# Patient Record
Sex: MALE | Race: AMERICAN INDIAN OR ALASKA NATIVE | NOT HISPANIC OR LATINO | Employment: UNEMPLOYED | URBAN - NONMETROPOLITAN AREA
[De-identification: names, ages, dates, MRNs, and addresses within clinical notes are randomized per-mention and may not be internally consistent; named-entity substitution may affect disease eponyms.]

---

## 2017-10-23 ENCOUNTER — NON-PROVIDER VISIT (OUTPATIENT)
Dept: URGENT CARE | Facility: PHYSICIAN GROUP | Age: 57
End: 2017-10-23

## 2017-10-23 DIAGNOSIS — Z02.1 PRE-EMPLOYMENT DRUG SCREENING: ICD-10-CM

## 2017-10-23 LAB
AMP AMPHETAMINE: ABNORMAL
COC COCAINE: ABNORMAL
INT CON NEG: NEGATIVE
INT CON POS: POSITIVE
MET METHAMPHETAMINES: ABNORMAL
OPI OPIATES: ABNORMAL
PCP PHENCYCLIDINE: ABNORMAL
POC DRUG COMMENT 753798-OCCUPATIONAL HEALTH: POSITIVE
THC: POSITIVE

## 2017-10-23 PROCEDURE — 80305 DRUG TEST PRSMV DIR OPT OBS: CPT | Performed by: PHYSICIAN ASSISTANT

## 2020-03-27 ENCOUNTER — TELEPHONE (OUTPATIENT)
Dept: CARDIOLOGY | Facility: MEDICAL CENTER | Age: 60
End: 2020-03-27

## 2020-03-27 NOTE — TELEPHONE ENCOUNTER
Called and left message with the [facility] clinic pt has an EF of 25 with possible apical thrombus on echo today in [place]    It is my pleasure to participate in the care of Mr. Horta.  Please do not hesitate to contact me with questions or concerns.    Anmol June MD PhD [facility]  Cardiologist [facility]    Please note that this dictation was created using voice recognition software. There may be errors I did not discover before finalizing the note.

## 2020-03-27 NOTE — TELEPHONE ENCOUNTER
CW    Dr. Anmol Mccray MD called back. Please have CW call Dr Mccray back on their cell phone # [phone].    Thank you,  Audrey LUKE

## 2020-03-27 NOTE — TELEPHONE ENCOUNTER
Called Dr Mccray pt has resistant HTN so will increase his meds add CHF meds and get him in with urgent cardiology eval for ischemic eval    Would be imprtant to rule out substance abuse as well    also empiric anticoagulation until he can get a contrast echo

## 2020-08-25 ENCOUNTER — OFFICE VISIT (OUTPATIENT)
Dept: CARDIOLOGY | Facility: MEDICAL CENTER | Age: 60
End: 2020-08-25
Payer: MEDICAID

## 2020-08-25 VITALS
DIASTOLIC BLOOD PRESSURE: 82 MMHG | SYSTOLIC BLOOD PRESSURE: 148 MMHG | OXYGEN SATURATION: 96 % | HEIGHT: 71 IN | BODY MASS INDEX: 28.14 KG/M2 | HEART RATE: 66 BPM | WEIGHT: 201 LBS

## 2020-08-25 DIAGNOSIS — I10 ESSENTIAL HYPERTENSION: ICD-10-CM

## 2020-08-25 LAB — EKG IMPRESSION: NORMAL

## 2020-08-25 PROCEDURE — 93000 ELECTROCARDIOGRAM COMPLETE: CPT | Performed by: INTERNAL MEDICINE

## 2020-08-25 PROCEDURE — 99204 OFFICE O/P NEW MOD 45 MIN: CPT | Performed by: INTERNAL MEDICINE

## 2020-08-25 NOTE — NON-PROVIDER
Patient unable to verify Medication list. I will attempt to contact [facility] pharmacy for confirmation. Educated patient on the importance of have a list or picture of bottle for his appointments. Patient agrees.    Attempted to get Medication list from [facility]. [facility] states that it is a Hipaa Violation to provide the information on a patient that is currently on our clinic being seen.    Will attempt to contact PCP next.  [facility] office closed open 9-3. Will have patient report back when he gets home with List of medications(Call our office [phone]) to update his chart.

## 2020-08-25 NOTE — PROGRESS NOTES
Arrhythmia/Cardiac Clinic Note (New patient)     DOS: 8/25/2020    Referring physician: Self-referred    Chief complaint/Reason for consult: High BP    HPI: 60-year-old man with history of hypertension, referred by himself for elevation of blood pressure.  He was told he should see a cardiologist when he had an abnormal echocardiogram in the spring.  He has medication noncompliance.  He did not bring his medication list in today.  He says he takes a medication for cholesterol, some medication for blood pressure, he thinks he might be on a blood thinner but does not know the name of it.  He denies shortness of breath on exertion and denies chest pain on exertion.  He does have a little bit of lower extremity edema.  No palpitations, no syncope, no presyncope.  Echocardiogram in March 2020 did show ejection fraction 25%, with possible LV thrombus    ROS (+ highlighted in bold):  Constitutional: Fevers/chills/fatigue/weightloss  HEENT: Blurry vision/eye pain/sore throat/hearing loss  Respiratory: Shortness of breath/cough  Cardiovascular: Chest pain/palpitations/edema/orthopnea/syncope  GI: Nausea/vomitting/diarrhea  MSK: Arthralgias/myagias/muscle weakness  Skin: Rash/sores  Neurological: Numbness/tremors/vertigo  Endocrine: Excessive thirst/polyuria/cold intolerance/heat intolerance  Psych: Depression/anxiety    No past medical history on file.   Hypertension  Hyperlipidemia  Heart failure, systolic, chronic    No past surgical history on file.    Social History     Socioeconomic History   • Marital status: Single     Spouse name: Not on file   • Number of children: Not on file   • Years of education: Not on file   • Highest education level: Not on file   Occupational History   • Not on file   Social Needs   • Financial resource strain: Not on file   • Food insecurity     Worry: Not on file     Inability: Not on file   • Transportation needs     Medical: Not on file     Non-medical: Not on file   Tobacco Use   •  "Smoking status: Not on file   Substance and Sexual Activity   • Alcohol use: Not on file   • Drug use: Not on file   • Sexual activity: Not on file   Lifestyle   • Physical activity     Days per week: Not on file     Minutes per session: Not on file   • Stress: Not on file   Relationships   • Social connections     Talks on phone: Not on file     Gets together: Not on file     Attends Hinduism service: Not on file     Active member of club or organization: Not on file     Attends meetings of clubs or organizations: Not on file     Relationship status: Not on file   • Intimate partner violence     Fear of current or ex partner: Not on file     Emotionally abused: Not on file     Physically abused: Not on file     Forced sexual activity: Not on file   Other Topics Concern   • Not on file   Social History Narrative   • Not on file       No family history on file.   No family history of premature coronary disease    No Known Allergies    No current outpatient medications on file.     No current facility-administered medications for this visit.    Medication list is unknown    Physical Exam:  Vitals:    08/25/20 1547   BP: 148/82   BP Location: Left arm   Patient Position: Sitting   BP Cuff Size: Adult   Pulse: 66   SpO2: 96%   Weight: 91.2 kg (201 lb)   Height: 1.791 m (5' 10.5\")     General appearance: NAD, conversant   Eyes: anicteric sclerae, moist conjunctivae; no lid-lag; PERRLA  HENT: Atraumatic; oropharynx clear with moist mucous membranes and no mucosal ulcerations; normal hard and soft palate  Neck: Trachea midline; FROM, supple, no thyromegaly or lymphadenopathy  Lungs: CTA, with normal respiratory effort and no intercostal retractions  CV: RRR, no MRGs, no JVD   Abdomen: Soft, non-tender; no masses or HSM  Extremities: No peripheral edema or extremity lymphadenopathy  Skin: Normal temperature, turgor and texture; no rash, ulcers or subcutaneous nodules  Psych: Appropriate affect, alert and oriented to " person, place and time    Data:  Prior echo/stress results reviewed: Ejection fraction 25%, possible LV thrombus    EKG interpreted by me: Sinus rhythm, IVCD    Impression/Plan:  1. Essential hypertension  EKG     1.  Hypertension  2.  Hyperlipidemia  3.  Chronic systolic heart failure  4.  Possible LV thrombus    -He does not know his medication so at this time it is difficult to tell what he is on and how to treat further  -His blood pressure is at goal but I do not know what he is taking so I can adjust his medication accordingly  -He said he does take a statin medication  -He denies having any sort of heart cath work-up.  -If he is not on anticoagulation, I would prefer to send him for a coronary angiogram given depressed LV function  -If he is on anticoagulation, coronary CT angiogram may be sufficient  -He should probably get a repeat echocardiogram with contrast as well    Overall, there is a lot we do not know about his care and the medications that he is taking, I will set him up to see one of our incoming general cardiologists to establish care and work out with medications he is on and further work-up of systolic heart failure  .    Jesus Lackey MD  Cardiac Electrophysiology

## 2020-09-17 ENCOUNTER — OFFICE VISIT (OUTPATIENT)
Dept: CARDIOLOGY | Facility: MEDICAL CENTER | Age: 60
End: 2020-09-17
Payer: MEDICAID

## 2020-09-17 VITALS
DIASTOLIC BLOOD PRESSURE: 88 MMHG | SYSTOLIC BLOOD PRESSURE: 152 MMHG | BODY MASS INDEX: 27.09 KG/M2 | RESPIRATION RATE: 12 BRPM | WEIGHT: 193.5 LBS | HEIGHT: 71 IN | OXYGEN SATURATION: 98 % | HEART RATE: 60 BPM

## 2020-09-17 DIAGNOSIS — E11.9 TYPE 2 DIABETES MELLITUS WITHOUT COMPLICATION, WITHOUT LONG-TERM CURRENT USE OF INSULIN (HCC): ICD-10-CM

## 2020-09-17 DIAGNOSIS — I24.0 LV (LEFT VENTRICULAR) MURAL THROMBUS WITHOUT MI (HCC): ICD-10-CM

## 2020-09-17 DIAGNOSIS — I1A.0 RESISTANT HYPERTENSION: ICD-10-CM

## 2020-09-17 DIAGNOSIS — I10 ESSENTIAL HYPERTENSION, BENIGN: ICD-10-CM

## 2020-09-17 DIAGNOSIS — I50.22 CHRONIC SYSTOLIC HEART FAILURE (HCC): ICD-10-CM

## 2020-09-17 DIAGNOSIS — Z79.01 ANTICOAGULANT LONG-TERM USE: ICD-10-CM

## 2020-09-17 PROCEDURE — 99215 OFFICE O/P EST HI 40 MIN: CPT | Performed by: INTERNAL MEDICINE

## 2020-09-17 RX ORDER — SPIRONOLACTONE 25 MG/1
25 TABLET ORAL DAILY
COMMUNITY
End: 2021-04-15

## 2020-09-17 RX ORDER — CARVEDILOL 6.25 MG/1
6.25 TABLET ORAL 2 TIMES DAILY WITH MEALS
COMMUNITY
End: 2021-04-15

## 2020-09-17 RX ORDER — WARFARIN SODIUM 5 MG/1
5 TABLET ORAL DAILY
COMMUNITY
End: 2021-04-15

## 2020-09-17 RX ORDER — LOSARTAN POTASSIUM AND HYDROCHLOROTHIAZIDE 25; 100 MG/1; MG/1
1 TABLET ORAL DAILY
COMMUNITY
End: 2021-04-15

## 2020-09-17 RX ORDER — ATORVASTATIN CALCIUM 20 MG/1
20 TABLET, FILM COATED ORAL NIGHTLY
COMMUNITY
End: 2021-04-15

## 2020-09-17 RX ORDER — AMLODIPINE BESYLATE 10 MG/1
10 TABLET ORAL DAILY
Qty: 90 TAB | Refills: 3 | Status: SHIPPED | OUTPATIENT
Start: 2020-09-17 | End: 2021-04-15

## 2020-09-17 SDOH — HEALTH STABILITY: MENTAL HEALTH: HOW OFTEN DO YOU HAVE 6 OR MORE DRINKS ON ONE OCCASION?: WEEKLY

## 2020-09-17 ASSESSMENT — ENCOUNTER SYMPTOMS
NEAR-SYNCOPE: 0
WEAKNESS: 0
SLEEP DISTURBANCES DUE TO BREATHING: 0
FEVER: 0
DIARRHEA: 0
WHEEZING: 0
VOMITING: 0
SYNCOPE: 0
DIZZINESS: 0
BLURRED VISION: 0
LIGHT-HEADEDNESS: 0
NIGHT SWEATS: 0
COUGH: 0
CHILLS: 0
IRREGULAR HEARTBEAT: 0
CLAUDICATION: 0
DIAPHORESIS: 0
SORE THROAT: 0
DYSPNEA ON EXERTION: 0
DECREASED APPETITE: 0
SHORTNESS OF BREATH: 0
ODYNOPHAGIA: 0
BLOATING: 0
ABDOMINAL PAIN: 0
NAUSEA: 0
HEADACHES: 0

## 2020-09-17 NOTE — PROGRESS NOTES
Cardiology Follow-up Consultation Note    Date of note:    9/17/2020    Primary Care Provider: Dr. Mccray    Name:             Donte Horta     YOB: 1960  MRN:               [id number]    CC: Hypertension    HISTORY OF PRESENT ILLNESS  Mr. Donte Horta is a 60 y.o. male who returns to see us for follow-up of chronic systolic heart failure with estimated EF 25-30%, resistant hypertension, diabetes mellitus and LV thrombus on chronic anticoagulation medication.    Last clinic visit: 8/25/2020 with Jesus Lackey M.D.    Interim History:  Patient states that he is feeling better with more energy than prior.  Reports medication compliance for the most part however thinks that he is being overmedicated.  States that he will sometimes cut his medications in half if he feels good.  Currently he is taking warfarin for LV thrombus but is unsure if he is getting his INR checked.  He has a blood pressure cuff at home and reports average blood pressure 150s/80s.  Denies shortness of breath, orthopnea, PND, dyspnea on exertion, abdominal bloating or lower extremity edema.  Denies previous history or current episodes of chest pain or chest pressure.  Is unaware of suffering a previous heart attack.  Had appetite loss earlier this year while he was sick and lost 30 to 40 pounds but it has resolved since then.    Reports doing occasional marijuana with no history of methamphetamines or cocaine use.    Review of Systems   Constitution: Negative for chills, decreased appetite, diaphoresis, fever and night sweats.   HENT: Negative for congestion, odynophagia and sore throat.    Eyes: Negative for blurred vision.   Cardiovascular: Negative for chest pain, claudication, dyspnea on exertion, irregular heartbeat, near-syncope and syncope.   Respiratory: Negative for cough, shortness of breath, sleep disturbances due to breathing and wheezing.    Gastrointestinal: Negative for bloating, abdominal pain, diarrhea,  nausea and vomiting.   Neurological: Negative for dizziness, headaches, light-headedness and weakness.       Past Medical History:   Diagnosis Date   • Anticoagulation monitoring, special range    • Diabetes (HCC)    • Hypertension          History reviewed. No pertinent surgical history.      Current Outpatient Medications   Medication Sig Dispense Refill   • carvedilol (COREG) 6.25 MG Tab Take 6.25 mg by mouth 2 times a day, with meals.     • atorvastatin (LIPITOR) 20 MG Tab Take 40 mg by mouth every evening.     • spironolactone (ALDACTONE) 25 MG Tab Take 25 mg by mouth every day.     • metFORMIN (GLUCOPHAGE) 500 MG Tab Take 1,000 mg by mouth 2 times a day, with meals.     • losartan-hydrochlorothiazide (HYZAAR) 100-25 MG per tablet Take 1 Tab by mouth every day.     • warfarin (COUMADIN) 5 MG Tab Take 5 mg by mouth every day.     • amLODIPine (NORVASC) 10 MG Tab Take 1 Tab by mouth every day. 90 Tab 3     No current facility-administered medications for this visit.          No Known Allergies      History reviewed. No pertinent family history.      Social History     Socioeconomic History   • Marital status: Single     Spouse name: Not on file   • Number of children: Not on file   • Years of education: Not on file   • Highest education level: Not on file   Occupational History   • Not on file   Social Needs   • Financial resource strain: Not on file   • Food insecurity     Worry: Not on file     Inability: Not on file   • Transportation needs     Medical: Not on file     Non-medical: Not on file   Tobacco Use   • Smoking status: Former Smoker   • Smokeless tobacco: Never Used   Substance and Sexual Activity   • Alcohol use: Yes     Binge frequency: Weekly   • Drug use: Yes     Types: Marijuana   • Sexual activity: Not on file   Lifestyle   • Physical activity     Days per week: Not on file     Minutes per session: Not on file   • Stress: Not on file   Relationships   • Social connections     Talks on phone: Not  "on file     Gets together: Not on file     Attends Yazidism service: Not on file     Active member of club or organization: Not on file     Attends meetings of clubs or organizations: Not on file     Relationship status: Not on file   • Intimate partner violence     Fear of current or ex partner: Not on file     Emotionally abused: Not on file     Physically abused: Not on file     Forced sexual activity: Not on file   Other Topics Concern   • Not on file   Social History Narrative   • Not on file         Physical Exam:  Ambulatory Vitals  /88 (BP Location: Left arm, Patient Position: Sitting, BP Cuff Size: Adult)   Pulse 60   Resp 12   Ht 1.791 m (5' 10.5\")   Wt 87.8 kg (193 lb 8 oz)   SpO2 98%    Oxygen Therapy:  Pulse Oximetry: 98 %  BP Readings from Last 4 Encounters:   09/17/20 152/88   08/25/20 148/82       Weight/BMI: Body mass index is 27.37 kg/m².  Wt Readings from Last 4 Encounters:   09/17/20 87.8 kg (193 lb 8 oz)   08/25/20 91.2 kg (201 lb)       GEN: Well developed, well nourished and in no acute distress.  HEART: no significant JVD, regular rate and rhythm, normal S1 and S2, no murmurs, no third heart sounds, normal cardiac palpation  LUNG: clear to auscultation bilaterally, no wheezing, no crackles, normal respiratory effort on room air  ABDOMEN: soft, non-tender, non-distended, normal bowel sounds throughout  EXTREMITIES: no peripheral edema noted  VASCULAR: no significantly elevated jugular venous pressure, no carotid bruits noted, radial pulses 2+ and equal      Lab Data Review:  No labs available for review    Cardiac Imaging and Procedures Review:    No cardiac imaging available to review      Assessment & Plan     1. Essential hypertension, benign     2. Resistant hypertension     3. Type 2 diabetes mellitus without complication, without long-term current use of insulin (HCC)     4. LV (left ventricular) mural thrombus without MI (HCC)     5. Chronic systolic heart failure (HCC)     "   6. Anticoagulant long-term use         There is still a concern for medication compliance.  Blood pressure is elevated in the clinic today and reports elevated blood pressures at home.  He was previously on amlodipine 10 mg but has not received a refill for few months.  Patient was given a prescription for amlodipine 10 mg daily.  Continue to check blood pressure at home with goal average blood pressure close to 130/80.      PT/INR check with primary care physician recommended since he is on warfarin.    Repeat transthoracic echocardiogram which has been previously ordered to reevaluate LV systolic function and LV thrombus.  If LV function continues to be depressed, we would need to proceed with ischemic evaluation with left heart catheterization.    Importance of medication compliance, dietary changes with low-salt/low-fat diet, cardio-focused exercise routine has been discussed in detail with the patient who initially was a bit hesitant but voices understanding and is in agreement with.  Patient does not seem to have full insight into his current medical condition.  Large amount of today's visit was focused on explaining the pathophysiology, natural history and long term prognosis of the disease process.    It was a pleasure seeing Mr. Donte Horta in my clinic today. Return in about 3 months (around 12/17/2020).. Patient is aware to call the cardiology clinic with any questions or concerns.      Chandu Cruz MD  [facility]  [facility]  [address] [place] [address]  [place] [address]  Phone: [phone]  Fax: [phone]

## 2021-01-07 ENCOUNTER — HOSPITAL ENCOUNTER (OUTPATIENT)
Dept: RADIOLOGY | Facility: MEDICAL CENTER | Age: 61
End: 2021-01-07
Attending: FAMILY MEDICINE
Payer: MEDICAID

## 2021-01-07 ENCOUNTER — HOSPITAL ENCOUNTER (OUTPATIENT)
Dept: CARDIOLOGY | Facility: MEDICAL CENTER | Age: 61
End: 2021-01-07
Attending: FAMILY MEDICINE
Payer: MEDICAID

## 2021-01-07 DIAGNOSIS — I82.90 THROMBOSIS: ICD-10-CM

## 2021-01-07 DIAGNOSIS — R03.0 ELEVATED BLOOD PRESSURE READING WITHOUT DIAGNOSIS OF HYPERTENSION: ICD-10-CM

## 2021-01-07 LAB
LV EJECT FRACT  99904: 40
LV EJECT FRACT MOD 2C 99903: 27.28
LV EJECT FRACT MOD 4C 99902: 49.36
LV EJECT FRACT MOD BP 99901: 40.68

## 2021-01-07 PROCEDURE — 93975 VASCULAR STUDY: CPT

## 2021-01-07 PROCEDURE — 93306 TTE W/DOPPLER COMPLETE: CPT

## 2021-01-07 PROCEDURE — 700117 HCHG RX CONTRAST REV CODE 255: Performed by: FAMILY MEDICINE

## 2021-01-07 PROCEDURE — 93975 VASCULAR STUDY: CPT | Mod: 26 | Performed by: INTERNAL MEDICINE

## 2021-01-07 PROCEDURE — 93306 TTE W/DOPPLER COMPLETE: CPT | Mod: 26 | Performed by: INTERNAL MEDICINE

## 2021-01-07 RX ADMIN — HUMAN ALBUMIN MICROSPHERES AND PERFLUTREN 3 ML: 10; .22 INJECTION, SOLUTION INTRAVENOUS at 10:12

## 2021-04-09 ENCOUNTER — TELEPHONE (OUTPATIENT)
Dept: CARDIOLOGY | Facility: MEDICAL CENTER | Age: 61
End: 2021-04-09

## 2021-04-09 NOTE — TELEPHONE ENCOUNTER
Called pt. He is ok with driving to [employer] twice, so we left the 5/5 DA appt and added an appt with RT on 4/15. Screening questions negative.   Requested lab results from [facility].   Faxed request to [facility] at [phone] for most recent office notes.

## 2021-04-09 NOTE — TELEPHONE ENCOUNTER
Received call from pt's PCP, Dr. Anmol Mccray at the [facility]. He has been trying to manage pt's resistant htn. Pt's recent labs show decline in kidney function, now CKD stage 4. He is making medication adjustments and made a STAT referral to nephrology, but he is wondering if pt can see Dr. Cruz sooner. Explained DA on maternity leave but we can get in with TIFFANIE. He says pt is willing to drive to [place], but may only be willing to make the drive once. Dr. Mccray thinks pt's complicated situation warrants an MD visit in the near future. We decided that in order not to delay care, the soonest TIFFANIE appt should be made in [place], followed by an MD appt in [place] instead of the currently scheduled 5/5 DA appt in [place].    Attempted to call pt at [phone], but VM not set up. Will try again later.

## 2021-04-12 NOTE — PROGRESS NOTES
Chief Complaint   Patient presents with   • HTN (Controlled)     follow up       Subjective:   Donte Horta is a 60 y.o. male who presents today for follow up systolic heart failure LVEF 40%.     He is followed by Dr. Cruz in our clinic, history of chronic systolic heart failure LVEF 25-30%, hypertension, diabetes, and LV thrombus noted march 2020 repeat ECHO in 1/2021 showed no thrombus was seen and taken off warfarin.     Last seen 9/2020 with Dr. Cruz. ECHO showed ef improved from 25% to 40% basal to mid inferior wall hypokinesis with scar. No thrombus observed.     Patient was seen by PCP concern for CKD stage 4 creatinine 4. 33 and BUN 78. K 6.2. patient was continuing to take aldactone.     Patient stated he feels great, denies any chest pain, sob, dizziness or palpitations.     Past Medical History:   Diagnosis Date   • Anticoagulation monitoring, special range    • Cardiomyopathy (HCC)    • Diabetes (HCC)    • Hypertension    • LV (left ventricular) mural thrombus     on warfarin      History reviewed. No pertinent surgical history.  History reviewed. No pertinent family history.  Social History     Socioeconomic History   • Marital status: Single     Spouse name: Not on file   • Number of children: Not on file   • Years of education: Not on file   • Highest education level: Not on file   Occupational History   • Not on file   Tobacco Use   • Smoking status: Former Smoker   • Smokeless tobacco: Never Used   Substance and Sexual Activity   • Alcohol use: Yes   • Drug use: Yes     Types: Marijuana   • Sexual activity: Not on file   Other Topics Concern   • Not on file   Social History Narrative   • Not on file     Social Determinants of Health     Financial Resource Strain:    • Difficulty of Paying Living Expenses:    Food Insecurity:    • Worried About Running Out of Food in the Last Year:    • Ran Out of Food in the Last Year:    Transportation Needs:    • Lack of Transportation (Medical):    • Lack of  Transportation (Non-Medical):    Physical Activity:    • Days of Exercise per Week:    • Minutes of Exercise per Session:    Stress:    • Feeling of Stress :    Social Connections:    • Frequency of Communication with Friends and Family:    • Frequency of Social Gatherings with Friends and Family:    • Attends [employer] Services:    • Active Member of Clubs or Organizations:    • Attends Club or Organization Meetings:    • Marital Status:    Intimate Partner Violence:    • Fear of Current or Ex-Partner:    • Emotionally Abused:    • Physically Abused:    • Sexually Abused:      No Known Allergies  Outpatient Encounter Medications as of 4/15/2021   Medication Sig Dispense Refill   • atorvastatin (LIPITOR) 40 MG Tab Take 40 mg by mouth every evening.     • losartan (COZAAR) 100 MG Tab Take 1 tablet by mouth every day. 90 tablet 3   • carvedilol (COREG) 25 MG Tab Take 1 tablet by mouth 2 times a day with meals. 90 tablet 3   • metFORMIN (GLUCOPHAGE) 500 MG Tab Take 1,000 mg by mouth 2 times a day, with meals.     • [DISCONTINUED] furosemide (LASIX) 40 MG Tab Take 40 mg by mouth every day.     • [DISCONTINUED] DILTIAZem CD (DILTIAZEM CD) 180 MG CAPSULE SR 24 HR Take 180 mg by mouth every day.     • [DISCONTINUED] carvedilol (COREG) 6.25 MG Tab Take 6.25 mg by mouth 2 times a day, with meals.     • [DISCONTINUED] atorvastatin (LIPITOR) 20 MG Tab Take 20 mg by mouth every evening.     • [DISCONTINUED] spironolactone (ALDACTONE) 25 MG Tab Take 25 mg by mouth every day.     • [DISCONTINUED] losartan-hydrochlorothiazide (HYZAAR) 100-25 MG per tablet Take 1 Tab by mouth every day.     • [DISCONTINUED] warfarin (COUMADIN) 5 MG Tab Take 5 mg by mouth every day.     • [DISCONTINUED] amLODIPine (NORVASC) 10 MG Tab Take 1 Tab by mouth every day. (Patient not taking: Reported on 4/15/2021) 90 Tab 3     No facility-administered encounter medications on file as of 4/15/2021.     Review of Systems   Constitutional: Negative for  "malaise/fatigue.   Eyes: Negative for blurred vision and double vision.   Respiratory: Negative for cough, shortness of breath and wheezing.    Cardiovascular: Negative for chest pain, palpitations, orthopnea and leg swelling.   Musculoskeletal: Negative for falls.   Neurological: Negative for dizziness, focal weakness, loss of consciousness, weakness and headaches.   All other systems reviewed and are negative.       Objective:   /76 (BP Location: Left arm, Patient Position: Sitting)   Pulse (!) 52   Ht 1.791 m (5' 10.5\")   Wt 93 kg (205 lb)   SpO2 100%   BMI 29.00 kg/m²     Physical Exam   Constitutional: He is oriented to person, place, and time. He appears well-developed and well-nourished. No distress.   HENT:   Head: Normocephalic and atraumatic.   Eyes: Pupils are equal, round, and reactive to light.   Neck: No JVD present.   Cardiovascular: Normal rate, regular rhythm and normal heart sounds.   Pulmonary/Chest: Effort normal and breath sounds normal.   Abdominal: Soft. Bowel sounds are normal. He exhibits no distension.   Musculoskeletal:         General: No edema.   Neurological: He is alert and oriented to person, place, and time.   Skin: Skin is warm and dry. He is not diaphoretic.   Psychiatric: He has a normal mood and affect. His behavior is normal. Judgment and thought content normal.       Echocardiography Laboratory  1/7/2021  Contrast study.  Left ventricle is mildly dilated, 5.9 cm.  Mild to moderately reduced left ventricular systolic function.  Left ventricular ejection fraction is visually estimated to be 40-45%.  Basal to mid inferior wall hypo to akinesis with scar.  Grade II diastolic dysfunction. Severely dilated left atrium.  No evidence of right to left shunt by agitated saline challenge including Valsalva.  Unable to estimate pulmonary artery pressure, normal estimated right atrial pressure.  Thrombus is not observed in the left ventricular apex.    ECHO   3/2020  Showed LVEF " 25%  Severe hypokinesis with better preservation of the anterior and anterolateral walls.  There is grade 3 diastolic dysfunction is decreased  RVSP is moderately elevated 50mmHg  Moderate MR  Moderate TR  Mild AV regurgitation        Renal Artery Duplex  1/7/2021   Velocities and waveforms are normal through the bilateral renal arteries.   No evidence of abdominal aortic aneurysm.     Assessment:     1. Chronic systolic heart failure (HCC)  Basic Metabolic Panel   2. LV (left ventricular) mural thrombus without MI (HCC)  Basic Metabolic Panel   3. Essential hypertension, benign     4. Stage 4 chronic kidney disease (HCC)     5. Resistant hypertension         Medical Decision Making:  Today's Assessment / Status / Plan:     1. Chronic systolic heart failure ef improved 25% to 40%:  - stage C, class 2  - euvolemic upon examination.Today, based on physical examination findings, patient is euvolemic. No JVD, lungs are clear to auscultation, no pitting edema in bilateral lower extremities, no ascites.  - stop the aldactone, furosemide, hctz due to CKD stage 4   - continue losartan 100mg qd   - ischemic evaluation on hold due to CKD with creatinine 4.33    2. Acute on chronic CKD;  - nephrology consult per PCP  - stop the medications as noted above    3. Hyperkalemia:  - stop aldactone   - repeat BMP in 1 week     4. LV thrombus:  - recent ECHO showed no thrombus and taken off warfarin     5. Hypertension:  - elevated   - increased coreg 25mg BID   - stop the diltiazem  - continue losartan 100mg qd     Follow up in 2 weeks, ER precaution

## 2021-04-15 ENCOUNTER — OFFICE VISIT (OUTPATIENT)
Dept: CARDIOLOGY | Facility: MEDICAL CENTER | Age: 61
End: 2021-04-15
Payer: MEDICAID

## 2021-04-15 VITALS
HEART RATE: 52 BPM | OXYGEN SATURATION: 100 % | HEIGHT: 71 IN | WEIGHT: 205 LBS | DIASTOLIC BLOOD PRESSURE: 76 MMHG | SYSTOLIC BLOOD PRESSURE: 158 MMHG | BODY MASS INDEX: 28.7 KG/M2

## 2021-04-15 DIAGNOSIS — I1A.0 RESISTANT HYPERTENSION: ICD-10-CM

## 2021-04-15 DIAGNOSIS — I24.0 LV (LEFT VENTRICULAR) MURAL THROMBUS WITHOUT MI (HCC): ICD-10-CM

## 2021-04-15 DIAGNOSIS — N18.4 STAGE 4 CHRONIC KIDNEY DISEASE (HCC): ICD-10-CM

## 2021-04-15 DIAGNOSIS — I50.22 CHRONIC SYSTOLIC HEART FAILURE (HCC): ICD-10-CM

## 2021-04-15 DIAGNOSIS — I10 ESSENTIAL HYPERTENSION, BENIGN: ICD-10-CM

## 2021-04-15 PROCEDURE — 99214 OFFICE O/P EST MOD 30 MIN: CPT | Performed by: NURSE PRACTITIONER

## 2021-04-15 RX ORDER — FUROSEMIDE 40 MG/1
40 TABLET ORAL DAILY
COMMUNITY
End: 2021-04-15

## 2021-04-15 RX ORDER — ATORVASTATIN CALCIUM 40 MG/1
40 TABLET, FILM COATED ORAL NIGHTLY
COMMUNITY

## 2021-04-15 RX ORDER — DILTIAZEM HYDROCHLORIDE 180 MG/1
180 CAPSULE, COATED, EXTENDED RELEASE ORAL DAILY
COMMUNITY
End: 2021-04-15

## 2021-04-15 RX ORDER — LOSARTAN POTASSIUM 100 MG/1
100 TABLET ORAL DAILY
Qty: 90 TABLET | Refills: 3 | Status: SHIPPED | OUTPATIENT
Start: 2021-04-15 | End: 2021-06-09

## 2021-04-15 RX ORDER — CARVEDILOL 25 MG/1
25 TABLET ORAL 2 TIMES DAILY WITH MEALS
Qty: 90 TABLET | Refills: 3 | Status: SHIPPED | OUTPATIENT
Start: 2021-04-15

## 2021-04-15 ASSESSMENT — ENCOUNTER SYMPTOMS
FOCAL WEAKNESS: 0
PALPITATIONS: 0
ORTHOPNEA: 0
FALLS: 0
LOSS OF CONSCIOUSNESS: 0
HEADACHES: 0
BLURRED VISION: 0
COUGH: 0
DIZZINESS: 0
WHEEZING: 0
WEAKNESS: 0
DOUBLE VISION: 0
SHORTNESS OF BREATH: 0

## 2021-04-15 NOTE — PATIENT INSTRUCTIONS
- stop sprinolactone 25mg   - stop losartan - hctz 100-12.5mg, start losartan 100mg qd   - stop diltazem 180mg once a day   - increased carvedilol 25mg twice a day   - stop furosemide 40mg once day

## 2021-04-16 ENCOUNTER — TELEPHONE (OUTPATIENT)
Dept: CARDIOLOGY | Facility: MEDICAL CENTER | Age: 61
End: 2021-04-16

## 2021-04-16 NOTE — TELEPHONE ENCOUNTER
RT    Pt called and is requesting Office notes and current medication list after modifications have been made from yesterdays appt 4/15 with RT. Pt would like us to fax over this information to the [facility] in [place] to Dr. Mccray. Please call pt back If you have any further questions at [phone]    [facility] Fax: [phone]     Thank you

## 2021-04-27 ENCOUNTER — TELEPHONE (OUTPATIENT)
Dept: CARDIOLOGY | Facility: MEDICAL CENTER | Age: 61
End: 2021-04-27

## 2021-04-27 NOTE — TELEPHONE ENCOUNTER
Called patient to remind him to complete nonfasting blood work prior to upcoming appointment with DA on 5/5/21.     He states he will be completing blood work on 5/2/21 at the [facility].     Will obtain results once available.     Confirmed appointment date and time.

## 2021-05-06 ENCOUNTER — OFFICE VISIT (OUTPATIENT)
Dept: CARDIOLOGY | Facility: MEDICAL CENTER | Age: 61
End: 2021-05-06
Payer: MEDICAID

## 2021-05-06 VITALS
HEART RATE: 58 BPM | RESPIRATION RATE: 12 BRPM | BODY MASS INDEX: 28.06 KG/M2 | DIASTOLIC BLOOD PRESSURE: 80 MMHG | OXYGEN SATURATION: 98 % | HEIGHT: 71 IN | SYSTOLIC BLOOD PRESSURE: 142 MMHG | WEIGHT: 200.4 LBS

## 2021-05-06 DIAGNOSIS — N18.4 STAGE 4 CHRONIC KIDNEY DISEASE (HCC): ICD-10-CM

## 2021-05-06 DIAGNOSIS — I24.0 LV (LEFT VENTRICULAR) MURAL THROMBUS WITHOUT MI (HCC): ICD-10-CM

## 2021-05-06 DIAGNOSIS — I50.22 CHRONIC SYSTOLIC HEART FAILURE (HCC): ICD-10-CM

## 2021-05-06 DIAGNOSIS — I1A.0 RESISTANT HYPERTENSION: ICD-10-CM

## 2021-05-06 PROCEDURE — 99214 OFFICE O/P EST MOD 30 MIN: CPT | Performed by: NURSE PRACTITIONER

## 2021-05-06 RX ORDER — FUROSEMIDE 20 MG/1
20 TABLET ORAL DAILY
Qty: 90 TABLET | Refills: 3 | Status: SHIPPED | OUTPATIENT
Start: 2021-05-06 | End: 2021-05-06 | Stop reason: SDUPTHER

## 2021-05-06 RX ORDER — FUROSEMIDE 20 MG/1
20 TABLET ORAL DAILY
Qty: 90 TABLET | Refills: 3 | Status: SHIPPED
Start: 2021-05-06 | End: 2023-11-21

## 2021-05-06 RX ORDER — HYDROCHLOROTHIAZIDE 25 MG/1
25 TABLET ORAL DAILY
Qty: 30 TABLET | Refills: 3 | Status: SHIPPED | OUTPATIENT
Start: 2021-05-06 | End: 2021-05-06

## 2021-05-06 ASSESSMENT — ENCOUNTER SYMPTOMS
BLURRED VISION: 0
DIZZINESS: 0
COUGH: 0
WEAKNESS: 0
DOUBLE VISION: 0
FOCAL WEAKNESS: 0
SHORTNESS OF BREATH: 0
WHEEZING: 0
FALLS: 0
LOSS OF CONSCIOUSNESS: 0
HEADACHES: 0
PALPITATIONS: 0
ORTHOPNEA: 0

## 2021-05-06 NOTE — PROGRESS NOTES
Chief Complaint   Patient presents with   • Hypertension       Subjective:   Donte Horta is a 60 y.o. male who presents today for follow up systolic heart failure LVEF 40%.     He is followed by Dr. Cruz in our clinic, history of chronic systolic heart failure LVEF 25-30%, hypertension, diabetes, and LV thrombus noted march 2020 repeat ECHO in 1/2021 showed no thrombus was seen and taken off warfarin. Stage 4 renal disease.     Last seen 4/15/2021. Taken off diltiazem and aldactone due to CKD. Following with nephrologist. Creatinine 4.33.       Patient stated he feels great, denies any chest pain, sob, dizziness or palpitations. Tolerating medications well. Blood pressure at home ranging sbp 140s.     Past Medical History:   Diagnosis Date   • Anticoagulation monitoring, special range    • Cardiomyopathy (HCC)    • Diabetes (HCC)    • Hypertension    • LV (left ventricular) mural thrombus     on warfarin      History reviewed. No pertinent surgical history.  History reviewed. No pertinent family history.  Social History     Socioeconomic History   • Marital status: Single     Spouse name: Not on file   • Number of children: Not on file   • Years of education: Not on file   • Highest education level: Not on file   Occupational History   • Not on file   Tobacco Use   • Smoking status: Former Smoker   • Smokeless tobacco: Never Used   Substance and Sexual Activity   • Alcohol use: Yes   • Drug use: Yes     Types: Marijuana   • Sexual activity: Not on file   Other Topics Concern   • Not on file   Social History Narrative   • Not on file     Social Determinants of Health     Financial Resource Strain:    • Difficulty of Paying Living Expenses:    Food Insecurity:    • Worried About Running Out of Food in the Last Year:    • Ran Out of Food in the Last Year:    Transportation Needs:    • Lack of Transportation (Medical):    • Lack of Transportation (Non-Medical):    Physical Activity:    • Days of Exercise per Week:   "  • Minutes of Exercise per Session:    Stress:    • Feeling of Stress :    Social Connections:    • Frequency of Communication with Friends and Family:    • Frequency of Social Gatherings with Friends and Family:    • Attends Congregation Services:    • Active Member of Clubs or Organizations:    • Attends Club or Organization Meetings:    • Marital Status:    Intimate Partner Violence:    • Fear of Current or Ex-Partner:    • Emotionally Abused:    • Physically Abused:    • Sexually Abused:      No Known Allergies  Outpatient Encounter Medications as of 5/6/2021   Medication Sig Dispense Refill   • furosemide (LASIX) 20 MG Tab Take 1 tablet by mouth every day. 90 tablet 3   • atorvastatin (LIPITOR) 40 MG Tab Take 40 mg by mouth every evening.     • losartan (COZAAR) 100 MG Tab Take 1 tablet by mouth every day. 90 tablet 3   • carvedilol (COREG) 25 MG Tab Take 1 tablet by mouth 2 times a day with meals. 90 tablet 3   • metFORMIN (GLUCOPHAGE) 500 MG Tab Take 1,000 mg by mouth 2 times a day, with meals.     • [DISCONTINUED] hydroCHLOROthiazide (HYDRODIURIL) 25 MG Tab Take 1 tablet by mouth every day. 30 tablet 3   • [DISCONTINUED] furosemide (LASIX) 20 MG Tab Take 1 tablet by mouth every day. 90 tablet 3     No facility-administered encounter medications on file as of 5/6/2021.     Review of Systems   Constitutional: Negative for malaise/fatigue.   Eyes: Negative for blurred vision and double vision.   Respiratory: Negative for cough, shortness of breath and wheezing.    Cardiovascular: Negative for chest pain, palpitations, orthopnea and leg swelling.   Musculoskeletal: Negative for falls.   Neurological: Negative for dizziness, focal weakness, loss of consciousness, weakness and headaches.   All other systems reviewed and are negative.       Objective:   /80 (BP Location: Left arm, Patient Position: Sitting, BP Cuff Size: Adult)   Pulse (!) 58   Resp 12   Ht 1.791 m (5' 10.5\")   Wt 90.9 kg (200 lb 6.4 oz)   " SpO2 98%   BMI 28.35 kg/m²     Physical Exam   Constitutional: He is oriented to person, place, and time. He appears well-developed and well-nourished. No distress.   HENT:   Head: Normocephalic and atraumatic.   Eyes: Pupils are equal, round, and reactive to light.   Neck: No JVD present.   Cardiovascular: Normal rate, regular rhythm and normal heart sounds.   Pulmonary/Chest: Effort normal and breath sounds normal.   Abdominal: Soft. Bowel sounds are normal. He exhibits no distension.   Musculoskeletal:         General: No edema.   Neurological: He is alert and oriented to person, place, and time.   Skin: Skin is warm and dry. He is not diaphoretic.   Psychiatric: He has a normal mood and affect. His behavior is normal. Judgment and thought content normal.       Echocardiography Laboratory  1/7/2021  Contrast study.  Left ventricle is mildly dilated, 5.9 cm.  Mild to moderately reduced left ventricular systolic function.  Left ventricular ejection fraction is visually estimated to be 40-45%.  Basal to mid inferior wall hypo to akinesis with scar.  Grade II diastolic dysfunction. Severely dilated left atrium.  No evidence of right to left shunt by agitated saline challenge including Valsalva.  Unable to estimate pulmonary artery pressure, normal estimated right atrial pressure.  Thrombus is not observed in the left ventricular apex.    ECHO   3/2020  Showed LVEF 25%  Severe hypokinesis with better preservation of the anterior and anterolateral walls.  There is grade 3 diastolic dysfunction is decreased  RVSP is moderately elevated 50mmHg  Moderate MR  Moderate TR  Mild AV regurgitation        Renal Artery Duplex  1/7/2021   Velocities and waveforms are normal through the bilateral renal arteries.   No evidence of abdominal aortic aneurysm.     Labs from [facility]  4/14/2021  Creatinine 4.33  GFR 16  Sodium 131  Potassium 6.2  Cholesterol 117  LDL 47      Assessment:     1. Chronic systolic heart  failure (HCC)     2. Stage 4 chronic kidney disease (HCC)  Basic Metabolic Panel   3. Resistant hypertension     4. LV (left ventricular) mural thrombus without MI (HCC)         Medical Decision Making:  Today's Assessment / Status / Plan:     1. Chronic systolic heart failure ef improved from 25% to 40%:  - stage C, class 2  - euvolemic upon examination.Today, based on physical examination findings, patient is euvolemic. No JVD, lungs are clear to auscultation, no pitting edema in bilateral lower extremities, no ascites.  - due to acute on chronic CKD, no aldactone   - continue losartan 100mg qd   - ischemic evaluation on hold due to CKD with creatinine 4.33    2. Acute on chronic CKD;  - nephrology consult per PCP    3. Hyperkalemia:  - stop aldactone   - added furosemide 20mg qd     4. LV thrombus:  - recent ECHO showed no thrombus and taken off warfarin     5. Hypertension:  - slight elevation   - continue coreg 25mg BID   - added furosemide as noted above   - continue losartan 100mg qd     Follow up in 1 month with Dr. Cruz, sooner as needed.

## 2021-06-03 ENCOUNTER — TELEPHONE (OUTPATIENT)
Dept: CARDIOLOGY | Facility: MEDICAL CENTER | Age: 61
End: 2021-06-03

## 2021-06-03 ASSESSMENT — ENCOUNTER SYMPTOMS
DIZZINESS: 0
LOSS OF CONSCIOUSNESS: 0
FOCAL WEAKNESS: 0
WEAKNESS: 0
SHORTNESS OF BREATH: 0
WHEEZING: 0
COUGH: 0
HEADACHES: 0
FALLS: 0
PALPITATIONS: 0
BLURRED VISION: 0
DOUBLE VISION: 0
ORTHOPNEA: 0

## 2021-06-03 NOTE — PROGRESS NOTES
Chief Complaint   Patient presents with   • Congestive Heart Failure     F/V Dx: Chronic systolic heart failure (HCC)   • Hypertension     F/V Dx: Resistant hypertension       Subjective:   Donte Horta is a 60 y.o. male who presents today for follow up systolic heart failure LVEF 40%.     He is followed by Dr. Cruz in our clinic, history of chronic systolic heart failure LVEF 25-30%, hypertension, diabetes, and LV thrombus noted march 2020 repeat ECHO in 1/2021 showed no thrombus was seen and taken off warfarin. Stage 4 renal disease (sees nephrologist).     Patient is doing well. He is able to climb two flights of stairs without any sob or chest pain. Tolerating medications well.     Unclear on medications list, nephrology note states coreg 6.25mg BID and on our record coreg 25mg BID. Patient is unclear.     Past Medical History:   Diagnosis Date   • Anticoagulation monitoring, special range    • Cardiomyopathy (HCC)    • Diabetes (HCC)    • Hypertension    • LV (left ventricular) mural thrombus     on warfarin      History reviewed. No pertinent surgical history.  History reviewed. No pertinent family history.  Social History     Socioeconomic History   • Marital status: Single     Spouse name: Not on file   • Number of children: Not on file   • Years of education: Not on file   • Highest education level: Not on file   Occupational History   • Not on file   Tobacco Use   • Smoking status: Former Smoker   • Smokeless tobacco: Never Used   Vaping Use   • Vaping Use: Never used   Substance and Sexual Activity   • Alcohol use: Yes   • Drug use: Yes     Types: Marijuana   • Sexual activity: Not on file   Other Topics Concern   • Not on file   Social History Narrative   • Not on file     Social Determinants of Health     Financial Resource Strain:    • Difficulty of Paying Living Expenses:    Food Insecurity:    • Worried About Running Out of Food in the Last Year:    • Ran Out of Food in the Last Year:     Transportation Needs:    • Lack of Transportation (Medical):    • Lack of Transportation (Non-Medical):    Physical Activity:    • Days of Exercise per Week:    • Minutes of Exercise per Session:    Stress:    • Feeling of Stress :    Social Connections:    • Frequency of Communication with Friends and Family:    • Frequency of Social Gatherings with Friends and Family:    • Attends Gnosticist Services:    • Active Member of Clubs or Organizations:    • Attends Club or Organization Meetings:    • Marital Status:    Intimate Partner Violence:    • Fear of Current or Ex-Partner:    • Emotionally Abused:    • Physically Abused:    • Sexually Abused:      No Known Allergies  Outpatient Encounter Medications as of 6/9/2021   Medication Sig Dispense Refill   • losartan-hydrochlorothiazide (HYZAAR) 100-25 MG per tablet Take 1 tablet by mouth every day. 30 tablet    • furosemide (LASIX) 20 MG Tab Take 1 tablet by mouth every day. 90 tablet 3   • atorvastatin (LIPITOR) 40 MG Tab Take 40 mg by mouth every evening.     • carvedilol (COREG) 25 MG Tab Take 1 tablet by mouth 2 times a day with meals. 90 tablet 3   • [DISCONTINUED] losartan (COZAAR) 100 MG Tab Take 1 tablet by mouth every day. 90 tablet 3   • [DISCONTINUED] metFORMIN (GLUCOPHAGE) 500 MG Tab Take 1,000 mg by mouth 2 times a day, with meals. (Patient not taking: Reported on 6/9/2021)       No facility-administered encounter medications on file as of 6/9/2021.     Review of Systems   Constitutional: Negative for malaise/fatigue.   Eyes: Negative for blurred vision and double vision.   Respiratory: Negative for cough, shortness of breath and wheezing.    Cardiovascular: Negative for chest pain, palpitations, orthopnea and leg swelling.   Musculoskeletal: Negative for falls.   Neurological: Negative for dizziness, focal weakness, loss of consciousness, weakness and headaches.   Psychiatric/Behavioral: The patient is not nervous/anxious.    All other systems reviewed  "and are negative.       Objective:   /80 (BP Location: Left arm, Patient Position: Sitting, BP Cuff Size: Adult)   Pulse (!) 52   Resp 12   Ht 1.791 m (5' 10.5\")   Wt 89.8 kg (198 lb)   SpO2 98%   BMI 28.01 kg/m²     Physical Exam   Constitutional: He is oriented to person, place, and time. He appears well-developed. No distress.   HENT:   Head: Normocephalic and atraumatic.   Eyes: Pupils are equal, round, and reactive to light.   Neck: No JVD present.   Cardiovascular: Normal rate, regular rhythm and normal heart sounds.   Pulmonary/Chest: Effort normal and breath sounds normal.   Abdominal: Soft. Bowel sounds are normal. He exhibits no distension.   Neurological: He is alert and oriented to person, place, and time.   Skin: Skin is warm and dry. He is not diaphoretic.   Psychiatric: His behavior is normal. Judgment and thought content normal.       Echocardiography Laboratory  1/7/2021  Contrast study.  Left ventricle is mildly dilated, 5.9 cm.  Mild to moderately reduced left ventricular systolic function.  Left ventricular ejection fraction is visually estimated to be 40-45%.  Basal to mid inferior wall hypo to akinesis with scar.  Grade II diastolic dysfunction. Severely dilated left atrium.  No evidence of right to left shunt by agitated saline challenge including Valsalva.  Unable to estimate pulmonary artery pressure, normal estimated right atrial pressure.  Thrombus is not observed in the left ventricular apex.    ECHO   3/2020  Showed LVEF 25%  Severe hypokinesis with better preservation of the anterior and anterolateral walls.  There is grade 3 diastolic dysfunction is decreased  RVSP is moderately elevated 50mmHg  Moderate MR  Moderate TR  Mild AV regurgitation        Renal Artery Duplex  1/7/2021   Velocities and waveforms are normal through the bilateral renal arteries.   No evidence of abdominal aortic aneurysm.     Labs from [facility]  4/14/2021  Creatinine 4.33  GFR " 16  Sodium 131  Potassium 6.2  Cholesterol 117  LDL 47      Assessment:     1. Chronic systolic heart failure (HCC)     2. Resistant hypertension     3. LV (left ventricular) mural thrombus without MI (HCC)         Medical Decision Making:  Today's Assessment / Status / Plan:     1. Chronic systolic heart failure ef improved from 25% to 40%:  - stage C, class 2  - euvolemic upon examination.Today, based on physical examination findings, patient is euvolemic. No JVD, lungs are clear to auscultation, no pitting edema in bilateral lower extremities, no ascites.  - due to acute on chronic CKD, no aldactone   - continue losartan- hctz 100-25mg qd    - ischemic evaluation on hold due to CKD with creatinine 4.33    2. Acute on chronic CKD;  - nephrology following   - recommended no aldactone     3. LV thrombus:  - recent ECHO showed no thrombus and taken off warfarin     4. Hypertension:  - slight elevated today   - unclear on dosage of coreg, he will let us known   - continue  furosemide 20mg qd and  Losartan-hctz 100-25mg qd     Follow up in 3 month with Dr. Cruz, sooner as needed.

## 2021-06-03 NOTE — TELEPHONE ENCOUNTER
Chart Prep     Spoke to patient regarding BMP lab order he has due. Patient stated he will try to get that done before upcoming appointment with RT on 6/9/2021.

## 2021-06-09 ENCOUNTER — OFFICE VISIT (OUTPATIENT)
Dept: CARDIOLOGY | Facility: MEDICAL CENTER | Age: 61
End: 2021-06-09
Payer: MEDICAID

## 2021-06-09 VITALS
OXYGEN SATURATION: 98 % | DIASTOLIC BLOOD PRESSURE: 80 MMHG | SYSTOLIC BLOOD PRESSURE: 140 MMHG | HEART RATE: 52 BPM | HEIGHT: 71 IN | WEIGHT: 198 LBS | BODY MASS INDEX: 27.72 KG/M2 | RESPIRATION RATE: 12 BRPM

## 2021-06-09 DIAGNOSIS — I24.0 LV (LEFT VENTRICULAR) MURAL THROMBUS WITHOUT MI (HCC): ICD-10-CM

## 2021-06-09 DIAGNOSIS — I1A.0 RESISTANT HYPERTENSION: ICD-10-CM

## 2021-06-09 DIAGNOSIS — N18.4 STAGE 4 CHRONIC KIDNEY DISEASE (HCC): ICD-10-CM

## 2021-06-09 DIAGNOSIS — I50.22 CHRONIC SYSTOLIC HEART FAILURE (HCC): ICD-10-CM

## 2021-06-09 PROCEDURE — 99214 OFFICE O/P EST MOD 30 MIN: CPT | Performed by: NURSE PRACTITIONER

## 2021-06-09 RX ORDER — LOSARTAN POTASSIUM AND HYDROCHLOROTHIAZIDE 25; 100 MG/1; MG/1
1 TABLET ORAL DAILY
Qty: 30 TABLET | COMMUNITY
Start: 2021-06-09 | End: 2021-08-02

## 2021-06-09 ASSESSMENT — ENCOUNTER SYMPTOMS: NERVOUS/ANXIOUS: 0

## 2021-08-02 ENCOUNTER — PRE-ADMISSION TESTING (OUTPATIENT)
Dept: ADMISSIONS | Facility: MEDICAL CENTER | Age: 61
End: 2021-08-02
Attending: INTERNAL MEDICINE
Payer: MEDICAID

## 2021-08-02 RX ORDER — LOSARTAN POTASSIUM 25 MG/1
25 TABLET ORAL EVERY EVENING
COMMUNITY
End: 2023-11-21

## 2021-08-02 RX ORDER — FERROUS SULFATE 325(65) MG
325 TABLET ORAL DAILY
COMMUNITY
End: 2023-11-21

## 2021-08-03 ENCOUNTER — APPOINTMENT (OUTPATIENT)
Dept: ADMISSIONS | Facility: MEDICAL CENTER | Age: 61
End: 2021-08-03
Attending: INTERNAL MEDICINE
Payer: MEDICAID

## 2021-08-03 DIAGNOSIS — Z01.812 PRE-OPERATIVE LABORATORY EXAMINATION: ICD-10-CM

## 2021-08-03 LAB
ERYTHROCYTE [DISTWIDTH] IN BLOOD BY AUTOMATED COUNT: 42.1 FL (ref 35.9–50)
HCT VFR BLD AUTO: 36.7 % (ref 42–52)
HGB BLD-MCNC: 11.9 G/DL (ref 14–18)
INR PPP: 1.03 (ref 0.87–1.13)
MCH RBC QN AUTO: 29.8 PG (ref 27–33)
MCHC RBC AUTO-ENTMCNC: 32.4 G/DL (ref 33.7–35.3)
MCV RBC AUTO: 91.8 FL (ref 81.4–97.8)
PLATELET # BLD AUTO: 279 K/UL (ref 164–446)
PMV BLD AUTO: 9.8 FL (ref 9–12.9)
PROTHROMBIN TIME: 13.2 SEC (ref 12–14.6)
RBC # BLD AUTO: 4 M/UL (ref 4.7–6.1)
WBC # BLD AUTO: 8.6 K/UL (ref 4.8–10.8)

## 2021-08-03 PROCEDURE — 36415 COLL VENOUS BLD VENIPUNCTURE: CPT

## 2021-08-03 PROCEDURE — 85027 COMPLETE CBC AUTOMATED: CPT

## 2021-08-03 PROCEDURE — 85610 PROTHROMBIN TIME: CPT

## 2021-08-11 ENCOUNTER — APPOINTMENT (OUTPATIENT)
Dept: RADIOLOGY | Facility: MEDICAL CENTER | Age: 61
End: 2021-08-11
Attending: INTERNAL MEDICINE
Payer: MEDICAID

## 2021-08-11 ENCOUNTER — HOSPITAL ENCOUNTER (OUTPATIENT)
Facility: MEDICAL CENTER | Age: 61
End: 2021-08-11
Attending: INTERNAL MEDICINE | Admitting: RADIOLOGY
Payer: MEDICAID

## 2021-08-11 VITALS
HEART RATE: 65 BPM | DIASTOLIC BLOOD PRESSURE: 77 MMHG | SYSTOLIC BLOOD PRESSURE: 157 MMHG | BODY MASS INDEX: 30.01 KG/M2 | TEMPERATURE: 98.1 F | OXYGEN SATURATION: 97 % | RESPIRATION RATE: 15 BRPM | WEIGHT: 198 LBS | HEIGHT: 68 IN

## 2021-08-11 DIAGNOSIS — I12.9 HYPERTENSION, RENAL: ICD-10-CM

## 2021-08-11 DIAGNOSIS — R80.9 PROTEINURIA, UNSPECIFIED TYPE: ICD-10-CM

## 2021-08-11 DIAGNOSIS — E11.9 DIABETES MELLITUS WITHOUT COMPLICATION (HCC): ICD-10-CM

## 2021-08-11 DIAGNOSIS — D63.1 ANEMIA OF CHRONIC RENAL FAILURE, STAGE 3 (MODERATE), UNSPECIFIED WHETHER STAGE 3A OR 3B CKD: ICD-10-CM

## 2021-08-11 DIAGNOSIS — N17.9 ACUTE RENAL FAILURE, UNSPECIFIED ACUTE RENAL FAILURE TYPE (HCC): ICD-10-CM

## 2021-08-11 DIAGNOSIS — R94.4 ABNORMAL RESULTS OF KIDNEY FUNCTION STUDIES: ICD-10-CM

## 2021-08-11 DIAGNOSIS — N18.30 ANEMIA OF CHRONIC RENAL FAILURE, STAGE 3 (MODERATE), UNSPECIFIED WHETHER STAGE 3A OR 3B CKD: ICD-10-CM

## 2021-08-11 LAB — PATHOLOGY CONSULT NOTE: NORMAL

## 2021-08-11 PROCEDURE — 88313 SPECIAL STAINS GROUP 2: CPT

## 2021-08-11 PROCEDURE — 88346 IMFLUOR 1ST 1ANTB STAIN PX: CPT

## 2021-08-11 PROCEDURE — 700111 HCHG RX REV CODE 636 W/ 250 OVERRIDE (IP)

## 2021-08-11 PROCEDURE — 700111 HCHG RX REV CODE 636 W/ 250 OVERRIDE (IP): Performed by: RADIOLOGY

## 2021-08-11 PROCEDURE — 700102 HCHG RX REV CODE 250 W/ 637 OVERRIDE(OP): Performed by: RADIOLOGY

## 2021-08-11 PROCEDURE — 88305 TISSUE EXAM BY PATHOLOGIST: CPT

## 2021-08-11 PROCEDURE — 88348 ELECTRON MICROSCOPY DX: CPT

## 2021-08-11 PROCEDURE — 160002 HCHG RECOVERY MINUTES (STAT)

## 2021-08-11 PROCEDURE — 88350 IMFLUOR EA ADDL 1ANTB STN PX: CPT | Mod: 91

## 2021-08-11 PROCEDURE — 99152 MOD SED SAME PHYS/QHP 5/>YRS: CPT

## 2021-08-11 PROCEDURE — A9270 NON-COVERED ITEM OR SERVICE: HCPCS | Performed by: RADIOLOGY

## 2021-08-11 PROCEDURE — 88300 SURGICAL PATH GROSS: CPT

## 2021-08-11 PROCEDURE — 700105 HCHG RX REV CODE 258: Performed by: RADIOLOGY

## 2021-08-11 RX ORDER — HYDRALAZINE HYDROCHLORIDE 20 MG/ML
10 INJECTION INTRAMUSCULAR; INTRAVENOUS EVERY 6 HOURS PRN
Status: DISCONTINUED | OUTPATIENT
Start: 2021-08-11 | End: 2021-08-11 | Stop reason: HOSPADM

## 2021-08-11 RX ORDER — SODIUM CHLORIDE 9 MG/ML
1000 INJECTION, SOLUTION INTRAVENOUS ONCE
Status: COMPLETED | OUTPATIENT
Start: 2021-08-11 | End: 2021-08-11

## 2021-08-11 RX ORDER — FLUMAZENIL 0.1 MG/ML
INJECTION INTRAVENOUS
Status: COMPLETED
Start: 2021-08-11 | End: 2021-08-11

## 2021-08-11 RX ORDER — SODIUM CHLORIDE 9 MG/ML
500 INJECTION, SOLUTION INTRAVENOUS
Status: DISCONTINUED | OUTPATIENT
Start: 2021-08-11 | End: 2021-08-11 | Stop reason: HOSPADM

## 2021-08-11 RX ORDER — ONDANSETRON 2 MG/ML
4 INJECTION INTRAMUSCULAR; INTRAVENOUS PRN
Status: DISCONTINUED | OUTPATIENT
Start: 2021-08-11 | End: 2021-08-11 | Stop reason: HOSPADM

## 2021-08-11 RX ORDER — HYDRALAZINE HYDROCHLORIDE 20 MG/ML
10 INJECTION INTRAMUSCULAR; INTRAVENOUS ONCE
Status: COMPLETED | OUTPATIENT
Start: 2021-08-11 | End: 2021-08-11

## 2021-08-11 RX ORDER — HYDRALAZINE HYDROCHLORIDE 20 MG/ML
INJECTION INTRAMUSCULAR; INTRAVENOUS
Status: COMPLETED
Start: 2021-08-11 | End: 2021-08-11

## 2021-08-11 RX ORDER — OXYCODONE HYDROCHLORIDE 5 MG/1
5 TABLET ORAL
Status: DISCONTINUED | OUTPATIENT
Start: 2021-08-11 | End: 2021-08-11 | Stop reason: HOSPADM

## 2021-08-11 RX ORDER — HYDROMORPHONE HYDROCHLORIDE 1 MG/ML
0.5 INJECTION, SOLUTION INTRAMUSCULAR; INTRAVENOUS; SUBCUTANEOUS
Status: DISCONTINUED | OUTPATIENT
Start: 2021-08-11 | End: 2021-08-11 | Stop reason: HOSPADM

## 2021-08-11 RX ORDER — MIDAZOLAM HYDROCHLORIDE 1 MG/ML
.5-2 INJECTION INTRAMUSCULAR; INTRAVENOUS PRN
Status: DISCONTINUED | OUTPATIENT
Start: 2021-08-11 | End: 2021-08-11 | Stop reason: HOSPADM

## 2021-08-11 RX ORDER — MIDAZOLAM HYDROCHLORIDE 1 MG/ML
INJECTION INTRAMUSCULAR; INTRAVENOUS
Status: COMPLETED
Start: 2021-08-11 | End: 2021-08-11

## 2021-08-11 RX ORDER — NALOXONE HYDROCHLORIDE 0.4 MG/ML
INJECTION, SOLUTION INTRAMUSCULAR; INTRAVENOUS; SUBCUTANEOUS
Status: COMPLETED
Start: 2021-08-11 | End: 2021-08-11

## 2021-08-11 RX ORDER — OXYCODONE HYDROCHLORIDE 5 MG/1
10 TABLET ORAL
Status: DISCONTINUED | OUTPATIENT
Start: 2021-08-11 | End: 2021-08-11 | Stop reason: HOSPADM

## 2021-08-11 RX ORDER — ONDANSETRON 2 MG/ML
4 INJECTION INTRAMUSCULAR; INTRAVENOUS EVERY 8 HOURS PRN
Status: DISCONTINUED | OUTPATIENT
Start: 2021-08-11 | End: 2021-08-11 | Stop reason: HOSPADM

## 2021-08-11 RX ORDER — MIDAZOLAM HYDROCHLORIDE 1 MG/ML
INJECTION INTRAMUSCULAR; INTRAVENOUS
Status: DISCONTINUED
Start: 2021-08-11 | End: 2021-08-11 | Stop reason: HOSPADM

## 2021-08-11 RX ADMIN — HYDROMORPHONE HYDROCHLORIDE 0.5 MG: 1 INJECTION, SOLUTION INTRAMUSCULAR; INTRAVENOUS; SUBCUTANEOUS at 09:50

## 2021-08-11 RX ADMIN — FENTANYL CITRATE 50 MCG: 50 INJECTION, SOLUTION INTRAMUSCULAR; INTRAVENOUS at 08:38

## 2021-08-11 RX ADMIN — HYDROMORPHONE HYDROCHLORIDE 0.5 MG: 1 INJECTION, SOLUTION INTRAMUSCULAR; INTRAVENOUS; SUBCUTANEOUS at 10:10

## 2021-08-11 RX ADMIN — SODIUM CHLORIDE 1000 ML: 9 INJECTION, SOLUTION INTRAVENOUS at 07:36

## 2021-08-11 RX ADMIN — HYDRALAZINE HYDROCHLORIDE 10 MG: 20 INJECTION INTRAMUSCULAR; INTRAVENOUS at 11:30

## 2021-08-11 RX ADMIN — MIDAZOLAM HYDROCHLORIDE 2 MG: 1 INJECTION, SOLUTION INTRAMUSCULAR; INTRAVENOUS at 08:37

## 2021-08-11 RX ADMIN — FENTANYL CITRATE 50 MCG: 50 INJECTION, SOLUTION INTRAMUSCULAR; INTRAVENOUS at 08:45

## 2021-08-11 RX ADMIN — HYDRALAZINE HYDROCHLORIDE 10 MG: 20 INJECTION INTRAMUSCULAR; INTRAVENOUS at 08:41

## 2021-08-11 RX ADMIN — OXYCODONE 5 MG: 5 TABLET ORAL at 09:25

## 2021-08-11 RX ADMIN — MIDAZOLAM HYDROCHLORIDE 2 MG: 1 INJECTION, SOLUTION INTRAMUSCULAR; INTRAVENOUS at 08:45

## 2021-08-11 RX ADMIN — HYDRALAZINE HYDROCHLORIDE 10 MG: 20 INJECTION INTRAMUSCULAR; INTRAVENOUS at 08:49

## 2021-08-11 ASSESSMENT — PAIN DESCRIPTION - PAIN TYPE
TYPE: SURGICAL PAIN
TYPE: ACUTE PAIN
TYPE: SURGICAL PAIN

## 2021-08-11 NOTE — PROGRESS NOTES
CT NOTE: LEFT KIDNEY BX COMPLETED BY DR GIANG, SURGIFTOYINM REF 1972 USED. VSS ON RA, PT TOLERATED WELL

## 2021-08-11 NOTE — PROGRESS NOTES
Patient in phase 2 of recovery, states pain is 5/10, no nausea. Pt still on bed rest.   Pt's /84, pt instructed MD has given orders to keep BP under 160.  Wife Shawna at bedside and updated on plan of care before pt can go home.   Hydralazine given, /77  Pt slowly sat up and verbalizes need to void, ambulates slowly to bathroom and voids without difficulty.   Pt verbalizes readiness for discharge. Instructions reviewed with patient and family, all questions answered, no further needs.

## 2021-08-11 NOTE — H&P
History and Physical    Date: 2021    PCP: Anmol Mccray M.D.      CC: Proteinuria    HPI: This is a 60 y.o. male who is presenting CT guided kidney biopsy    Past Medical History:   Diagnosis Date   • Anemia    • Anticoagulation monitoring, special range    • Blood clotting disorder (HCC)     heart   • Cardiomyopathy (HCC)    • Diabetes (HCC)     no longer on medications per MD, 2021   • High cholesterol    • Hypertension    • LV (left ventricular) mural thrombus     on warfarin, no longer on coumadin months ago       Past Surgical History:   Procedure Laterality Date   • OTHER ORTHOPEDIC SURGERY      knee, 40 y ago       No current facility-administered medications for this encounter.        Social History     Socioeconomic History   • Marital status: Single     Spouse name: Not on file   • Number of children: Not on file   • Years of education: Not on file   • Highest education level: Not on file   Occupational History   • Not on file   Tobacco Use   • Smoking status: Former Smoker     Packs/day: 1.00     Years: 20.00     Pack years: 20.00     Types: Cigarettes     Quit date:      Years since quittin.6   • Smokeless tobacco: Never Used   Vaping Use   • Vaping Use: Never used   Substance and Sexual Activity   • Alcohol use: Yes     Comment: 4-6 drinks a week    • Drug use: Yes     Types: Marijuana     Comment: smokes marijuana daily, last night at 1900   • Sexual activity: Not on file   Other Topics Concern   • Not on file   Social History Narrative   • Not on file     Social Determinants of Health     Financial Resource Strain:    • Difficulty of Paying Living Expenses:    Food Insecurity:    • Worried About Running Out of Food in the Last Year:    • Ran Out of Food in the Last Year:    Transportation Needs:    • Lack of Transportation (Medical):    • Lack of Transportation (Non-Medical):    Physical Activity:    • Days of Exercise per Week:    • Minutes of Exercise per Session:    Stress:     • Feeling of Stress :    Social Connections:    • Frequency of Communication with Friends and Family:    • Frequency of Social Gatherings with Friends and Family:    • Attends Rastafarian Services:    • Active Member of Clubs or Organizations:    • Attends Club or Organization Meetings:    • Marital Status:    Intimate Partner Violence:    • Fear of Current or Ex-Partner:    • Emotionally Abused:    • Physically Abused:    • Sexually Abused:        History reviewed. No pertinent family history.    Allergies:  Patient has no known allergies.    Review of Systems:  Negative except for renal dysfunction    Physical Exam    Vital Signs  Blood Pressure: (!) 172/79   Temperature: 35.9 °C (96.6 °F)   Pulse: (!) 47   Respiration: 18   Pulse Oximetry: 99 %        Labs:                    Radiology:  CT-NEEDLE BX-RENAL    (Results Pending)             Assessment and Plan:This is a 60 y.o. presents for CT BX of Left Kidney.

## 2021-08-11 NOTE — DISCHARGE INSTRUCTIONS
ACTIVITY: Rest and take it easy for the first 24 hours.  A responsible adult is recommended to remain with you during that time.  It is normal to feel sleepy.  We encourage you to not do anything that requires balance, judgment or coordination.    MILD FLU-LIKE SYMPTOMS ARE NORMAL. YOU MAY EXPERIENCE GENERALIZED MUSCLE ACHES, THROAT IRRITATION, HEADACHE AND/OR SOME NAUSEA.    FOR 24 HOURS DO NOT:  Drive, operate machinery or run household appliances.  Drink beer or alcoholic beverages.   Make important decisions or sign legal documents.    SPECIAL INSTRUCTIONS:   Percutaneous Kidney Biopsy, Care After  This sheet gives you information about how to care for yourself after your procedure. Your health care provider may also give you more specific instructions. If you have problems or questions, contact your health care provider.  What can I expect after the procedure?  After the procedure, it is common to have:  · Pain or soreness near the area where the needle went through your skin (biopsy site).  · Bright pink or cloudy urine for 24 hours after the procedure.  Follow these instructions at home:  Activity  · Return to your normal activities as told by your health care provider. Ask your health care provider what activities are safe for you.  · Do not drive for 24 hours if you were given a medicine to help you relax (sedative).  · Do not lift anything that is heavier than 10 lb (4.5 kg) until your health care provider tells you that it is safe.  · Avoid activities that take a lot of effort (are strenuous) until your health care provider approves. Most people will have to wait 2 weeks before returning to activities such as exercise or sexual intercourse.  General instructions    · Take over-the-counter and prescription medicines only as told by your health care provider.  · You may eat and drink after your procedure. Follow instructions from your health care provider about eating or drinking restrictions.  · Check  your biopsy site every day for signs of infection. Check for:  ? More redness, swelling, or pain.  ? More fluid or blood.  ? Warmth.  ? Pus or a bad smell.  · Keep all follow-up visits as told by your health care provider. This is important.  Contact a health care provider if:  · You have more redness, swelling, or pain around your biopsy site.  · You have more fluid or blood coming from your biopsy site.  · Your biopsy site feels warm to the touch.  · You have pus or a bad smell coming from your biopsy site.  · You have blood in your urine more than 24 hours after your procedure.  Get help right away if:  · You have dark red or brown urine.  · You have a fever.  · You are unable to urinate.  · You feel burning when you urinate.  · You feel faint.  · You have severe pain in your abdomen or side.  This information is not intended to replace advice given to you by your health care provider. Make sure you discuss any questions you have with your health care provider.  Document Released: 08/20/2014 Document Revised: 11/30/2018 Document Reviewed: 09/29/2017  [employer] Patient Education © 2020 [employer]      DIET: To avoid nausea, slowly advance diet as tolerated, avoiding spicy or greasy foods for the first day.  Add more substantial food to your diet according to your physician's instructions.  Babies can be fed formula or breast milk as soon as they are hungry.  INCREASE FLUIDS AND FIBER TO AVOID CONSTIPATION.    SURGICAL DRESSING/BATHING: Keep your dressing clean, dry and intact for 24 hours. You may remove it and shower, no baths, hot tubs or swimming until cleared by your MD    FOLLOW-UP APPOINTMENT:  A follow-up appointment should be arranged with your doctor; call to schedule.    You should CALL YOUR PHYSICIAN if you develop:  Fever greater than 101 degrees F.  Pain not relieved by medication, or persistent nausea or vomiting.  Excessive bleeding (blood soaking through dressing) or unexpected drainage from the  wound.  Extreme redness or swelling around the incision site, drainage of pus or foul smelling drainage.  Inability to urinate or empty your bladder within 8 hours.  Problems with breathing or chest pain.    You should call 911 if you develop problems with breathing or chest pain.  If you are unable to contact your doctor or surgical center, you should go to the nearest emergency room or urgent care center.  Physician's telephone #: *[phone]/ Dr. Alberto [phone]*    If any questions arise, call your doctor.  If your doctor is not available, please feel free to call the Surgical Center at [phone]. The Contact Center is open Monday through Friday 7AM to 5PM and may speak to a nurse at [phone], or toll free at [phone].     A registered nurse may call you a few days after your surgery to see how you are doing after your procedure.    MEDICATIONS: Resume taking daily medication.  Take prescribed pain medication with food.  If no medication is prescribed, you may take non-aspirin pain medication if needed.  PAIN MEDICATION CAN BE VERY CONSTIPATING.  Take a stool softener or laxative such as senokot, pericolace, or milk of magnesia if needed.    Prescription given, you may take Tylenol or Motrin as needed for pain.  Last oral pain medication (oxycodone) given at 0925 am.    If your physician has prescribed pain medication that includes Acetaminophen (Tylenol), do not take additional Acetaminophen (Tylenol) while taking the prescribed medication.    Depression / Suicide Risk    As you are discharged from this [facility] facility, it is important to learn how to keep safe from harming yourself.    Recognize the warning signs:  · Abrupt changes in personality, positive or negative- including increase in energy   · Giving away possessions  · Change in eating patterns- significant weight changes-  positive or negative  · Change in sleeping patterns- unable to sleep or sleeping all the  time   · Unwillingness or inability to communicate  · Depression  · Unusual sadness, discouragement and loneliness  · Talk of wanting to die  · Neglect of personal appearance   · Rebelliousness- reckless behavior  · Withdrawal from people/activities they love  · Confusion- inability to concentrate     If you or a loved one observes any of these behaviors or has concerns about self-harm, here's what you can do:  · Talk about it- your feelings and reasons for harming yourself  · Remove any means that you might use to hurt yourself (examples: pills, rope, extension cords, firearm)  · Get professional help from the community (Mental Health, Substance Abuse, psychological counseling)  · Do not be alone:Call your Safe Contact- someone whom you trust who will be there for you.  · Call your local CRISIS HOTLINE 053-0228 or 806-735-4955  · Call your local Children's Mobile Crisis Response Team [place] (789) 292-9845 or www.[employer]  · Call the toll free National Suicide Prevention Hotlines   · National Suicide Prevention Lifeline 407-237-KEIP (0961)  · National Hope Line Network 800-SUICIDE (588-6006)

## 2021-08-11 NOTE — OR SURGEON
Immediate Post- Operative Note        PostOp Diagnosis: Proteinuria    Procedure(s): CT Guided Left Renal Biopsy    Estimated Blood Loss: Less than 5 ml    Complications: Small Left Perinephric Hematoma      8/11/2021     8:59 AM     Dwaine Frederick M.D.

## 2021-08-11 NOTE — PROGRESS NOTES
Patient in pre-op, assessment completed, patient and wife kiran updated on plan of care, all questions answered, no further needs at this time, call light within reach.

## 2021-08-11 NOTE — OR NURSING
0910: Pt arrives to PACU from IR awake and alert. Dressing to left posterior flank area is CDI. VSS. Pt is to lay flat for 2 hours.     0925: Pt c/o pain- medicated per MAR. Tolerating sips of water.    0930: Pts wife called and updated on pt status. Dressing CDI    1040: Pt states pain is tolerable and denies nausea. Report called to Svitlana JUNG. Dressing CDI.

## 2021-10-06 ENCOUNTER — TELEPHONE (OUTPATIENT)
Dept: CARDIOLOGY | Facility: MEDICAL CENTER | Age: 61
End: 2021-10-06

## 2022-04-08 ENCOUNTER — APPOINTMENT (OUTPATIENT)
Dept: RADIOLOGY | Facility: MEDICAL CENTER | Age: 62
End: 2022-04-08
Attending: EMERGENCY MEDICINE
Payer: MEDICAID

## 2022-04-08 ENCOUNTER — HOSPITAL ENCOUNTER (EMERGENCY)
Facility: MEDICAL CENTER | Age: 62
End: 2022-04-08
Attending: EMERGENCY MEDICINE
Payer: MEDICAID

## 2022-04-08 VITALS
HEART RATE: 58 BPM | HEIGHT: 70 IN | SYSTOLIC BLOOD PRESSURE: 182 MMHG | DIASTOLIC BLOOD PRESSURE: 86 MMHG | RESPIRATION RATE: 18 BRPM | OXYGEN SATURATION: 95 % | WEIGHT: 209.88 LBS | BODY MASS INDEX: 30.05 KG/M2 | TEMPERATURE: 97 F

## 2022-04-08 DIAGNOSIS — R05.9 COUGH: ICD-10-CM

## 2022-04-08 DIAGNOSIS — J18.9 PNEUMONIA OF RIGHT LOWER LOBE DUE TO INFECTIOUS ORGANISM: ICD-10-CM

## 2022-04-08 PROCEDURE — 71046 X-RAY EXAM CHEST 2 VIEWS: CPT

## 2022-04-08 PROCEDURE — 700102 HCHG RX REV CODE 250 W/ 637 OVERRIDE(OP): Performed by: EMERGENCY MEDICINE

## 2022-04-08 PROCEDURE — 99283 EMERGENCY DEPT VISIT LOW MDM: CPT

## 2022-04-08 PROCEDURE — A9270 NON-COVERED ITEM OR SERVICE: HCPCS | Performed by: EMERGENCY MEDICINE

## 2022-04-08 RX ORDER — AMOXICILLIN AND CLAVULANATE POTASSIUM 875; 125 MG/1; MG/1
1 TABLET, FILM COATED ORAL ONCE
Status: COMPLETED | OUTPATIENT
Start: 2022-04-08 | End: 2022-04-08

## 2022-04-08 RX ORDER — AZITHROMYCIN 250 MG/1
250 TABLET, FILM COATED ORAL DAILY
Qty: 4 TABLET | Refills: 0 | Status: SHIPPED | OUTPATIENT
Start: 2022-04-08 | End: 2022-04-12

## 2022-04-08 RX ORDER — AMOXICILLIN AND CLAVULANATE POTASSIUM 875; 125 MG/1; MG/1
1 TABLET, FILM COATED ORAL 2 TIMES DAILY
Qty: 10 TABLET | Refills: 0 | Status: SHIPPED | OUTPATIENT
Start: 2022-04-08 | End: 2022-04-13

## 2022-04-08 RX ORDER — AZITHROMYCIN 250 MG/1
500 TABLET, FILM COATED ORAL ONCE
Status: COMPLETED | OUTPATIENT
Start: 2022-04-08 | End: 2022-04-08

## 2022-04-08 RX ADMIN — AZITHROMYCIN MONOHYDRATE 500 MG: 250 TABLET ORAL at 11:18

## 2022-04-08 RX ADMIN — AMOXICILLIN AND CLAVULANATE POTASSIUM 1 TABLET: 875; 125 TABLET, FILM COATED ORAL at 11:18

## 2022-04-08 ASSESSMENT — ENCOUNTER SYMPTOMS
SHORTNESS OF BREATH: 0
COUGH: 1
SPUTUM PRODUCTION: 1
ABDOMINAL PAIN: 0
FALLS: 0
HEADACHES: 0
WHEEZING: 0
CHILLS: 1
FEVER: 0
VOMITING: 0

## 2022-04-08 NOTE — ED TRIAGE NOTES
"Chief Complaint   Patient presents with   • Cough     Pt has had productive cough and congestionx 1 week. Pt states sputum is green. Pt denies fevers, N/V       Pt walk in for above. Pt states losing his voice as well. Pt aox4, GCS 15. Pt states having some chills as well. Educated pt on triage process and to notify if there is any change.    BP (!) 166/89   Pulse 72   Temp 36.1 °C (96.9 °F) (Temporal)   Resp 18   Ht 1.778 m (5' 10\")   Wt 95.2 kg (209 lb 14.1 oz)   SpO2 95%   BMI 30.11 kg/m²     "

## 2022-04-08 NOTE — ED NOTES
Pt. Continues to rest in bed. Respirations even and unlabored; monitors remain in place. Light dimmed per request. Pt. Denies further needs.

## 2022-04-08 NOTE — ED PROVIDER NOTES
ED Provider Note    ED Provider Note    Primary care provider: Anmol Mccray M.D.  Means of arrival: POV  History obtained from: patient, wife  History limited by: None    CHIEF COMPLAINT  Chief Complaint   Patient presents with   • Cough     Pt has had productive cough and congestionx 1 week. Pt states sputum is green. Pt denies fevers, N/V       HPI  Donte Horta is a 61 y.o. male who presents to the Emergency Department accompanied by his wife with a chief complaint of a productive cough congestion that he has had for 10 days possibly even 2 weeks.  Multiple family members had similar symptoms but he is the only 1 who has had persistent symptoms.  He denies having a fever though he also has not checked.  He denies pain other than some mild throat pain and he states he has been gargling with salt water.  No ear pain.  His cough is productive of greenish-yellow sputum.  He has had an associated decreased appetite but he is taking fluids and urinating normally.  He does have a PCP and a scheduled appointment next week but he did not attempt to call them with the symptoms.   He has a history of hypertension and diabetes.  However, he is not medicated for diabetes.  He denies any shortness of breath or chest pain.  He is a former smoker.  He has been taking over-the-counter allergy medications as she initially thought it was allergies.  States typically he gets better after cough or cold symptoms but this one seems to be lingering.  He is vaccinated against COVID.    REVIEW OF SYSTEMS  Review of Systems   Constitutional: Positive for chills. Negative for fever.   HENT: Positive for congestion. Negative for ear pain.    Respiratory: Positive for cough and sputum production. Negative for shortness of breath and wheezing.    Cardiovascular: Negative for chest pain.   Gastrointestinal: Negative for abdominal pain and vomiting.   Musculoskeletal: Negative for falls.   Skin: Negative for rash.   Neurological:  "Negative for headaches.   All other systems reviewed and are negative.      PAST MEDICAL HISTORY   has a past medical history of Anemia, Anticoagulation monitoring, special range, Blood clotting disorder (HCC), Cardiomyopathy (HCC), Diabetes (HCC), High cholesterol, Hypertension, and LV (left ventricular) mural thrombus.    SURGICAL HISTORY   has a past surgical history that includes other orthopedic surgery.    SOCIAL HISTORY  Social History     Tobacco Use   • Smoking status: Former Smoker     Packs/day: 1.00     Years: 20.00     Pack years: 20.00     Types: Cigarettes     Quit date:      Years since quittin.2   • Smokeless tobacco: Never Used   Vaping Use   • Vaping Use: Never used   Substance Use Topics   • Alcohol use: Yes     Comment: 4-6 drinks a week    • Drug use: Yes     Types: Marijuana     Comment: smokes marijuana daily, last night at 1900      Social History     Substance and Sexual Activity   Drug Use Yes   • Types: Marijuana    Comment: smokes marijuana daily, last night at 1900       FAMILY HISTORY  No family history on file.    CURRENT MEDICATIONS  Home Medications     Reviewed by Lynne Nichols R.N. (Registered Nurse) on 22 at 0813  Med List Status: Partial   Medication Last Dose Status   atorvastatin (LIPITOR) 40 MG Tab  Active   CALCITRIOL PO  Active   carvedilol (COREG) 25 MG Tab  Active   diphenhydrAMINE HCl (ALLERGY MED PO)  Active   ferrous sulfate (FEROSUL) 325 (65 Fe) MG tablet  Active   furosemide (LASIX) 20 MG Tab  Active   losartan (COZAAR) 25 MG Tab  Active                ALLERGIES  No Known Allergies    PHYSICAL EXAM  VITAL SIGNS: BP (!) 182/86   Pulse (!) 58   Temp 36.1 °C (97 °F)   Resp 18   Ht 1.778 m (5' 10\")   Wt 95.2 kg (209 lb 14.1 oz)   SpO2 95%   BMI 30.11 kg/m²   Vitals reviewed.  Constitutional: Patient is oriented to person, place, and time. Appears well-developed and well-nourished. No distress.    Head: Normocephalic and atraumatic.   Ears: Normal " external ears bilaterally.  TMs are normal bilaterally.  Mouth/Throat: Oropharynx is clear and moist, no erythema, no exudates.   Eyes: Conjunctivae are normal. Pupils are equal and round.  Neck: Normal range of motion. Neck supple.  Cardiovascular: Normal rate, regular rhythm and normal heart sounds. Normal peripheral pulses.  Pulmonary/Chest: Effort normal and breath sounds normal, aside from scant, crackles, right side lower. No respiratory distress, no wheezes or rales. No chest wall tenderness.  Abdominal: Soft. Bowel sounds are normal. There is no tenderness. No rebound or guarding, or peritoneal signs.  Musculoskeletal: No edema and no tenderness.   Lymphadenopathy: No cervical adenopathy.   Neurological: No focal deficits.   Skin: Skin is warm and dry. No erythema. No pallor.   Psychiatric: Patient has a normal mood and affect.     RADIOLOGY  DX-CHEST-2 VIEWS   Final Result      1.  Probable RIGHT lower lobe pneumonia.   2.  Mild LEFT lung base atelectasis.   3.  Small bilateral pleural effusions.        The radiologist's interpretation of all radiological studies have been reviewed by me.    COURSE & MEDICAL DECISION MAKING  Pertinent Labs & Imaging studies reviewed. (See chart for details)    Obtained and reviewed past medical records.  Patient's most recent encounter was in August 2021 he was seen for a renal biopsy.  No prior ED encounters.    9:16 AM - Patient seen and examined at bedside.  This is a pleasant 61-year-old male.  He reports a productive cough for up to 2 weeks.  No report of fever.  No chest pain or symptoms suggestive of ACS.  He does have a history of diabetes which she is treating with diet and lifestyle changes.  He is not currently medicated.  He has hypertension.  He has an appointment with his PCP next week.  Have ordered x-ray for further evaluation given its persistent nature.     1055AM patient's reevaluated bedside.  X-ray does show evidence of a right lower lobe pneumonia.   The patient does not have increased work of breathing.  He is not hypoxic or tachypneic.  He is not tachycardic.  I do feel he is a candidate for discharge home with outpatient therapy.  He will be treated here in the ED with oral antibiotics.  He has a follow-up appointment with his primary care doctor next week on Wednesday.  I have suggested that he call and be seen on Monday.  Despite that, he is given strict return precautions and I have encouraged him to return to the emergency department if he feels he is worsening over the weekend.  Patient is discharged home in stable condition.    FINAL IMPRESSION  1. Pneumonia of right lower lobe due to infectious organism    2. Cough

## 2022-04-08 NOTE — ED NOTES
Pt. Left ambulatory, in NAD and with wife. Pt. Verbalized understanding of home monitoring, rx to be filled and f/u. Denies further questions at time of discharge.

## 2023-11-20 ENCOUNTER — APPOINTMENT (OUTPATIENT)
Dept: RADIOLOGY | Facility: MEDICAL CENTER | Age: 63
DRG: 640 | End: 2023-11-20
Attending: EMERGENCY MEDICINE
Payer: MEDICAID

## 2023-11-20 ENCOUNTER — HOSPITAL ENCOUNTER (INPATIENT)
Facility: MEDICAL CENTER | Age: 63
LOS: 9 days | DRG: 640 | End: 2023-11-30
Attending: EMERGENCY MEDICINE | Admitting: INTERNAL MEDICINE
Payer: MEDICAID

## 2023-11-20 DIAGNOSIS — E87.5 HYPERKALEMIA: ICD-10-CM

## 2023-11-20 DIAGNOSIS — N17.9 ACUTE RENAL FAILURE, UNSPECIFIED ACUTE RENAL FAILURE TYPE (HCC): ICD-10-CM

## 2023-11-20 DIAGNOSIS — N18.6 ESRD (END STAGE RENAL DISEASE) ON DIALYSIS (HCC): ICD-10-CM

## 2023-11-20 DIAGNOSIS — N17.9 ACUTE RENAL FAILURE SUPERIMPOSED ON STAGE 5 CHRONIC KIDNEY DISEASE, NOT ON CHRONIC DIALYSIS, UNSPECIFIED ACUTE RENAL FAILURE TYPE (HCC): Primary | ICD-10-CM

## 2023-11-20 DIAGNOSIS — D64.9 ANEMIA, UNSPECIFIED TYPE: ICD-10-CM

## 2023-11-20 DIAGNOSIS — N18.5 ACUTE RENAL FAILURE SUPERIMPOSED ON STAGE 5 CHRONIC KIDNEY DISEASE, NOT ON CHRONIC DIALYSIS, UNSPECIFIED ACUTE RENAL FAILURE TYPE (HCC): Primary | ICD-10-CM

## 2023-11-20 DIAGNOSIS — R00.1 BRADYCARDIA: ICD-10-CM

## 2023-11-20 DIAGNOSIS — I50.9 ACUTE ON CHRONIC CONGESTIVE HEART FAILURE, UNSPECIFIED HEART FAILURE TYPE (HCC): ICD-10-CM

## 2023-11-20 DIAGNOSIS — J96.01 ACUTE HYPOXEMIC RESPIRATORY FAILURE (HCC): ICD-10-CM

## 2023-11-20 DIAGNOSIS — I24.0 LV (LEFT VENTRICULAR) MURAL THROMBUS WITHOUT MI (HCC): ICD-10-CM

## 2023-11-20 DIAGNOSIS — Z99.2 ESRD (END STAGE RENAL DISEASE) ON DIALYSIS (HCC): ICD-10-CM

## 2023-11-20 LAB
BASOPHILS # BLD AUTO: 0.1 % (ref 0–1.8)
BASOPHILS # BLD: 0.01 K/UL (ref 0–0.12)
EOSINOPHIL # BLD AUTO: 0.12 K/UL (ref 0–0.51)
EOSINOPHIL NFR BLD: 1.6 % (ref 0–6.9)
ERYTHROCYTE [DISTWIDTH] IN BLOOD BY AUTOMATED COUNT: 50.8 FL (ref 35.9–50)
HCT VFR BLD AUTO: 25.3 % (ref 42–52)
HGB BLD-MCNC: 8.3 G/DL (ref 14–18)
IMM GRANULOCYTES # BLD AUTO: 0.08 K/UL (ref 0–0.11)
IMM GRANULOCYTES NFR BLD AUTO: 1.1 % (ref 0–0.9)
LYMPHOCYTES # BLD AUTO: 1.16 K/UL (ref 1–4.8)
LYMPHOCYTES NFR BLD: 15.7 % (ref 22–41)
MCH RBC QN AUTO: 30 PG (ref 27–33)
MCHC RBC AUTO-ENTMCNC: 32.8 G/DL (ref 32.3–36.5)
MCV RBC AUTO: 91.3 FL (ref 81.4–97.8)
MONOCYTES # BLD AUTO: 0.68 K/UL (ref 0–0.85)
MONOCYTES NFR BLD AUTO: 9.2 % (ref 0–13.4)
NEUTROPHILS # BLD AUTO: 5.35 K/UL (ref 1.82–7.42)
NEUTROPHILS NFR BLD: 72.3 % (ref 44–72)
NRBC # BLD AUTO: 0.04 K/UL
NRBC BLD-RTO: 0.5 /100 WBC (ref 0–0.2)
PLATELET # BLD AUTO: 245 K/UL (ref 164–446)
PMV BLD AUTO: 10.9 FL (ref 9–12.9)
RBC # BLD AUTO: 2.77 M/UL (ref 4.7–6.1)
WBC # BLD AUTO: 7.4 K/UL (ref 4.8–10.8)

## 2023-11-20 PROCEDURE — 80053 COMPREHEN METABOLIC PANEL: CPT

## 2023-11-20 PROCEDURE — 700111 HCHG RX REV CODE 636 W/ 250 OVERRIDE (IP): Mod: UD | Performed by: EMERGENCY MEDICINE

## 2023-11-20 PROCEDURE — 93005 ELECTROCARDIOGRAM TRACING: CPT

## 2023-11-20 PROCEDURE — 93005 ELECTROCARDIOGRAM TRACING: CPT | Performed by: EMERGENCY MEDICINE

## 2023-11-20 PROCEDURE — 84100 ASSAY OF PHOSPHORUS: CPT

## 2023-11-20 PROCEDURE — 36415 COLL VENOUS BLD VENIPUNCTURE: CPT

## 2023-11-20 PROCEDURE — 83880 ASSAY OF NATRIURETIC PEPTIDE: CPT

## 2023-11-20 PROCEDURE — 96375 TX/PRO/DX INJ NEW DRUG ADDON: CPT

## 2023-11-20 PROCEDURE — 71045 X-RAY EXAM CHEST 1 VIEW: CPT

## 2023-11-20 PROCEDURE — 85025 COMPLETE CBC W/AUTO DIFF WBC: CPT

## 2023-11-20 PROCEDURE — 99291 CRITICAL CARE FIRST HOUR: CPT

## 2023-11-20 PROCEDURE — 83735 ASSAY OF MAGNESIUM: CPT

## 2023-11-20 PROCEDURE — 84484 ASSAY OF TROPONIN QUANT: CPT

## 2023-11-20 RX ADMIN — CALCIUM CHLORIDE 1 G: 100 INJECTION, SOLUTION INTRAVENOUS; INTRAVENTRICULAR at 23:26

## 2023-11-21 ENCOUNTER — HOME CARE VISIT (OUTPATIENT)
Dept: HOSPICE | Facility: HOSPICE | Age: 63
End: 2023-11-21
Payer: MEDICAID

## 2023-11-21 ENCOUNTER — HOSPICE ADMISSION (OUTPATIENT)
Dept: HOSPICE | Facility: HOSPICE | Age: 63
End: 2023-11-21
Payer: MEDICAID

## 2023-11-21 PROBLEM — Z71.89 ADVANCE CARE PLANNING: Status: ACTIVE | Noted: 2023-11-21

## 2023-11-21 PROBLEM — I10 HYPERTENSION: Status: ACTIVE | Noted: 2023-11-21

## 2023-11-21 PROBLEM — Z91.199 NONCOMPLIANCE: Chronic | Status: ACTIVE | Noted: 2023-11-21

## 2023-11-21 PROBLEM — D63.1 ANEMIA DUE TO CHRONIC KIDNEY DISEASE: Status: ACTIVE | Noted: 2023-11-21

## 2023-11-21 PROBLEM — N17.9 ACUTE RENAL FAILURE SUPERIMPOSED ON CHRONIC KIDNEY DISEASE (HCC): Status: ACTIVE | Noted: 2023-11-21

## 2023-11-21 PROBLEM — Z87.891 FORMER SMOKER: Status: ACTIVE | Noted: 2023-11-21

## 2023-11-21 PROBLEM — E87.1 HYPONATREMIA: Status: ACTIVE | Noted: 2023-11-21

## 2023-11-21 PROBLEM — E83.39 HYPERPHOSPHATEMIA ASSOCIATED WITH RENAL FAILURE: Status: ACTIVE | Noted: 2023-11-21

## 2023-11-21 PROBLEM — N19 HYPERPHOSPHATEMIA ASSOCIATED WITH RENAL FAILURE: Status: RESOLVED | Noted: 2023-11-21 | Resolved: 2023-11-21

## 2023-11-21 PROBLEM — N19 HYPERPHOSPHATEMIA ASSOCIATED WITH RENAL FAILURE: Status: ACTIVE | Noted: 2023-11-21

## 2023-11-21 PROBLEM — E87.5 HYPERKALEMIA: Status: ACTIVE | Noted: 2023-11-21

## 2023-11-21 PROBLEM — E87.20 METABOLIC ACIDOSIS: Status: ACTIVE | Noted: 2023-11-21

## 2023-11-21 PROBLEM — E83.39 HYPERPHOSPHATEMIA ASSOCIATED WITH RENAL FAILURE: Status: RESOLVED | Noted: 2023-11-21 | Resolved: 2023-11-21

## 2023-11-21 PROBLEM — N18.9 ACUTE RENAL FAILURE SUPERIMPOSED ON CHRONIC KIDNEY DISEASE (HCC): Status: ACTIVE | Noted: 2023-11-21

## 2023-11-21 PROBLEM — N18.9 ANEMIA DUE TO CHRONIC KIDNEY DISEASE: Status: ACTIVE | Noted: 2023-11-21

## 2023-11-21 PROBLEM — E83.51 HYPOCALCEMIA: Status: ACTIVE | Noted: 2023-11-21

## 2023-11-21 LAB
ALBUMIN SERPL BCP-MCNC: 3.7 G/DL (ref 3.2–4.9)
ALBUMIN/GLOB SERPL: 1.5 G/DL
ALP SERPL-CCNC: 123 U/L (ref 30–99)
ALT SERPL-CCNC: 148 U/L (ref 2–50)
ANION GAP SERPL CALC-SCNC: 12 MMOL/L (ref 7–16)
ANION GAP SERPL CALC-SCNC: 13 MMOL/L (ref 7–16)
ANION GAP SERPL CALC-SCNC: 13 MMOL/L (ref 7–16)
ANION GAP SERPL CALC-SCNC: 15 MMOL/L (ref 7–16)
ANION GAP SERPL CALC-SCNC: 15 MMOL/L (ref 7–16)
ANION GAP SERPL CALC-SCNC: 16 MMOL/L (ref 7–16)
AST SERPL-CCNC: 151 U/L (ref 12–45)
BASE EXCESS BLDA CALC-SCNC: -18 MMOL/L (ref -4–3)
BILIRUB SERPL-MCNC: 0.3 MG/DL (ref 0.1–1.5)
BODY TEMPERATURE: ABNORMAL DEGREES
BUN SERPL-MCNC: 72 MG/DL (ref 8–22)
BUN SERPL-MCNC: 74 MG/DL (ref 8–22)
BUN SERPL-MCNC: 76 MG/DL (ref 8–22)
BUN SERPL-MCNC: 78 MG/DL (ref 8–22)
BUN SERPL-MCNC: 79 MG/DL (ref 8–22)
BUN SERPL-MCNC: 80 MG/DL (ref 8–22)
CA-I BLD ISE-SCNC: 1.35 MMOL/L (ref 1.1–1.3)
CALCIUM ALBUM COR SERPL-MCNC: 7.7 MG/DL (ref 8.5–10.5)
CALCIUM SERPL-MCNC: 7.5 MG/DL (ref 8.5–10.5)
CALCIUM SERPL-MCNC: 7.8 MG/DL (ref 8.5–10.5)
CALCIUM SERPL-MCNC: 8.4 MG/DL (ref 8.5–10.5)
CALCIUM SERPL-MCNC: 8.4 MG/DL (ref 8.5–10.5)
CALCIUM SERPL-MCNC: 8.5 MG/DL (ref 8.5–10.5)
CALCIUM SERPL-MCNC: 9 MG/DL (ref 8.5–10.5)
CHLORIDE SERPL-SCNC: 102 MMOL/L (ref 96–112)
CHLORIDE SERPL-SCNC: 105 MMOL/L (ref 96–112)
CHLORIDE SERPL-SCNC: 106 MMOL/L (ref 96–112)
CHLORIDE SERPL-SCNC: 106 MMOL/L (ref 96–112)
CHLORIDE SERPL-SCNC: 109 MMOL/L (ref 96–112)
CHLORIDE SERPL-SCNC: 111 MMOL/L (ref 96–112)
CO2 BLDA-SCNC: 10 MMOL/L (ref 20–33)
CO2 SERPL-SCNC: 10 MMOL/L (ref 20–33)
CO2 SERPL-SCNC: 10 MMOL/L (ref 20–33)
CO2 SERPL-SCNC: 13 MMOL/L (ref 20–33)
CO2 SERPL-SCNC: 18 MMOL/L (ref 20–33)
CO2 SERPL-SCNC: 8 MMOL/L (ref 20–33)
CO2 SERPL-SCNC: 9 MMOL/L (ref 20–33)
CREAT SERPL-MCNC: 7.62 MG/DL (ref 0.5–1.4)
CREAT SERPL-MCNC: 7.74 MG/DL (ref 0.5–1.4)
CREAT SERPL-MCNC: 7.92 MG/DL (ref 0.5–1.4)
CREAT SERPL-MCNC: 8.07 MG/DL (ref 0.5–1.4)
CREAT SERPL-MCNC: 8.12 MG/DL (ref 0.5–1.4)
CREAT SERPL-MCNC: 8.46 MG/DL (ref 0.5–1.4)
DELSYS IDSYS: ABNORMAL
EKG IMPRESSION: NORMAL
GFR SERPLBLD CREATININE-BSD FMLA CKD-EPI: 7 ML/MIN/1.73 M 2
GLOBULIN SER CALC-MCNC: 2.5 G/DL (ref 1.9–3.5)
GLUCOSE BLD STRIP.AUTO-MCNC: 128 MG/DL (ref 65–99)
GLUCOSE BLD STRIP.AUTO-MCNC: 139 MG/DL (ref 65–99)
GLUCOSE BLD STRIP.AUTO-MCNC: 166 MG/DL (ref 65–99)
GLUCOSE BLD STRIP.AUTO-MCNC: 75 MG/DL (ref 65–99)
GLUCOSE SERPL-MCNC: 139 MG/DL (ref 65–99)
GLUCOSE SERPL-MCNC: 143 MG/DL (ref 65–99)
GLUCOSE SERPL-MCNC: 145 MG/DL (ref 65–99)
GLUCOSE SERPL-MCNC: 147 MG/DL (ref 65–99)
GLUCOSE SERPL-MCNC: 163 MG/DL (ref 65–99)
GLUCOSE SERPL-MCNC: 53 MG/DL (ref 65–99)
HCO3 BLDA-SCNC: 8.8 MMOL/L (ref 17–25)
LPM ILPM: 2 LPM
MAGNESIUM SERPL-MCNC: 1.8 MG/DL (ref 1.5–2.5)
NT-PROBNP SERPL IA-MCNC: ABNORMAL PG/ML (ref 0–125)
PCO2 BLDA: 23.5 MMHG (ref 26–37)
PCO2 TEMP ADJ BLDA: 23 MMHG (ref 26–37)
PH BLDA: 7.18 [PH] (ref 7.4–7.5)
PH TEMP ADJ BLDA: 7.19 [PH] (ref 7.4–7.5)
PHOSPHATE SERPL-MCNC: 6.1 MG/DL (ref 2.5–4.5)
PO2 BLDA: 82 MMHG (ref 64–87)
PO2 TEMP ADJ BLDA: 80 MMHG (ref 64–87)
POTASSIUM BLD-SCNC: 5.9 MMOL/L (ref 3.6–5.5)
POTASSIUM SERPL-SCNC: 4.2 MMOL/L (ref 3.6–5.5)
POTASSIUM SERPL-SCNC: 5.2 MMOL/L (ref 3.6–5.5)
POTASSIUM SERPL-SCNC: 5.8 MMOL/L (ref 3.6–5.5)
POTASSIUM SERPL-SCNC: 6 MMOL/L (ref 3.6–5.5)
POTASSIUM SERPL-SCNC: 6 MMOL/L (ref 3.6–5.5)
POTASSIUM SERPL-SCNC: 8 MMOL/L (ref 3.6–5.5)
PROT SERPL-MCNC: 6.2 G/DL (ref 6–8.2)
SAO2 % BLDA: 93 % (ref 93–99)
SODIUM BLD-SCNC: 132 MMOL/L (ref 135–145)
SODIUM SERPL-SCNC: 129 MMOL/L (ref 135–145)
SODIUM SERPL-SCNC: 131 MMOL/L (ref 135–145)
SODIUM SERPL-SCNC: 131 MMOL/L (ref 135–145)
SODIUM SERPL-SCNC: 133 MMOL/L (ref 135–145)
SODIUM SERPL-SCNC: 133 MMOL/L (ref 135–145)
SODIUM SERPL-SCNC: 134 MMOL/L (ref 135–145)
SPECIMEN DRAWN FROM PATIENT: ABNORMAL
TROPONIN T SERPL-MCNC: 55 NG/L (ref 6–19)
TROPONIN T SERPL-MCNC: 56 NG/L (ref 6–19)

## 2023-11-21 PROCEDURE — 96375 TX/PRO/DX INJ NEW DRUG ADDON: CPT

## 2023-11-21 PROCEDURE — 96365 THER/PROPH/DIAG IV INF INIT: CPT

## 2023-11-21 PROCEDURE — 700111 HCHG RX REV CODE 636 W/ 250 OVERRIDE (IP): Performed by: NURSE PRACTITIONER

## 2023-11-21 PROCEDURE — 82962 GLUCOSE BLOOD TEST: CPT

## 2023-11-21 PROCEDURE — 36415 COLL VENOUS BLD VENIPUNCTURE: CPT

## 2023-11-21 PROCEDURE — 700111 HCHG RX REV CODE 636 W/ 250 OVERRIDE (IP): Mod: JZ | Performed by: NURSE PRACTITIONER

## 2023-11-21 PROCEDURE — C9399 UNCLASSIFIED DRUGS OR BIOLOG: HCPCS | Mod: UD | Performed by: EMERGENCY MEDICINE

## 2023-11-21 PROCEDURE — 770022 HCHG ROOM/CARE - ICU (200)

## 2023-11-21 PROCEDURE — 700105 HCHG RX REV CODE 258: Performed by: NURSE PRACTITIONER

## 2023-11-21 PROCEDURE — A9270 NON-COVERED ITEM OR SERVICE: HCPCS | Performed by: NURSE PRACTITIONER

## 2023-11-21 PROCEDURE — 700111 HCHG RX REV CODE 636 W/ 250 OVERRIDE (IP)

## 2023-11-21 PROCEDURE — 99253 IP/OBS CNSLTJ NEW/EST LOW 45: CPT | Mod: 25 | Performed by: STUDENT IN AN ORGANIZED HEALTH CARE EDUCATION/TRAINING PROGRAM

## 2023-11-21 PROCEDURE — 700102 HCHG RX REV CODE 250 W/ 637 OVERRIDE(OP): Performed by: NURSE PRACTITIONER

## 2023-11-21 PROCEDURE — 96376 TX/PRO/DX INJ SAME DRUG ADON: CPT

## 2023-11-21 PROCEDURE — 84132 ASSAY OF SERUM POTASSIUM: CPT

## 2023-11-21 PROCEDURE — 700102 HCHG RX REV CODE 250 W/ 637 OVERRIDE(OP): Performed by: HOSPITALIST

## 2023-11-21 PROCEDURE — 99291 CRITICAL CARE FIRST HOUR: CPT | Mod: GC | Performed by: INTERNAL MEDICINE

## 2023-11-21 PROCEDURE — 99254 IP/OBS CNSLTJ NEW/EST MOD 60: CPT | Mod: 25 | Performed by: HOSPITALIST

## 2023-11-21 PROCEDURE — 36600 WITHDRAWAL OF ARTERIAL BLOOD: CPT

## 2023-11-21 PROCEDURE — 700111 HCHG RX REV CODE 636 W/ 250 OVERRIDE (IP): Performed by: INTERNAL MEDICINE

## 2023-11-21 PROCEDURE — 700111 HCHG RX REV CODE 636 W/ 250 OVERRIDE (IP): Mod: JZ | Performed by: HOSPITALIST

## 2023-11-21 PROCEDURE — 84295 ASSAY OF SERUM SODIUM: CPT

## 2023-11-21 PROCEDURE — C9399 UNCLASSIFIED DRUGS OR BIOLOG: HCPCS | Performed by: INTERNAL MEDICINE

## 2023-11-21 PROCEDURE — 99497 ADVNCD CARE PLAN 30 MIN: CPT | Performed by: HOSPITALIST

## 2023-11-21 PROCEDURE — 700102 HCHG RX REV CODE 250 W/ 637 OVERRIDE(OP): Performed by: INTERNAL MEDICINE

## 2023-11-21 PROCEDURE — 94640 AIRWAY INHALATION TREATMENT: CPT

## 2023-11-21 PROCEDURE — 82330 ASSAY OF CALCIUM: CPT

## 2023-11-21 PROCEDURE — 700111 HCHG RX REV CODE 636 W/ 250 OVERRIDE (IP): Mod: JZ,UD | Performed by: EMERGENCY MEDICINE

## 2023-11-21 PROCEDURE — 82803 BLOOD GASES ANY COMBINATION: CPT

## 2023-11-21 PROCEDURE — A9270 NON-COVERED ITEM OR SERVICE: HCPCS | Performed by: HOSPITALIST

## 2023-11-21 PROCEDURE — 84484 ASSAY OF TROPONIN QUANT: CPT

## 2023-11-21 PROCEDURE — 80048 BASIC METABOLIC PNL TOTAL CA: CPT

## 2023-11-21 PROCEDURE — 700102 HCHG RX REV CODE 250 W/ 637 OVERRIDE(OP): Mod: UD | Performed by: EMERGENCY MEDICINE

## 2023-11-21 PROCEDURE — 51798 US URINE CAPACITY MEASURE: CPT

## 2023-11-21 PROCEDURE — 99497 ADVNCD CARE PLAN 30 MIN: CPT | Performed by: STUDENT IN AN ORGANIZED HEALTH CARE EDUCATION/TRAINING PROGRAM

## 2023-11-21 PROCEDURE — 700101 HCHG RX REV CODE 250: Mod: UD | Performed by: EMERGENCY MEDICINE

## 2023-11-21 PROCEDURE — 700105 HCHG RX REV CODE 258: Performed by: EMERGENCY MEDICINE

## 2023-11-21 RX ORDER — INSULIN LISPRO 100 [IU]/ML
1-6 INJECTION, SOLUTION INTRAVENOUS; SUBCUTANEOUS
Status: DISCONTINUED | OUTPATIENT
Start: 2023-11-21 | End: 2023-11-28

## 2023-11-21 RX ORDER — HEPARIN SODIUM 5000 [USP'U]/ML
5000 INJECTION, SOLUTION INTRAVENOUS; SUBCUTANEOUS EVERY 12 HOURS
Status: DISCONTINUED | OUTPATIENT
Start: 2023-11-21 | End: 2023-11-30 | Stop reason: HOSPADM

## 2023-11-21 RX ORDER — INSULIN LISPRO 100 [IU]/ML
1-6 INJECTION, SOLUTION INTRAVENOUS; SUBCUTANEOUS EVERY 6 HOURS
Status: DISCONTINUED | OUTPATIENT
Start: 2023-11-21 | End: 2023-11-21

## 2023-11-21 RX ORDER — SPIRONOLACTONE 25 MG/1
25 TABLET ORAL DAILY
Status: ON HOLD | COMMUNITY
End: 2023-11-28

## 2023-11-21 RX ORDER — HYDRALAZINE HYDROCHLORIDE 20 MG/ML
10-20 INJECTION INTRAMUSCULAR; INTRAVENOUS EVERY 4 HOURS PRN
Status: DISCONTINUED | OUTPATIENT
Start: 2023-11-21 | End: 2023-11-30 | Stop reason: HOSPADM

## 2023-11-21 RX ORDER — POLYETHYLENE GLYCOL 3350 17 G/17G
1 POWDER, FOR SOLUTION ORAL
Status: DISCONTINUED | OUTPATIENT
Start: 2023-11-21 | End: 2023-11-30 | Stop reason: HOSPADM

## 2023-11-21 RX ORDER — SODIUM BICARBONATE IN D5W 150/1000ML
PLASTIC BAG, INJECTION (ML) INTRAVENOUS CONTINUOUS
Status: DISCONTINUED | OUTPATIENT
Start: 2023-11-21 | End: 2023-11-22

## 2023-11-21 RX ORDER — LOSARTAN POTASSIUM 100 MG/1
100 TABLET ORAL DAILY
Status: ON HOLD | COMMUNITY
End: 2023-11-28

## 2023-11-21 RX ORDER — CALCIUM CHLORIDE 100 MG/ML
1 INJECTION INTRAVENOUS; INTRAVENTRICULAR ONCE
Status: COMPLETED | OUTPATIENT
Start: 2023-11-21 | End: 2023-11-21

## 2023-11-21 RX ORDER — AMLODIPINE BESYLATE 10 MG/1
10 TABLET ORAL DAILY
Status: ON HOLD | COMMUNITY
End: 2023-11-28

## 2023-11-21 RX ORDER — FUROSEMIDE 10 MG/ML
80 INJECTION INTRAMUSCULAR; INTRAVENOUS ONCE
Status: COMPLETED | OUTPATIENT
Start: 2023-11-21 | End: 2023-11-21

## 2023-11-21 RX ORDER — HYDRALAZINE HYDROCHLORIDE 10 MG/1
10 TABLET, FILM COATED ORAL EVERY 8 HOURS
Status: DISCONTINUED | OUTPATIENT
Start: 2023-11-21 | End: 2023-11-22

## 2023-11-21 RX ORDER — BISACODYL 10 MG
10 SUPPOSITORY, RECTAL RECTAL
Status: DISCONTINUED | OUTPATIENT
Start: 2023-11-21 | End: 2023-11-30 | Stop reason: HOSPADM

## 2023-11-21 RX ORDER — FUROSEMIDE 10 MG/ML
80 INJECTION INTRAMUSCULAR; INTRAVENOUS
Status: DISCONTINUED | OUTPATIENT
Start: 2023-11-21 | End: 2023-11-23

## 2023-11-21 RX ORDER — TEMAZEPAM 7.5 MG/1
7.5 CAPSULE ORAL 2 TIMES DAILY PRN
Status: DISCONTINUED | OUTPATIENT
Start: 2023-11-21 | End: 2023-11-30 | Stop reason: HOSPADM

## 2023-11-21 RX ORDER — ONDANSETRON 2 MG/ML
4 INJECTION INTRAMUSCULAR; INTRAVENOUS EVERY 4 HOURS PRN
Status: DISCONTINUED | OUTPATIENT
Start: 2023-11-21 | End: 2023-11-30 | Stop reason: HOSPADM

## 2023-11-21 RX ORDER — FUROSEMIDE 10 MG/ML
80 INJECTION INTRAMUSCULAR; INTRAVENOUS
Status: DISCONTINUED | OUTPATIENT
Start: 2023-11-21 | End: 2023-11-21

## 2023-11-21 RX ORDER — CALCIUM CHLORIDE 100 MG/ML
1 INJECTION INTRAVENOUS; INTRAVENTRICULAR ONCE
Status: COMPLETED | OUTPATIENT
Start: 2023-11-21 | End: 2023-11-20

## 2023-11-21 RX ORDER — AMOXICILLIN 250 MG
2 CAPSULE ORAL 2 TIMES DAILY
Status: DISCONTINUED | OUTPATIENT
Start: 2023-11-21 | End: 2023-11-30 | Stop reason: HOSPADM

## 2023-11-21 RX ADMIN — SODIUM BICARBONATE: 84 INJECTION, SOLUTION INTRAVENOUS at 03:11

## 2023-11-21 RX ADMIN — SENNOSIDES AND DOCUSATE SODIUM 2 TABLET: 50; 8.6 TABLET ORAL at 17:33

## 2023-11-21 RX ADMIN — HYDRALAZINE HYDROCHLORIDE 10 MG: 10 TABLET, FILM COATED ORAL at 13:55

## 2023-11-21 RX ADMIN — CALCIUM CHLORIDE 1 G: 100 INJECTION INTRAVENOUS; INTRAVENTRICULAR at 01:07

## 2023-11-21 RX ADMIN — HYDRALAZINE HYDROCHLORIDE 10 MG: 20 INJECTION, SOLUTION INTRAMUSCULAR; INTRAVENOUS at 11:05

## 2023-11-21 RX ADMIN — SODIUM ZIRCONIUM CYCLOSILICATE 10 G: 10 POWDER, FOR SUSPENSION ORAL at 01:30

## 2023-11-21 RX ADMIN — DEXTROSE MONOHYDRATE 25 G: 100 INJECTION, SOLUTION INTRAVENOUS at 01:16

## 2023-11-21 RX ADMIN — HEPARIN SODIUM 5000 UNITS: 5000 INJECTION, SOLUTION INTRAVENOUS; SUBCUTANEOUS at 05:06

## 2023-11-21 RX ADMIN — HEPARIN SODIUM 5000 UNITS: 5000 INJECTION, SOLUTION INTRAVENOUS; SUBCUTANEOUS at 17:34

## 2023-11-21 RX ADMIN — SODIUM BICARBONATE: 84 INJECTION, SOLUTION INTRAVENOUS at 02:39

## 2023-11-21 RX ADMIN — SODIUM BICARBONATE 50 MEQ: 84 INJECTION, SOLUTION INTRAVENOUS at 09:33

## 2023-11-21 RX ADMIN — INSULIN LISPRO 1 UNITS: 100 INJECTION, SOLUTION INTRAVENOUS; SUBCUTANEOUS at 17:38

## 2023-11-21 RX ADMIN — ONDANSETRON 4 MG: 2 INJECTION INTRAMUSCULAR; INTRAVENOUS at 12:55

## 2023-11-21 RX ADMIN — SODIUM ZIRCONIUM CYCLOSILICATE 10 G: 10 POWDER, FOR SUSPENSION ORAL at 12:05

## 2023-11-21 RX ADMIN — HYDRALAZINE HYDROCHLORIDE 10 MG: 10 TABLET, FILM COATED ORAL at 22:00

## 2023-11-21 RX ADMIN — ALBUTEROL SULFATE 10 MG: 2.5 SOLUTION RESPIRATORY (INHALATION) at 01:58

## 2023-11-21 RX ADMIN — FUROSEMIDE 80 MG: 10 INJECTION, SOLUTION INTRAVENOUS at 01:20

## 2023-11-21 RX ADMIN — SODIUM BICARBONATE: 84 INJECTION, SOLUTION INTRAVENOUS at 11:03

## 2023-11-21 RX ADMIN — INSULIN HUMAN 10 UNITS: 100 INJECTION, SOLUTION PARENTERAL at 01:49

## 2023-11-21 RX ADMIN — SODIUM BICARBONATE 100 MEQ: 84 INJECTION, SOLUTION INTRAVENOUS at 01:11

## 2023-11-21 RX ADMIN — FUROSEMIDE 80 MG: 10 INJECTION, SOLUTION INTRAMUSCULAR; INTRAVENOUS at 17:34

## 2023-11-21 RX ADMIN — SODIUM BICARBONATE: 84 INJECTION, SOLUTION INTRAVENOUS at 17:31

## 2023-11-21 ASSESSMENT — ENCOUNTER SYMPTOMS
DIARRHEA: 0
NERVOUS/ANXIOUS: 0
HEARTBURN: 0
SHORTNESS OF BREATH: 1
NAUSEA: 0
BACK PAIN: 0
CHILLS: 0
SHORTNESS OF BREATH: 0
HEADACHES: 0
BLURRED VISION: 0
HEMOPTYSIS: 0
SORE THROAT: 0
WEAKNESS: 0
INSOMNIA: 0
CONSTIPATION: 0
VOMITING: 0
DIZZINESS: 0
COUGH: 0
DOUBLE VISION: 0
FEVER: 0
TINGLING: 0
NAUSEA: 1
ABDOMINAL PAIN: 0
PALPITATIONS: 0

## 2023-11-21 ASSESSMENT — PAIN DESCRIPTION - PAIN TYPE
TYPE: ACUTE PAIN

## 2023-11-21 ASSESSMENT — FIBROSIS 4 INDEX: FIB4 SCORE: 3.19

## 2023-11-21 NOTE — ED TRIAGE NOTES
"Chief Complaint   Patient presents with    Shortness of Breath     Pt reporting SOB since yesterday. 88% on RA. Pt arrived to ER on 10L NRB. Pt bradycardic in the 40s on arrival     Pt BIB EMS for above. Pt received 1mg atropine en route. Denies cardiac hx. Pt Aox4, GCS15  /55   Pulse (!) 56   Temp 35.9 °C (96.7 °F) (Temporal)   Resp 18   Ht 1.778 m (5' 10\")   Wt 99.8 kg (220 lb)   SpO2 98%   BMI 31.57 kg/m²     "

## 2023-11-21 NOTE — CONSULTS
Hospital Medicine Consultation    Date of Service  11/21/2023    Referring Physician  Dr Minh Calabrese    Consulting Physician  Abdirahman Tristan M.D.    Reason for Consultation  Assume medical care    History of Presenting Illness  63 y.o. male who presented 11/20/2023 with history of hypertension cardiomyopathy EF 25-30% type 2 diabetes chronic kidney disease with prior biopsy revealing advanced diabetic glomerulosclerosis and history of noncompliance presented to the emergency department for evaluation of dyspnea and was noted to have worsening renal function and hyperkalemia K 8.0 .  He was bradycardic and received atropine and started on IV bicarb pushes and subsequent bicarb drip and Lokelma and diuretics and admitted to the intensive care unit.    The patient reports that he was getting low on his home medications and was stretching the doses so he does not run out.  He has not followed up with outpatient cardiology and nephrology due to transportation issues and concerns about cost.  The patient is adamant about not pursuing hemodialysis as he reports that his brother was on hemodialysis and he does not wish to his quality of life.    I reviewed hospitalization course and chemistry panel potassium 5.2 BUN 74 creatinine 7.7 bicarb 13      Review of Systems  Review of Systems   Constitutional:  Positive for malaise/fatigue.   Respiratory:  Positive for shortness of breath.    Cardiovascular:  Positive for leg swelling.   Gastrointestinal:  Negative for abdominal pain, nausea and vomiting.   All other systems reviewed and are negative.      Past Medical History   has a past medical history of Acute renal failure superimposed on chronic kidney disease (HCC) (11/21/2023), Anemia, Anemia due to chronic kidney disease (11/21/2023), Anticoagulation monitoring, special range, Blood clotting disorder ([facility]), Cardiomyopathy ([facility]), Chronic systolic congestive heart failure (HCC) (09/17/2020), Diabetes ([facility]), Former  smoker (11/21/2023), High cholesterol, Hyperkalemia (11/21/2023), Hypertension, LV (left ventricular) mural thrombus, Metabolic acidosis (11/21/2023), and Noncompliance (11/21/2023).    Surgical History   has a past surgical history that includes other orthopedic surgery.    Family History  Brother had end-stage renal disease requiring hemodialysis  Social History   reports that he quit smoking about 26 years ago. His smoking use included cigarettes. He started smoking about 46 years ago. He has a 20.0 pack-year smoking history. He has never used smokeless tobacco. He reports current alcohol use. He reports that he does not currently use drugs after having used the following drugs: Marijuana.    Medications  Current Facility-Administered Medications   Medication Dose Route Frequency Provider Last Rate Last Admin    sodium bicarbonate 150 mEq in D5W infusion (premix)   Intravenous Continuous Lakisha Stewart, A.P.R.N. 200 mL/hr at 11/21/23 1103 New Bag at 11/21/23 1103    heparin injection 5,000 Units  5,000 Units Subcutaneous Q12HRS Jacek Herron M.D.   5,000 Units at 11/21/23 0506    furosemide (Lasix) injection 80 mg  80 mg Intramuscular BID DIURETIC Jacek Herron M.D.        sodium zirconium cyclosilicate (Lokelma) packet 10 g  10 g Oral DAILY AT NOON Miguel Hernandez M.D.   10 g at 11/21/23 1205    hydrALAZINE (Apresoline) injection 10-20 mg  10-20 mg Intravenous Q4HRS PRN Lakisha Stewart, A.P.R.N.   10 mg at 11/21/23 1105    senna-docusate (Pericolace Or Senokot S) 8.6-50 MG per tablet 2 Tablet  2 Tablet Oral BID Lakisha Stewart, A.P.R.N.        And    polyethylene glycol/lytes (Miralax) PACKET 1 Packet  1 Packet Oral QDAY PRN Lakisha Stewart, A.P.R.N.        And    bisacodyl (Dulcolax) suppository 10 mg  10 mg Rectal QDAY PRN Lakisha Stewart, A.P.R.N.        insulin lispro (HumaLOG,AdmeLOG) injection  1-6 Units Subcutaneous 4X/DAY ACHS Gautam Irene Jr., D.O.        And    dextrose 10 % BOLUS 25 g  25 g  Intravenous Q15 MIN PRN Gautam Irene Jr., D.O.        ondansetron (Zofran) syringe/vial injection 4 mg  4 mg Intravenous Q4HRS PRN Lakisha Stewart A.P.R.N.   4 mg at 11/21/23 1255       Allergies  No Known Allergies    Physical Exam  Temp:  [35.9 °C (96.7 °F)-36.2 °C (97.2 °F)] 36.1 °C (97 °F)  Pulse:  [34-63] 63  Resp:  [18-43] 27  BP: ()/(52-72) 157/70  SpO2:  [93 %-98 %] 97 %    Physical Exam  Vitals and nursing note reviewed.   Constitutional:       Appearance: He is well-developed. He is not diaphoretic.   HENT:      Head: Normocephalic and atraumatic.      Mouth/Throat:      Pharynx: No oropharyngeal exudate.   Eyes:      General: No scleral icterus.        Right eye: No discharge.         Left eye: No discharge.      Conjunctiva/sclera: Conjunctivae normal.      Pupils: Pupils are equal, round, and reactive to light.   Neck:      Vascular: No JVD.      Trachea: No tracheal deviation.   Cardiovascular:      Rate and Rhythm: Normal rate and regular rhythm.      Heart sounds: No murmur heard.     No friction rub. No gallop.   Pulmonary:      Effort: Pulmonary effort is normal. No respiratory distress.      Breath sounds: No stridor. Rales present. No wheezing.   Chest:      Chest wall: No tenderness.   Abdominal:      General: Bowel sounds are normal. There is no distension.      Palpations: Abdomen is soft.      Tenderness: There is no abdominal tenderness. There is no rebound.   Musculoskeletal:         General: Swelling present. No tenderness.      Cervical back: Neck supple.   Skin:     General: Skin is warm and dry.      Nails: There is no clubbing.   Neurological:      Mental Status: He is alert and oriented to person, place, and time.      Cranial Nerves: No cranial nerve deficit.      Motor: No abnormal muscle tone.   Psychiatric:         Behavior: Behavior normal.         Fluids  Date 11/21/23 0700 - 11/22/23 0659   Shift 6042-8491 4179-6524 7614-1917 24 Hour Total   INTAKE   P.O. 220   220    I.V. 921.7   921.7   Other 120   120   Shift Total 1261.7   1261.7   OUTPUT   Urine 1800   1800   Emesis 250   250   Shift Total 2050 2050   Weight (kg) 106.3 106.3 106.3 106.3       Laboratory  Recent Labs     11/20/23  2323   WBC 7.4   RBC 2.77*   HEMOGLOBIN 8.3*   HEMATOCRIT 25.3*   MCV 91.3   MCH 30.0   MCHC 32.8   RDW 50.8*   PLATELETCT 245   MPV 10.9     Recent Labs     11/21/23  0325 11/21/23  0630 11/21/23  0830   SODIUM 134* 131* 131*   POTASSIUM 6.0* 6.0* 5.8*   CHLORIDE 111 106 106   CO2 10* 9* 10*   GLUCOSE 53* 139* 145*   BUN 80* 78* 76*   CREATININE 8.12* 8.07* 7.92*   CALCIUM 9.0 8.5 8.4*                     Imaging  DX-CHEST-PORTABLE (1 VIEW)   Final Result      1.  Marked enlargement of cardiac silhouette      2.  Bilateral atelectasis      3.  Probable small right pleural effusion      4.  Hyperinflation consistent with chronic obstructive pulmonary disease          Assessment/Plan  * Hyperkalemia- (present on admission)  Assessment & Plan  Secondary to progression of end-stage renal disease  Continue bicarb drip and Lokelma  Discussed with the patient that he will likely progress to end-stage renal disease and that he will likely need hemodialysis  Nephrology consulted will await their recommendations    Advance care planning  Assessment & Plan  I had a long discussion with the patient about his medical condition treatment options and goals of care.  I discussed that he is likely progressed to the point where he will need hemodialysis given his severe hyperkalemia on presentation at this time he declines hemodialysis but would like to continue with current level of care    Total time spent discussing goals of care 20 minutes    Noncompliance  Assessment & Plan  Patient has not followed up with cardiology or nephrology as outpatient and was stretching his outpatient medications  Counseled on compliance with medical therapy        Hyponatremia  Assessment & Plan  Sodium 133 improved continue to  monitor electrolytes    Metabolic acidosis  Assessment & Plan  Secondary to renal failure  Continue bicarb drip and monitor chemistry panel    Anemia due to chronic kidney disease  Assessment & Plan  No clinical signs of bleeding  Hemoglobin 8.3 I ordered repeat CBC    Hypertension- (present on admission)  Assessment & Plan  Start hydralazine and monitor blood pressure    Acute renal failure superimposed on chronic kidney disease (HCC)  Assessment & Plan  Likely progressed to end-stage renal disease    Chronic systolic congestive heart failure (HCC)- (present on admission)  Assessment & Plan  Given hyperkalemia losartan and spironolactone were discontinued  Given bradycardia carvedilol was discontinued we will consider resuming beta-blocker if heart rate remains stable  Continue IV Lasix    Type 2 diabetes mellitus (HCC)- (present on admission)  Assessment & Plan  Continue insulin sliding scale and monitor CBGs  Check hemoglobin A1c

## 2023-11-21 NOTE — CONSULTS
"[facility] -  INITIAL CONSULT NOTE               Author: Dionna Nielsen M.D. Date & Time: 11/21/2023  10:17 AM       REASON FOR CONSULTATION:   Hyperkalemia    CHIEF COMPLAINT:   Shortness of breath, cough    HISTORY OF PRESENT ILLNESS:    Mr. 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 He reports having \"cold like symptoms\" with productive cough and SOB that he describes as inability to take a full breath worse on exertion and lying down flat. He denies chest pain, palpitations, headaches, profuse sweating, fevers or chills, etc.     He reports being late to follow up with his \"regular doctor\" -- unclear if he means PCP or a specialist. Hence for the past couple of weeks, he'd been halving his home meds to make them last till the follow up appointment. He reportedly got refills and restarted taking full doses of his meds on Saturday or Sunday which is around the time his sxs began.     He can't remember the names of all his home meds but brought them with him and per pharm med reconciliation these include -- Spironolactone 25mg once daily, Losartan 100mg once daily, Amlodipine 10mg once daily, Atorvastatin 40mg once daily, and Carvedilol 25mg BID.     ED/ICU course so far:  -He was reportedly found to be bradycardic, given 1mg atropine by EMS with mild improvement in HR.   -EKG showed irregular rhythm with absent p waves, concerning for slow Afib vs irregular junctional rhythm, however there was no evidence of heart block and he was otherwise hemodynamically stable.   -Concern for hyperkalemia, labs confirmed K of 8. He declined HD due to personal reasons, hence was " medically treated for hyperkalemia. So far, he has received Ca Chloride 1g x 2 doses, Insulin IV 10U with D50 x 1 dose, Na bicarb 100mEq x 1 dose, lasix 80mg x 1, lokelma 10g x 1, and albuterol neb.   -He is currently on Na bicarb infusion at 200ml/hour and lokelma 10g once daily.   -This morning, he reports having significant improvement in his SOB overnight. Cough is still persistent. He has been on 2L O2 via NC saturating around 97%, not on baseline o2 at home.   -Latest labs from 8:30AM show K of 5.8 and Scr of 8.07 (similar to last known Scr from 2021).     Other pertinent labs show:  -Latest RFT: SCr 7.92, BUN 76, and EGFR 7.   -ABG: pH 7.182, pCO2 23.5, HCO3 8.8, and PO2 82. Delta gap is -11.2. Anion gap of 16.   This is consistent with HAGMA with appropriately compensated respiratory alkalosis combined with a component of NAGMA (delta ratio <-6).   -Trop remains steady at 56 to 55.   -BNP elevated at [address]52.     REVIEW OF SYSTEMS:      No F/C/N/V/D.  No melena, hematochezia, hematemesis.  No HA, visual changes, or abdominal pain.    REVIEW OF SYSTEMS:    10 point ROS was performed and is as per HPI or otherwise negative.    PAST MEDICAL HISTORY:   Past Medical History:   Diagnosis Date    Anemia     Anticoagulation monitoring, special range     Blood clotting disorder (HCC)     heart    Cardiomyopathy (HCC)     Chronic systolic congestive heart failure (HCC) 09/17/2020    Diabetes (HCC)     no longer on medications per MD, 6/2021    Former smoker 11/21/2023    High cholesterol     Hypertension     LV (left ventricular) mural thrombus     on warfarin, no longer on coumadin months ago       PAST SURGICAL HISTORY:   Past Surgical History:   Procedure Laterality Date    OTHER ORTHOPEDIC SURGERY      knee, 40 y ago       FAMILY HISTORY:   History reviewed. No pertinent family history.    SOCIAL HISTORY:   Social History     Tobacco Use   Smoking Status Former    Current packs/day: 0.00    Average packs/day: 1  "pack/day for 20.0 years (20.0 ttl pk-yrs)    Types: Cigarettes    Start date:     Quit date:     Years since quittin.9   Smokeless Tobacco Never     Social History     Substance and Sexual Activity   Alcohol Use Yes    Comment: 4-6 drinks a week      Social History     Substance and Sexual Activity   Drug Use Not Currently    Types: Marijuana    Comment: smokes marijuana daily, last night at 1900       HOME MEDICATIONS:   Reviewed and documented in chart.    LABORATORY STUDIES:   Recent Labs     23  0325 23  0630 23  0830   SODIUM 134* 131* 131*   POTASSIUM 6.0* 6.0* 5.8*   CHLORIDE 111 106 106   CO2 10* 9* 10*   GLUCOSE 53* 139* 145*   BUN 80* 78* 76*   CREATININE 8.12* 8.07* 7.92*   CALCIUM 9.0 8.5 8.4*       ALLERGIES:  Patient has no known allergies.    VS:  BP (!) 158/70   Pulse (!) 59   Temp 36.2 °C (97.2 °F) (Temporal)   Resp (!) 27   Ht 1.778 m (5' 10\")   Wt 106 kg (234 lb 5.6 oz)   SpO2 95%   BMI 33.63 kg/m²     Physical Exam  Vitals and nursing note reviewed.   Constitutional:       General: He is not in acute distress.     Appearance: Normal appearance. He is obese. He is not ill-appearing, toxic-appearing or diaphoretic.   HENT:      Head: Normocephalic and atraumatic.      Mouth/Throat:      Mouth: Mucous membranes are moist.      Pharynx: Oropharynx is clear.   Eyes:      General: No scleral icterus.        Right eye: No discharge.         Left eye: No discharge.      Extraocular Movements: Extraocular movements intact.      Conjunctiva/sclera: Conjunctivae normal.      Pupils: Pupils are equal, round, and reactive to light.   Cardiovascular:      Rate and Rhythm: Regular rhythm. Bradycardia present.      Pulses: Normal pulses.      Heart sounds: Normal heart sounds. No murmur heard.     No friction rub. No gallop.      Comments: No JVD or hepatojugular reflex  Pulmonary:      Effort: Pulmonary effort is normal. No respiratory distress.      Breath sounds: No " wheezing, rhonchi or rales.      Comments: Breath sounds somewhat reduced over the right lower lung field compared to left.   No adventitious sounds heard on auscultation.   Chest:      Chest wall: No tenderness.   Abdominal:      General: Abdomen is flat. Bowel sounds are normal. There is no distension.      Palpations: Abdomen is soft. There is no mass.      Tenderness: There is no abdominal tenderness. There is no guarding or rebound.   Musculoskeletal:      Cervical back: Normal range of motion and neck supple.      Right lower leg: Edema (1+ till the ankles) present.      Left lower leg: Edema (1+ till the ankles) present.      Comments: B/L 1+ pitting pedal edema noted till the ankles, symmetric bilaterally.    Skin:     General: Skin is warm.      Capillary Refill: Capillary refill takes less than 2 seconds.      Coloration: Skin is not jaundiced.   Neurological:      General: No focal deficit present.      Mental Status: He is alert and oriented to person, place, and time. Mental status is at baseline.      Cranial Nerves: No cranial nerve deficit.   Psychiatric:         Mood and Affect: Mood normal.         Behavior: Behavior normal.         Thought Content: Thought content normal.         Judgment: Judgment normal.          FLUID BALANCE:  In: 875.4 [P.O.:240; I.V.:635.4]  Out: 200     IMAGING:  Reviewed results of the CXR. On my personal review, the cardiac silhouette appears slightly enlarged with blunting of b/l costophrenic angles, worse on the left side.     IMPRESSION:    # Hyperkalemia --- Improving  - Likely secondary to underlying progressive CKD maverick in the setting of recent med dosage changes that pt reported (restarted full doses of Spironolactone and Losartan)  - K of 8 at admission, now down to 5.8 on most recent labs.  - Declined HD at admission, hence being medically managed. So far has received IV Ca chloride 2g, albuterol neb, IV lasix 80 x 1, sodium bicarb 100mEq and then continuous  infusion, and lokelma 10g x 1.     # Primary high anion gap metabolic acidosis with combined NAGMA  - Appropriately compensated respiratory alkalosis. Underlying NAGMA could be due to RTA type 4 (Has DM nephropathy, on ARB, etc).   - On Sodium bicarb infusion at this time    # Sinus bradycardia  - Stable at this time, asymptomatic  - Could be due to hyperkalemia, which is improving  - Defer tx to primary ICU team    # CKD stage 5  # Type 2 diabetes mellitus with diabetic nephropathy  - SCr 8.07 this admission, similar to last known baseline SCr from 2021  - Renal biopsy in 08/2021 showed advanced diabetic glomerulosclerosis class IV  - Counseled at length about consideration of HD, Peritoneal dialysis is a potential option as well. However, pt is adamantly refusing at this time. Explained the risks of progressive renal disease to ESRD without treatment leading to death. Pt expressed understanding of this.     # Hypertension  - Goal BP < 140/90  - Can restart Amlodipine 10mg once daily    # Anemia of CKD  - Goal Hgb 10-11, currently not at goal    PLAN:  - K levels are improving and he is hemodynamically stable.   - Strict urine output measurement  - Recommend stopping the IV sodium bicarb infusion and start on PO Sodium bicarb 1300mg TID  - Continue Lokelma 10g once daily, can be increased to TID dosing x 48 hours if K starts to uptrend followed by once daily dosing after 48 hours.   - Can check repeat labs tomorrow  - Hold ACEi/ARBs and potassium sparing diuretics   - Low potassium diet/ renal diet  - Renally dose all meds    Thank you for the consultation!    Thank you for the consult.     Dr. Dionna Nielsen MD   [facility] IM PGY 2 Resident    Please refer to Dr. Zuleta's attestation for additional comments/recommendations.

## 2023-11-21 NOTE — ED NOTES
Med rec complete per patient  Allergies reviewed.   Patient denies any outpatient antibiotics in the last 30 days.   Anticoagulants taken in the last 14 days? No    Patients home pharmacy: [facility]    Britni Jones CPhT

## 2023-11-21 NOTE — PROGRESS NOTES
Home meds sent down to pharmacy with UC in a patient labeled belongings bag with the appropriate seal.

## 2023-11-21 NOTE — ASSESSMENT & PLAN NOTE
Reportedly CKD 4, difficult to ascertain baseline  Renal biopsy in 2021 demonstrated advanced diabetic glomerulosclerosis  Limit nephrotoxins  Continue to monitor kidney function with BMPs

## 2023-11-21 NOTE — DISCHARGE PLANNING
Medical Social Work    Referral: Acute Medical Patient    Intervention: Pt is a 63 year old male brought in by [facility] from his home in [place] for SOB.  Pt is Donte Horta (: 1960).  Pt's emergency contact is his significant other, Amna ([phone]) who should be coming in tomorrow as she had to stay home with a child.      Plan: SW will follow as needed.

## 2023-11-21 NOTE — PROGRESS NOTES
"Critical Care Consult    Date of Admission: 23    Chief Complaint:  Chief Complaint   Patient presents with    Shortness of Breath     Pt reporting SOB since yesterday. 88% on RA. Pt arrived to ER on 10L NRB. Pt bradycardic in the 40s on arrival     HPI:   From Dr. Hernandez's note: \" 64 yo male with H/o CKD, HTN, DM, cardiomyopathy with EF 25-30% and prior LV thrombus s/p anticoagulation presents with SOB and found to have HCO3 8, creatinine 8.46 and K 8.0.  Pateint is clear about DNAR and no HD.  Mother apparently  of ESRD and was on HD.  Nephrology consulted and pharmacologic Rx for hyperkalemia initiated in the ER.  Patient states he still makes some urine daily.  He is mildly hypoxic requiring low flow nasal cannula O2 at 2-3 L/min.  He has remained normotensive despite bradycardia.  He has responded to atropine from paramedics with heart rate up into the 60s.  Heart rate currently in the 50s and blood pressure in the 130s.  CXR with cardiomegaly, basilar atelectasis, small effusion on the right and old film with perhaps some early hyperinflation.  Prior echo 2021 reviewed. \"      24h events: On 2-3 lpm oxygen, approximately 700 cc of urine after Lasix.  On a bicarb drip.  Alert and oriented x 4.  States he used to have a cardiology physician as well as a nephrologist but thought they were \"a waste of money\".  He does confirm that he does not want to have dialysis.  Tubes, Lines, Drains: PIVs  Drips: Bicarb drip at 125/hr --> 200/hr   Nutrition: Renal diet   Notable lab trends: K 8 to 6. Serum bicarb 10. Creat 8.46 to 8.12. BNP 57019.   AB.  CXR: reviewed   Tmax: afebrile  ProCal: n/a  Antibiotics: n/a  Steroids: n/a  Cultures: n/a  I/O: 200 UOP  PPX: SQ heparn   BM regimen: MiraLAX, senna-docusate, milk magnesia, bisacodyl  Last BM: prior to admit     Scheduled Medications   Medication Dose Frequency    heparin  5,000 Units Q12HRS    furosemide  80 mg BID DIURETIC    sodium zirconium " "cyclosilicate  10 g DAILY AT NOON    insulin lispro  1-6 Units Q6HRS       Allergies: Patient has no known allergies.      ROS: A 12 point ROS was performed on intake and during my interview. ROS negative unless specifically noted in HPI.     Vitals:  BP (!) 147/70   Pulse (!) 58   Temp 36 °C (96.8 °F) (Temporal)   Resp (!) 24   Ht 1.778 m (5' 10\")   Wt 106 kg (234 lb 5.6 oz)   SpO2 97%     Physical Exam:  Physical Exam  Vitals reviewed.   Constitutional:       General: He is not in acute distress.     Appearance: Normal appearance. He is obese. He is not ill-appearing, toxic-appearing or diaphoretic.   HENT:      Mouth/Throat:      Pharynx: Oropharynx is clear.   Eyes:      General: No scleral icterus.        Right eye: No discharge.         Left eye: No discharge.      Conjunctiva/sclera: Conjunctivae normal.   Cardiovascular:      Rate and Rhythm: Normal rate and regular rhythm.      Pulses: Normal pulses.   Pulmonary:      Effort: Pulmonary effort is normal.      Breath sounds: Normal breath sounds.   Musculoskeletal:      Right lower leg: Edema present.      Left lower leg: Edema present.      Comments: Scant edema   Skin:     General: Skin is warm.      Capillary Refill: Capillary refill takes less than 2 seconds.      Coloration: Skin is not jaundiced.   Neurological:      General: No focal deficit present.      Mental Status: He is alert and oriented to person, place, and time.   Psychiatric:         Behavior: Behavior normal.         Laboratory Data: I personally reviewed labs including historical lab trends.     Immunization History   Administered Date(s) Administered    Influenza Vaccine H1N1 - HISTORICAL DATA 11/13/2009    Influenza Vaccine Pediatric Split - Historical Data 10/17/2008, 11/13/2009, 09/12/2016    TD Vaccine 08/06/2001    Tdap Vaccine 09/12/2016       PFTs as reviewed by me personally show:    Imaging as reviewed by me personally show:  Cardiomegaly, chronic right effusion, fluid " "overload       Assessment/Plan:    ICU Problem list:  #Acute hypoxic respiratory failure secondary to pulmonary edema 2/2 renal disease   #Hyperkalemia - 2/2 LEANNA on CKD  #LEANNA on CKD - Biopsy 8/11/2021 c/w advanced diabetic glomerularsclerosis   #Metabolic acidosis - 2/2 CKD   #Bradycardia (resolved) - likely 2/2 hyperkalemia  #Cardiomyopathy and CHF -H/o EF 25-30%, 2021 ECHO EF 40%   #DM  #HTN  #Chronic normocytic anemia - likely 2/2 CKD  #Tobacco abuse - in remission  #Vaccine counseling   #Chronic atelectasis secondary to obesity    #Acute hypoxic respiratory failure secondary to pulmonary edema 2/2 renal disease   #Cardiomyopathy and CHF -H/o EF 25-30%, 2021 ECHO EF 40%   #Chronic atelectasis secondary to obesity  -responding well to diuresis  -target O2 saturation 88-91%  -encourage OOBTC, walking with PT, IS  -encouraged to be more compliant with outpatient medical care. He stated he has had some transportation issues and has missed a lot of appointments with his PCM and dropped his nephrologist and cardiologist because he felt that they were \"a waste of money\".     #Hyperkalemia - 2/2 LEANNA on CKD  #Metabolic acidosis   -improving  -consider diuresis   -receiving Lokelma  -monitor  -increased bicarb drip to 200/hr and gave 1 amp push     #LEANNA on CKD - Biopsy 8/11/2021 c/w advanced diabetic glomerularsclerosis   #Chronic normocytic anemia - likely 2/2 CKD  -declines dialysis  -likely needs outpatient palliative care/hospice  -avoid nephrotoxins   -also a possible superimposed cardiorenal component     #DM  -SSI  -target -180    #HTN  -monitor  -holding home BB  -target -140, MAP >60    #Tobacco abuse - in remission    Dispo: Downgrade to med-tele. Discussed with Dr. Sutherland.     Total consult time: 55 minutes which included time spent on chart review, personally reviewing pertinent images and labs, time spent counseling and educating the patient and/or family members, and coordinating care with " the healthcare team to include consultants.       __________  Minh Petit, DO  Pulmonary and Critical Care Medicine  [facility]    Please note that this dictation was created using voice recognition software. The accuracy of the dictation is limited to the abilities of the software. I have made every reasonable attempt to correct obvious errors, but I expect that there are errors of grammar and possibly content that I did not discover before finalizing the note.

## 2023-11-21 NOTE — ASSESSMENT & PLAN NOTE
Patient does not wish to pursue dialysis even after extensive risks and benefits discussion.  Patient aware of high risk of mortality and morbidity however the patient like to hold off on dialysis  BMP every 2 hours per nephrology recommendations  Monitor on telemetry  Pursue medical therapy with albuterol therapy, insulin and D50, calcium chloride, bicarbonate drip, IV Lasix, Lokelma

## 2023-11-21 NOTE — CONSULTS
Inpatient Palliative Medicine Evaluation     Donte Horta  63 y.o. male  [id number]    Location: [facility]  PCP:Anmol Mccray M.D.  Referral Source: Abdirahman Tristan M.d.    Reason for palliative medicine consultation and/or visit: ACP         Assessment and Plan:     GOAL(S) OF CARE = LONGEVITY, pending COMFORT transition when discharged home with home hospice following    SYMPTOMS ETIOLOGY/CAUSES = nausea & vomit, fatigue secondary to ESRD & CHF & associated metabolic derangement.    PROGNOSIS = weeks-to-months  - YES hospice-eligible = ESRD creatnine 7-8's and declining dialysis, with HFrEF and associated volume overload.  - YES hospice-appropriate =     CODE STATUS = DNAR DNI      ADVANCE CARE PLANNING =   Relevant history reviewed.  Medical updates  Hospice philosophy introduction/education    PALLIATIVE CARE TEAM INTERVENTIONS =   ACP  Hospice-referral placed.  Confirmed patient's ultimate GOC at this time = medical optimization and then discharge home with home hospice following        Summary:   Donte Horta is a 63 y.o. with ESRD and HFrEF EF=25-30%, and history of LV thrombus sp anticoagulation who presented to ED 11/20/2023 with worsening SOB. Patient was also found bradycardic and urgently treated with atropine. He has been declinig dialysis so far. Palliative care is consulted for GOC clarification.   -----------------------------------------------------------------------------------------------------------------  [facility] Erin Wallace and myself met patient bedside, together with his daughter Lala Pina present. Introduced palliative care service and purpose of this consultation. Patient was amenable to proceed with this interview.    He endorsed some emesis earlier today which has improved somewhat, and he was not actively heaving. Patient denied active pain nor dyspnea nor significant fatigue, though he noted he was dozing off more frequently.  Patient did request something to  "help him sleep better at night, and he was agreeable to try PRN temazepam. Order placed.    Patient adamantly declines dialysis. His mother and a brother both unfortunately had to use dialysis in the final phase of their lives, and patient witnessed much burden and suffering from in their final days. \"Dialysis helped my mom live longer, but she was always sick and going in and out of hospital all the time...  I don't want that, not for my kids to see either,\" he told us.    Patient has lived with CHF for years, and his kidney function unfortunately declined last 3-4 years in part contributed by the many heart medications he has had to use over the years.  He has been following with nephrology since 2021, yet has always maintained his own inclination against dialysis.    Patient lives with his wife Leydi at home, who is his main caregiver.Lala lives in [place] with her own family.     We talked about hospice care after medical optimization & discharge home, and patient was agreeable. Patient is familiar with hospice care, as his mother-in-law passed away under hospice care last year. He has no preferred hospice agency at this time. He is amenable to the concept of living his life naturally, and dying naturally.    Hospice referral placed, for discharge after medical optimization. No further unaddressed concerns nor complaints at this time.        Advance care planning:  The patient and/or legal decision maker has provided voluntary consent to discuss advance care planning. We discussed code status.  DNAR DNI    Total time spent in ACP discussion 30 minutes, which is separate from the time spent completing the evaluation and management visit.     Thank you for allowing me the opportunity to participate in the care of Donte Horta    I spent a total of 45 minutes reviewing medical records, direct face-to-face time with the patient and/or family, documentation and coordination of care. This is separate from the " time spent on advance care planning, which is documented above.               MARISOL (CHELSEA) DO [facility]   [address] [place]  [id number]  P: [phone]  F: [phone]  C: [phone]

## 2023-11-21 NOTE — CONSULTS
[facility] ICU Consult Note      Admit Date: 11/20/2023    Resident(s): Jacek Herron M.D.   Attending:  STEFANIA GARNICA/ Dr. Hernandez    Patient ID:    Name:  Donte Horta   YOB: 1960  Age:  63 y.o.  male   MRN:  [id number]    Hospital Course (carried forward and updated):  Donte Horta is a 63 y.o. male with past medical history significant for chronic kidney disease who presented to the emergency department after a 2-day history of shortness of breath and palpitations.    Patient is unsure if this happened before to him. Patient also reports associated symptoms of occasional self-reported fevers and chills however the patient did not measure his temperature. When asking the patient to clarify his answers, patient would often be unable to explain further. Patient is unsure which medications he takes but reports adherence.  Patient informs that he has seen a nephrologist in the past however elected not to follow-up as he believes he received repetitive information.    Patient reports that his shortness of breath and palpitations have since resolved after being in the emergency department.    En route to the ED, the patient 1 mg of atropine and relative bradycardia.    In the ED, initial vitals of blood pressure 102/55, pulse of 56, temperature 96.7 °F, respiratory rate of 20, 98% on supplemental oxygen.  Labs are significant for hemoglobin of 8.3, sodium of 129, potassium of 8, bicarbonate of 8, creatinine of 8.46 with BUN of 29, elevation AST/ALT signs, phosphorus of 6.1, troponin of 36, [place]NP of 49[address].  Patient also received 80 mg of IV Lasix, insulin and D50, sodium bicarbonate, Lokelma, calcium chloride.    Consultants:  Critical Care  Nephrology     Vitals Range last 24h:  Temp:  [35.9 °C (96.7 °F)-36 °C (96.8 °F)] 36 °C (96.8 °F)  Pulse:  [34-57] 43  Resp:  [18-32] 24  BP: ()/(52-61) 134/61  SpO2:  [93 %-98 %] 97 %      Intake/Output Summary (Last 24 hours) at 11/21/2023 5142  Last  "data filed at 11/21/2023 0300  Gross per 24 hour   Intake 284.91 ml   Output --   Net 284.91 ml        Review of Systems   Constitutional:  Negative for chills, fever and malaise/fatigue.   HENT:  Negative for hearing loss and sore throat.    Eyes:  Negative for blurred vision and double vision.   Respiratory:  Negative for cough, hemoptysis and shortness of breath.    Cardiovascular:  Negative for chest pain, palpitations and leg swelling.   Gastrointestinal:  Positive for nausea. Negative for abdominal pain, constipation, diarrhea, heartburn and vomiting.   Genitourinary:  Negative for dysuria and hematuria.   Musculoskeletal:  Positive for joint pain. Negative for back pain.   Neurological:  Negative for dizziness, tingling, weakness and headaches.   Psychiatric/Behavioral:  The patient is not nervous/anxious and does not have insomnia.        PHYSICAL EXAM:  Vitals:    11/21/23 0200 11/21/23 0201 11/21/23 0218 11/21/23 0300   BP: 123/58  131/60 134/61   Pulse: (!) 50 (!) 50 (!) 53 (!) 43   Resp: (!) 24 (!) 32  (!) 24   Temp:    36 °C (96.8 °F)   TempSrc:    Temporal   SpO2: 95% 95% 98% 97%   Weight:    106 kg (234 lb 5.6 oz)   Height:    1.778 m (5' 10\")    Body mass index is 33.63 kg/m².    O2 therapy: Pulse Oximetry: 97 %, O2 (LPM): 3, O2 Delivery Device: Nasal Cannula      Physical Exam  Vitals and nursing note reviewed.   HENT:      Head: Normocephalic and atraumatic.      Right Ear: External ear normal.      Left Ear: External ear normal.      Nose: No rhinorrhea.      Mouth/Throat:      Mouth: Mucous membranes are moist.   Eyes:      Extraocular Movements: Extraocular movements intact.      Conjunctiva/sclera: Conjunctivae normal.   Cardiovascular:      Rate and Rhythm: Regular rhythm. Tachycardia present.      Heart sounds: No murmur heard.     No gallop.   Pulmonary:      Effort: Pulmonary effort is normal.      Breath sounds: Normal breath sounds. No wheezing or rhonchi.   Abdominal:      General: Bowel " sounds are normal. There is no distension.      Tenderness: There is no abdominal tenderness.   Musculoskeletal:         General: No swelling.      Cervical back: Normal range of motion and neck supple.      Right lower leg: Edema present.      Left lower leg: Edema present.      Comments: Bilateral pitting edema up to mid shins   Skin:     General: Skin is warm and dry.      Capillary Refill: Capillary refill takes 2 to 3 seconds.      Coloration: Skin is not jaundiced.   Neurological:      Mental Status: He is alert and oriented to person, place, and time.   Psychiatric:         Mood and Affect: Mood normal.         Behavior: Behavior normal.         Past Medical History:   Diagnosis Date    Anemia     Anticoagulation monitoring, special range     Blood clotting disorder (HCC)     heart    Cardiomyopathy (HCC)     Diabetes (HCC)     no longer on medications per MD, 6/2021    High cholesterol     Hypertension     LV (left ventricular) mural thrombus     on warfarin, no longer on coumadin months ago     Past Surgical History:   Procedure Laterality Date    OTHER ORTHOPEDIC SURGERY      knee, 40 y ago     No current facility-administered medications on file prior to encounter.     Current Outpatient Medications on File Prior to Encounter   Medication Sig Dispense Refill    spironolactone (ALDACTONE) 25 MG Tab Take 25 mg by mouth every day.      losartan (COZAAR) 100 MG Tab Take 100 mg by mouth every day.      amLODIPine (NORVASC) 10 MG Tab Take 10 mg by mouth every day.      atorvastatin (LIPITOR) 40 MG Tab Take 40 mg by mouth every evening.      carvedilol (COREG) 25 MG Tab Take 1 tablet by mouth 2 times a day with meals. 90 tablet 3     No Known Allergies    Social History     Socioeconomic History    Marital status: Single     Spouse name: Not on file    Number of children: Not on file    Years of education: Not on file    Highest education level: Not on file   Occupational History    Not on file   Tobacco Use     Smoking status: Former     Current packs/day: 0.00     Average packs/day: 1 pack/day for 20.0 years (20.0 ttl pk-yrs)     Types: Cigarettes     Start date:      Quit date:      Years since quittin.9    Smokeless tobacco: Never   Vaping Use    Vaping Use: Never used   Substance and Sexual Activity    Alcohol use: Yes     Comment: 4-6 drinks a week     Drug use: Not Currently     Types: Marijuana     Comment: smokes marijuana daily, last night at 1900    Sexual activity: Not on file   Other Topics Concern    Not on file   Social History Narrative    Not on file     Social Determinants of Health     Financial Resource Strain: Not on file   Food Insecurity: Not on file   Transportation Needs: Not on file   Physical Activity: Not on file   Stress: Not on file   Social Connections: Not on file   Intimate Partner Violence: Not on file   Housing Stability: Not on file     Family History:  -Mom had ESRD was on dialysis        Recent Labs     23  0325   SODIUM 129* 134*   POTASSIUM 8.0* 6.0*   CHLORIDE 109 111   CO2 8* 10*   BUN 79* 80*   CREATININE 8.46* 8.12*   MAGNESIUM 1.8  --    PHOSPHORUS 6.1*  --    CALCIUM 7.5* 9.0     Recent Labs     23  0325   ALTSGPT 148*  --    ASTSGOT 151*  --    ALKPHOSPHAT 123*  --    TBILIRUBIN 0.3  --    GLUCOSE 163* 53*     Recent Labs     23   RBC 2.77*   HEMOGLOBIN 8.3*   HEMATOCRIT 25.3*   PLATELETCT 245     Recent Labs     23   WBC 7.4   NEUTSPOLYS 72.30*   LYMPHOCYTES 15.70*   MONOCYTES 9.20   EOSINOPHILS 1.60   BASOPHILS 0.10   ASTSGOT 151*   ALTSGPT 148*   ALKPHOSPHAT 123*   TBILIRUBIN 0.3       Meds:   sodium bicarbonate 150 meq in D5W 1000 mL   125 mL/hr at 23 0311    albuterol  10 mg      heparin  5,000 Units      furosemide  80 mg      sodium zirconium cyclosilicate  10 g          Procedures:  N/a    Imaging:  DX-CHEST-PORTABLE (1 VIEW)   Final Result      1.  Marked enlargement of cardiac  silhouette      2.  Bilateral atelectasis      3.  Probable small right pleural effusion      4.  Hyperinflation consistent with chronic obstructive pulmonary disease          ASSESSEMENT and PLAN:    * Hyperkalemia- (present on admission)  Assessment & Plan  Patient does not wish to pursue dialysis even after extensive risks and benefits discussion.  Patient aware of high risk of mortality and morbidity however the patient like to hold off on dialysis  BMP every 2 hours per nephrology recommendations  Monitor on telemetry  Pursue medical therapy with albuterol therapy, insulin and D50, calcium chloride, bicarbonate drip, IV Lasix, Lokelma    Acute renal failure superimposed on chronic kidney disease (HCC)  Assessment & Plan  Reportedly CKD 4, difficult to ascertain baseline  Renal biopsy in 2021 demonstrated advanced diabetic glomerulosclerosis  Limit nephrotoxins  Continue to monitor kidney function with BMPs    Hyponatremia  Assessment & Plan  Likely related to hypervolemia  Monitor via serial BMPs  Continue IV Lasix    Metabolic acidosis  Assessment & Plan  Likely in setting of significant kidney dysfunction  Bicarbonate drip    Hypocalcemia  Assessment & Plan  Likely due to secondary hyperparathyroidism in setting of CKD  Status post calcium chloride     Anemia due to chronic kidney disease  Assessment & Plan  Likely due to chronic kidney disease  Continue to monitor clinically for blood loss and daily CBCs    Hypertension- (present on admission)  Assessment & Plan  Holding home anti-HTN meds due to labile blood pressures    Chronic systolic heart failure (HCC)  Assessment & Plan  Last Echo in 2021 demonstrating EF of 40%  Will be cautious with fluids        DISPO: Admit to ICU    CODE STATUS: DNR/DNI    Quality Measures:  Feeding: NPO  Analgesia: N/a  Sedation: N/a  Thromboprophylaxis: Heparin  Head of bed: >30 degrees  Ulcer prophylaxis: N/a  Glycemic control: Correctional:   Bowel care: bowel  regimen  Indwelling lines: PIV  Deescalation of antibiotics: N/a      Jacek Herron M.D.

## 2023-11-21 NOTE — ED PROVIDER NOTES
ED Provider Note    CHIEF COMPLAINT  Chief Complaint   Patient presents with    Shortness of Breath     Pt reporting SOB since yesterday. 88% on RA. Pt arrived to ER on 10L NRB. Pt bradycardic in the 40s on arrival     EXTERNAL RECORDS REVIEWED  Patient was last seen in the ER April 2022 for pneumonia.  He was last seen by cardiology June 2021.  History of chronic systolic heart failure with last EF 25 to 30%, hypertension, diabetes, LV thrombus, taken off of warfarin after resolved, stage IV renal disease    HPI/ROS  LIMITATION TO HISTORY   Select: : None  OUTSIDE HISTORIAN(S):  EMS see below    Donte Horta is a 63 y.o. male who presents to the Emergency Department with shortness of breath.  The patient states symptoms started yesterday.  He describes feeling shortness of breath as well as generalized weakness.  He denies any chest pain, mild abdominal discomfort.  He does report lower extremity swelling however cannot really tell me when this has worsened.  Denies any fever or coughing.  He does have a history of renal disease and has never needed to be on dialysis.  He states he has been taking his medications regularly.    PAST MEDICAL HISTORY  Past Medical History:   Diagnosis Date    Anemia     Anticoagulation monitoring, special range     Blood clotting disorder (HCC)     heart    Cardiomyopathy (HCC)     Diabetes (HCC)     no longer on medications per MD, 6/2021    High cholesterol     Hypertension     LV (left ventricular) mural thrombus     on warfarin, no longer on coumadin months ago        SURGICAL HISTORY  Past Surgical History:   Procedure Laterality Date    OTHER ORTHOPEDIC SURGERY      knee, 40 y ago        FAMILY HISTORY  History reviewed. No pertinent family history.    SOCIAL HISTORY   reports that he quit smoking about 26 years ago. His smoking use included cigarettes. He started smoking about 46 years ago. He has a 20.0 pack-year smoking history. He has never used smokeless tobacco. He  "reports current alcohol use. He reports that he does not currently use drugs after having used the following drugs: Marijuana.    CURRENT MEDICATIONS  Current Discharge Medication List        CONTINUE these medications which have NOT CHANGED    Details   spironolactone (ALDACTONE) 25 MG Tab Take 25 mg by mouth every day.      losartan (COZAAR) 100 MG Tab Take 100 mg by mouth every day.      amLODIPine (NORVASC) 10 MG Tab Take 10 mg by mouth every day.      atorvastatin (LIPITOR) 40 MG Tab Take 40 mg by mouth every evening.      carvedilol (COREG) 25 MG Tab Take 1 tablet by mouth 2 times a day with meals.  Qty: 90 tablet, Refills: 3             ALLERGIES  Patient has no known allergies.    PHYSICAL EXAM  BP (!) 147/70   Pulse (!) 58   Temp 36 °C (96.8 °F) (Temporal)   Resp (!) 24   Ht 1.778 m (5' 10\")   Wt 106 kg (234 lb 5.6 oz)   SpO2 97%      Constitutional: Ill appearing. Alert in no apparent distress.  HENT: Normocephalic, Atraumatic. Bilateral external ears normal. Nose normal.  Moist mucous membranes.  Oropharynx clear.  Eyes: Pupils are equal and reactive. Conjunctiva normal.   Neck: Supple, full range of motion  Heart: Bradycardiac, regular rhythm.  No murmurs.    Lungs: No respiratory distress, normal work of breathing.  Faint crackles throughout lung fields.  Abdomen Soft, no distention.  No tenderness to palpation.  Musculoskeletal: Atraumatic. No obvious deformities noted.  Significant bilateral lower extremity edema.  Skin: Warm, Dry.  No erythema, No rash.  Pale  Neurologic: Alert and oriented x3. Moving all extremities spontaneously without focal deficits.  Psychiatric: Affect normal, Mood normal, Appears appropriate and not intoxicated.      DIAGNOSTIC STUDIES / PROCEDURES    EKG  I have independently interpreted this EKG  Results for orders placed or performed during the hospital encounter of 11/20/23   EKG   Result Value Ref Range    Report       [facility] Emergency " Dept.    Test Date:  2023  Pt Name:    JOSE BARRETO               Department: ER  MRN:        [id number]                      Room:       R102  Gender:     Male                         Technician:  :        1960                   Requested By:ER TRIAGE PROTOCOL  Order #:    [id number]                    Reading MD: Irina Mccallum MD    Measurements  Intervals                                Axis  Rate:       47                           P:          0  IA:         0                            QRS:        33  QRSD:       129                          T:          -86  QT:         523  QTc:        463    Interpretive Statements  Atrial fibrillation vs irregular junctional rhythm  Nonspecific intraventricular conduction delay  Nonspecific T abnormalities, inferior leads  Minimal ST elevation, inferior leads  Compared to ECG 2020 15:46:04  Bradycardia and Intraventricular conduction delay now present    Electronically Signed On 2023 06:14:09 PST by Irina Mccallum MD         LABS  Labs Reviewed   TROPONIN - Abnormal; Notable for the following components:       Result Value    Troponin T 55 (*)     All other components within normal limits    Narrative:     Biotin intake of greater than 5 mg per day may interfere with  troponin levels, causing false low values.   PHOSPHORUS - Abnormal; Notable for the following components:    Phosphorus 6.1 (*)     All other components within normal limits   PROBRAIN NATRIURETIC PEPTIDE, NT - Abnormal; Notable for the following components:    NT-proBNP 52543 (*)     All other components within normal limits   CBC WITH DIFFERENTIAL - Abnormal; Notable for the following components:    RBC 2.77 (*)     Hemoglobin 8.3 (*)     Hematocrit 25.3 (*)     RDW 50.8 (*)     Neutrophils-Polys 72.30 (*)     Lymphocytes 15.70 (*)     Immature Granulocytes 1.10 (*)     Nucleated RBC 0.50 (*)     All other components within normal limits   COMP METABOLIC PANEL - Abnormal; Notable for  the following components:    Sodium 129 (*)     Potassium 8.0 (*)     Co2 8 (*)     Glucose 163 (*)     Bun 79 (*)     Creatinine 8.46 (*)     Calcium 7.5 (*)     Correct Calcium 7.7 (*)     AST(SGOT) 151 (*)     ALT(SGPT) 148 (*)     Alkaline Phosphatase 123 (*)     All other components within normal limits   TROPONIN - Abnormal; Notable for the following components:    Troponin T 56 (*)     All other components within normal limits   ESTIMATED GFR - Abnormal; Notable for the following components:    GFR (CKD-EPI) 7 (*)     All other components within normal limits   BASIC METABOLIC PANEL - Abnormal; Notable for the following components:    Sodium 134 (*)     Potassium 6.0 (*)     Co2 10 (*)     Glucose 53 (*)     Bun 80 (*)     Creatinine 8.12 (*)     All other components within normal limits   ESTIMATED GFR - Abnormal; Notable for the following components:    GFR (CKD-EPI) 7 (*)     All other components within normal limits   MAGNESIUM   BASIC METABOLIC PANEL   BASIC METABOLIC PANEL   BASIC METABOLIC PANEL   CBC WITH DIFFERENTIAL   POCT GLUCOSE   POCT GLUCOSE   POCT GLUCOSE   POCT GLUCOSE   POCT GLUCOSE DEVICE RESULTS         RADIOLOGY  I have independently interpreted the diagnostic imaging associated with this visit and am waiting the final reading from the radiologist.   My preliminary interpretation is a follows: Significant cardiomegaly  Radiologist interpretation:   DX-CHEST-PORTABLE (1 VIEW)   Final Result      1.  Marked enlargement of cardiac silhouette      2.  Bilateral atelectasis      3.  Probable small right pleural effusion      4.  Hyperinflation consistent with chronic obstructive pulmonary disease        Point of Care Ultrasound    ED POINT OF CARE ULTRASOUND: LIMITED CARDIAC    Indication for exam: enlarged cardiac silhouette on xray  LVEF: Diminished  Pericardial Effusion:not present  RV Strain:not present  Pulmonary B Lines:not present    Image retained through [facility] as seen below:            This study is a limited ultrasound examination performed and interpreted to evaluate for limited conditions as outlined above. There may be other clinically important information contained in the images that is outside this scope. When clinically warranted, a comprehensive ultrasound through the appropriate department is considered.      COURSE & MEDICAL DECISION MAKING    ED Observation Status? No; Patient does not meet criteria for ED Observation.     INITIAL ASSESSMENT, COURSE AND PLAN  Care Narrative: Patient with history of chronic heart failure as well as chronic kidney disease who presents with shortness of breath and weakness.  He was found to be severely bradycardic in the field by paramedics.  He was given atropine with transient improvement of his heart rate.  On arrival here he is bradycardic however with otherwise reassuring vital signs and mentating.  His EKG shows an irregular rhythm without any P waves present either slow atrial fibrillation or irregular junctional rhythm.  There is no evidence of heart block.  There was concern for hyperkalemia prior to labs returning therefore he was given a gram of calcium chloride however this did not improve his heart rate.  His labs did return with a potassium of 8 in addition to bicarb of 8 and a creatinine of 8.  He was immediately given insulin and dextrose in addition to bicarb boluses and initiated on a bicarb drip.  His labs otherwise show worsening anemia likely related to renal function as well as mildly elevated troponin and BNP also related to above.  His chest x-ray shows a markedly enlarged cardiac silhouette.  Bedside echo was performed without evidence of pericardial effusion.    1:00 AM - On reassessment, patient continues to rest comfortably with stable vital signs although he remains very bradycardic in the 30s.  I had a long discussion with him regarding the diagnosis and likely need for dialysis.  The patient is very hesitant to start  dialysis and is requesting to be treated with medications only.    1:21 AM - I discussed the case with Dr. Madrid, nephrologist on-call.  He is recommending insulin and dextrose now and repeating potassium every 1.5 hours.  If it continues to be above 6 point 5 repeat insulin and dextrose.  We will start him on a bicarb drip at 125 an hour and following this give 80 mg of IV furosemide.  The patient will be admitted to the ICU.    ADDITIONAL PROBLEM LIST  Problem #1: Hyperkalemia - due to acute renal failure, treated as above, will patient refusing dialysis, will admit to the ICU for close monitoring and ongoing treatment    Problem #2: Bradycardia - due to above and possibly exacerbated by ongoing beta-blocker use, continue to monitor    Problem #3: Congestive heart failure - echo as inpatient, given dose of furosemide    Problem #4: Anemia -likely related to renal disease, no history of recent bleeding    DISPOSITION AND DISCUSSIONS  I have discussed management of the patient with the following physicians and TIFFANIE's:    Dr. Hernandez, intensivist on-call, accepts admission of the patient    CRITICAL CARE  The very real possibilty of a deterioration of this patient's condition required the highest level of my preparedness for sudden, emergent intervention.  I provided critical care services, which included medication orders, frequent reevaluations of the patient's condition and response to treatment, ordering and reviewing test results, and discussing the case with various consultants.  The critical care time associated with the care of the patient was 46 minutes. Review chart for interventions. This time is exclusive of any other billable procedures.       DISPOSITION:  Patient will be hospitalized by Dr. Petit in critical condition.      FINAL DIAGNOSIS  1. Bradycardia    2. Hyperkalemia    3. Acute renal failure, unspecified acute renal failure type (HCC)    4. Anemia, unspecified type    5. Acute on chronic  congestive heart failure, unspecified heart failure type (HCC)

## 2023-11-21 NOTE — ASSESSMENT & PLAN NOTE
Patient has not followed up with cardiology or nephrology as outpatient and was stretching his outpatient medications  Counseled on compliance with medical therapy

## 2023-11-21 NOTE — ASSESSMENT & PLAN NOTE
Continue insulin sliding scale and monitor CBGs   hemoglobin A1c 5.8    11/27/23  Blood glucose controlled.  Continue lispro insulin sign scale

## 2023-11-21 NOTE — PROGRESS NOTES
4 Eyes Skin Assessment Completed by FABIANA Godoy and FABIANA Elmore.    Head WDL  Ears WDL  Nose WDL  Mouth WDL  Neck WDL  Breast/Chest WDL  Shoulder Blades WDL  Spine WDL  (R) Arm/Elbow/Hand WDL  (L) Arm/Elbow/Hand WDL  Abdomen WDL  Groin WDL  Scrotum/Coccyx/Buttocks WDL  (R) Leg WDL  (L) Leg WDL  (R) Heel/Foot/Toe WDL  (L) Heel/Foot/Toe Discoloration and Bruising          Devices In Places ECG, Blood Pressure Cuff, Pulse Ox, and Nasal Cannula      Interventions In Place Gray Ear Foams, Pillows, Q2 Turns, and Low Air Loss Mattress    Possible Skin Injury No    Pictures Uploaded Into Epic N/A  Wound Consult Placed N/A  RN Wound Prevention Protocol Ordered No

## 2023-11-21 NOTE — CARE PLAN
The patient is Stable - Low risk of patient condition declining or worsening    Shift Goals  Clinical Goals: Optimize electrolyte balance, Sodium Bicarb gtt, Q2 BMP's  Patient Goals: Rest, Sleep  Family Goals: EFRAIN    Progress made toward(s) clinical / shift goals:        Problem: Skin Integrity  Goal: Skin integrity is maintained or improved  Description: Target End Date:  Prior to discharge or change in level of care    Document interventions on Skin Risk/Geo flowsheet groups and corresponding LDA    1.  Assess and monitor skin integrity, appearance and/or temperature  2.  Assess risk factors for impaired skin integrity and/or pressures ulcers  3.  Implement precautions to protect skin integrity in collaboration with interdisciplinary team  4.  Implement pressure ulcer prevention protocol if at risk for skin breakdown  5.  Confirm wound care consult if at risk for skin breakdown  6.  Ensure patient use of pressure relieving devices  (Low air loss bed, waffle overlay, heel protectors, ROHO cushion, etc)  Outcome: Progressing     Problem: Urinary Elimination:  Goal: Ability to achieve and maintain adequate renal perfusion and functioning will improve  Outcome: Progressing     Problem: Hemodynamics  Goal: Patient's hemodynamics, fluid balance and neurologic status will be stable or improve  Description: Target End Date:  Prior to discharge or change in level of care    Document on Assessment and I/O flowsheet templates    1.  Monitor vital signs, pulse oximetry and cardiac monitor per provider order and/or policy  2.  Maintain blood pressure per provider order  3.  Hemodynamic monitoring per provider order  4.  Manage IV fluids and IV infusions  5.  Monitor intake and output  6.  Daily weights per unit policy or provider order  7.  Assess peripheral pulses and capillary refill  8.  Assess color and body temperature  9.  Position patient for maximum circulation/cardiac output  10. Monitor for signs/symptoms of  excessive bleeding  11. Assess mental status, restlessness and changes in level of consciousness  12. Monitor temperature and report fever or hypothermia to provider immediately. Consideration of targeted temperature management.  Outcome: Progressing       Patient is not progressing towards the following goals:

## 2023-11-21 NOTE — ASSESSMENT & PLAN NOTE
Given hyperkalemia losartan and spironolactone were discontinued  Given bradycardia carvedilol was discontinued we will consider resuming beta-blocker if heart rate remains stable  Transition to PO lasix, lasix 80mg BID  Resume home coreg

## 2023-11-21 NOTE — ASSESSMENT & PLAN NOTE
Likely due to chronic kidney disease  Continue to monitor clinically for blood loss and daily CBCs

## 2023-11-21 NOTE — DISCHARGE PLANNING
"Case Management Discharge Planning    Admission Date: 11/20/2023  GMLOS: 3.1  ALOS: 0    6-Clicks ADL Score:    6-Clicks Mobility Score:        Anticipated Discharge Disposition: Discharged to home/self care (01)  Discharge Address: [address] [place] [id number]  Discharge Contact Phone Number: [phone]    DME Needed: No    Action(s) Taken: Chart reviewed & case discussed in ICU rounds:  Patient admitted today for hyperkalemia, LEANNA on CKD, and metabolic acidosis.  +Sodium bicarb drip.  Patient refusing dialysis.  Nephrology following.     CM met at bedside with patient to complete assessment and discuss DCP.  Patient, Lauren at time of interview, reported residing with his Significant Other, Leydi, in a single level house (Address:  [address] [place] [id number]) and plans to return to same residence upon discharge; Patient's grandchildren (9 & 5 yo) stay with them on certain days.   Prior to admission patient was independent with ADLs and ambulating w/o assistive devices.  He denies any DME use.   Hx of ETOH abuse acknowledged by patient who declined list of Community Resources for alcohol cessation programs and stated that he \"no longer drinks a lot\"; He reports last drinks (2 beers) approximately two weeks ago.   No Hx of drug abuse.   Patient is employed part-time & collect SS benefits ($450/month).    PCP:  Anmol Mccray MD    Return transportation to home will be provided by patient's SO/Leydi.    **1609 Hrs - Hospice referral noted in Epic & discussed with patient who is undecided about enrolling in Hospice Care and would like referral sent for information only.   Patient selected following Hospice agencies:  1-[facility], [facility], 3-[facility], 4-[facility].    Referral submitted via Epic & choice form faxed to [facility].      Escalations Completed: None    Medically Clear: No    Next Steps: CM remains available for assistance with DCP.    Barriers to Discharge: Medical clearance    Is the " patient up for discharge tomorrow: No      Care Transition Team Assessment    Information Source  Orientation Level: Oriented X4  Information Given By: Patient, Other (Comments) (Chart review)  Who is responsible for making decisions for patient? : Patient    Readmission Evaluation  Is this a readmission?: No    Elopement Risk  Legal Hold: No  Ambulatory or Self Mobile in Wheelchair: No-Not an Elopement Risk  Elopement Risk: Not at Risk for Elopement     Discharge Preparedness  What is your plan after discharge?: Home with help  What are your discharge supports?: Partner (Significant Other (Leydi))  Prior Functional Level: Ambulatory, Independent with Activities of Daily Living, Independent with Medication Management    Functional Assesment  Prior Functional Level: Ambulatory, Independent with Activities of Daily Living, Independent with Medication Management    Finances  Financial Barriers to Discharge: No  Prescription Coverage: Yes     Advance Directive  Advance Directive?: DPOA for Health Care (Not on file)  Durable Power of  Name and Contact : Malu Devine (sister) #[phone]    Domestic Abuse  Have you ever been the victim of abuse or violence?: No    Psychological Assessment  History of Substance Abuse: Alcohol  Date Last Used - Alcohol: 2 weeks ago  Substance Abuse Comments: Patient reported no longer drinking heavily  History of Psychiatric Problems: No  Non-compliant with Treatment: Yes  Newly Diagnosed Illness: Yes    Discharge Risks or Barriers  Discharge risks or barriers?: Complex medical needs  Patient risk factors: Complex medical needs    Anticipated Discharge Information  Discharge Disposition: Discharged to home/self care (01)  Discharge Address: [address] [place] [address]  Discharge Contact Phone Number: [phone]

## 2023-11-21 NOTE — ASSESSMENT & PLAN NOTE
Secondary to progression of end-stage renal disease  Hyperkalemia resolved discussed with nephrology bicarb drip and Lokelma discontinued     discussed with the patient that he has progressed to ESRD and that he will likely need hemodialysis  Patient initially declining hemodialysis, he has changed his mind now and agrees to starting dialysis  No hospice at this time  Permacath ordered, will need outpatient dialysis chair set up as well prior to discharge    11/26/23  Resolved with dialysis.  Potassium of 4.0 today.

## 2023-11-22 LAB
ALBUMIN SERPL BCP-MCNC: 3.2 G/DL (ref 3.2–4.9)
BUN SERPL-MCNC: 67 MG/DL (ref 8–22)
CALCIUM ALBUM COR SERPL-MCNC: 8 MG/DL (ref 8.5–10.5)
CALCIUM SERPL-MCNC: 7.4 MG/DL (ref 8.5–10.5)
CHLORIDE SERPL-SCNC: 100 MMOL/L (ref 96–112)
CO2 SERPL-SCNC: 21 MMOL/L (ref 20–33)
CREAT SERPL-MCNC: 7.42 MG/DL (ref 0.5–1.4)
ERYTHROCYTE [DISTWIDTH] IN BLOOD BY AUTOMATED COUNT: 45.9 FL (ref 35.9–50)
EST. AVERAGE GLUCOSE BLD GHB EST-MCNC: 120 MG/DL
GFR SERPLBLD CREATININE-BSD FMLA CKD-EPI: 8 ML/MIN/1.73 M 2
GLUCOSE BLD STRIP.AUTO-MCNC: 119 MG/DL (ref 65–99)
GLUCOSE BLD STRIP.AUTO-MCNC: 151 MG/DL (ref 65–99)
GLUCOSE BLD STRIP.AUTO-MCNC: 158 MG/DL (ref 65–99)
GLUCOSE BLD STRIP.AUTO-MCNC: 195 MG/DL (ref 65–99)
GLUCOSE SERPL-MCNC: 165 MG/DL (ref 65–99)
HBA1C MFR BLD: 5.8 % (ref 4–5.6)
HCT VFR BLD AUTO: 21.1 % (ref 42–52)
HGB BLD-MCNC: 7.3 G/DL (ref 14–18)
MCH RBC QN AUTO: 29.7 PG (ref 27–33)
MCHC RBC AUTO-ENTMCNC: 34.6 G/DL (ref 32.3–36.5)
MCV RBC AUTO: 85.8 FL (ref 81.4–97.8)
PHOSPHATE SERPL-MCNC: 4.1 MG/DL (ref 2.5–4.5)
PLATELET # BLD AUTO: 223 K/UL (ref 164–446)
PMV BLD AUTO: 10.7 FL (ref 9–12.9)
POTASSIUM SERPL-SCNC: 3.8 MMOL/L (ref 3.6–5.5)
RBC # BLD AUTO: 2.46 M/UL (ref 4.7–6.1)
SODIUM SERPL-SCNC: 134 MMOL/L (ref 135–145)
WBC # BLD AUTO: 8.7 K/UL (ref 4.8–10.8)

## 2023-11-22 PROCEDURE — 700111 HCHG RX REV CODE 636 W/ 250 OVERRIDE (IP): Performed by: NURSE PRACTITIONER

## 2023-11-22 PROCEDURE — 99232 SBSQ HOSP IP/OBS MODERATE 35: CPT | Performed by: HOSPITALIST

## 2023-11-22 PROCEDURE — A9270 NON-COVERED ITEM OR SERVICE: HCPCS

## 2023-11-22 PROCEDURE — 700111 HCHG RX REV CODE 636 W/ 250 OVERRIDE (IP)

## 2023-11-22 PROCEDURE — 700102 HCHG RX REV CODE 250 W/ 637 OVERRIDE(OP): Performed by: HOSPITALIST

## 2023-11-22 PROCEDURE — A9270 NON-COVERED ITEM OR SERVICE: HCPCS | Performed by: HOSPITALIST

## 2023-11-22 PROCEDURE — 82962 GLUCOSE BLOOD TEST: CPT

## 2023-11-22 PROCEDURE — 700102 HCHG RX REV CODE 250 W/ 637 OVERRIDE(OP): Performed by: INTERNAL MEDICINE

## 2023-11-22 PROCEDURE — 83036 HEMOGLOBIN GLYCOSYLATED A1C: CPT

## 2023-11-22 PROCEDURE — 700105 HCHG RX REV CODE 258: Performed by: NURSE PRACTITIONER

## 2023-11-22 PROCEDURE — 700102 HCHG RX REV CODE 250 W/ 637 OVERRIDE(OP)

## 2023-11-22 PROCEDURE — 700111 HCHG RX REV CODE 636 W/ 250 OVERRIDE (IP): Performed by: HOSPITALIST

## 2023-11-22 PROCEDURE — 85027 COMPLETE CBC AUTOMATED: CPT

## 2023-11-22 PROCEDURE — 80069 RENAL FUNCTION PANEL: CPT

## 2023-11-22 PROCEDURE — 770006 HCHG ROOM/CARE - MED/SURG/GYN SEMI*

## 2023-11-22 RX ORDER — ACETAMINOPHEN 325 MG/1
650 TABLET ORAL EVERY 6 HOURS PRN
Status: DISCONTINUED | OUTPATIENT
Start: 2023-11-22 | End: 2023-11-30 | Stop reason: HOSPADM

## 2023-11-22 RX ORDER — HYDRALAZINE HYDROCHLORIDE 50 MG/1
25 TABLET, FILM COATED ORAL EVERY 8 HOURS
Status: DISCONTINUED | OUTPATIENT
Start: 2023-11-22 | End: 2023-11-30 | Stop reason: HOSPADM

## 2023-11-22 RX ADMIN — SODIUM BICARBONATE: 84 INJECTION, SOLUTION INTRAVENOUS at 02:25

## 2023-11-22 RX ADMIN — HYDRALAZINE HYDROCHLORIDE 10 MG: 10 TABLET, FILM COATED ORAL at 14:17

## 2023-11-22 RX ADMIN — ACETAMINOPHEN 650 MG: 325 TABLET, FILM COATED ORAL at 20:20

## 2023-11-22 RX ADMIN — HYDRALAZINE HYDROCHLORIDE 25 MG: 50 TABLET, FILM COATED ORAL at 20:19

## 2023-11-22 RX ADMIN — FUROSEMIDE 80 MG: 10 INJECTION, SOLUTION INTRAMUSCULAR; INTRAVENOUS at 16:24

## 2023-11-22 RX ADMIN — HYDRALAZINE HYDROCHLORIDE 10 MG: 10 TABLET, FILM COATED ORAL at 05:10

## 2023-11-22 RX ADMIN — INSULIN LISPRO 1 UNITS: 100 INJECTION, SOLUTION INTRAVENOUS; SUBCUTANEOUS at 18:37

## 2023-11-22 RX ADMIN — FUROSEMIDE 80 MG: 10 INJECTION, SOLUTION INTRAMUSCULAR; INTRAVENOUS at 05:09

## 2023-11-22 RX ADMIN — INSULIN LISPRO 1 UNITS: 100 INJECTION, SOLUTION INTRAVENOUS; SUBCUTANEOUS at 06:15

## 2023-11-22 RX ADMIN — HEPARIN SODIUM 5000 UNITS: 5000 INJECTION, SOLUTION INTRAVENOUS; SUBCUTANEOUS at 05:09

## 2023-11-22 ASSESSMENT — ENCOUNTER SYMPTOMS
SHORTNESS OF BREATH: 0
PALPITATIONS: 0
FEVER: 0

## 2023-11-22 ASSESSMENT — FIBROSIS 4 INDEX: FIB4 SCORE: 3.04

## 2023-11-22 NOTE — CARE PLAN
The patient is Stable - Low risk of patient condition declining or worsening    Shift Goals  Clinical Goals: Remain pain free/comfortable  Patient Goals: rest, go home  Family Goals: unable to assess    Progress made toward(s) clinical / shift goals:    Problem: Fall Risk  Goal: Patient will remain free from falls  Outcome: Progressing     Problem: Psychosocial Needs:  Goal: Ability to cope will improve  Outcome: Progressing

## 2023-11-22 NOTE — CARE PLAN
The patient is Stable - Low risk of patient condition declining or worsening    Shift Goals  Clinical Goals: Monitor Lytes  Patient Goals: Rest  Family Goals: EFRAIN    Progress made toward(s) clinical / shift goals:    Problem: Knowledge Deficit - Standard  Goal: Patient and family/care givers will demonstrate understanding of plan of care, disease process/condition, diagnostic tests and medications  Outcome: Progressing     Problem: Psychosocial Needs:  Goal: Ability to cope will improve  Outcome: Progressing     Problem: Knowledge Deficit:  Goal: Patient's knowledge of the prescribed therapeutic regimen will improve  Outcome: Progressing       Patient is not progressing towards the following goals:   Problem: Hemodynamics  Goal: Patient's hemodynamics, fluid balance and neurologic status will be stable or improve  Outcome: Progressing

## 2023-11-22 NOTE — PROGRESS NOTES
4 Eyes Skin Assessment Completed by FABIANA Mota and FABIANA Forrester.    Head WDL  Ears WDL  Nose WDL  Mouth WDL  Neck WDL  Breast/Chest WDL  Shoulder Blades WDL  Spine WDL  (R) Arm/Elbow/Hand WDL  (L) Arm/Elbow/Hand WDL  Abdomen WDL  Groin WDL  Scrotum/Coccyx/Buttocks WDL  (R) Leg WDL  (L) Leg WDL  (R) Heel/Foot/Toe WDL  (L) Heel/Foot/Toe WDL          Devices In Places none      Interventions In Place N/A    Possible Skin Injury No    Pictures Uploaded Into Epic N/A  Wound Consult Placed N/A  RN Wound Prevention Protocol Ordered No

## 2023-11-22 NOTE — DISCHARGE PLANNING
Case Management Discharge Planning    Received T/C from Anita @ [facility] stating that referral was received and she will come to bedside this morning to speak to patient.      **0929 Hrs - Patient transferred out of ICU to ; CM informed Anita (#[phone])@ [facility] of transfer.

## 2023-11-22 NOTE — PROGRESS NOTES
"[facility] -  PROGRESS NOTE               Author: Dionna Nielsen M.D. Date & Time: 11/22/2023  8:49 AM     HPI:  Mr. 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 He reports having \"cold like symptoms\" with productive cough and SOB that he describes as inability to take a full breath worse on exertion and lying down flat. He denies chest pain, palpitations, headaches, profuse sweating, fevers or chills, etc.      He reported taking half the dose of his home meds for a few weeks prior to current hospitalization in order to make them last till his doctor appt. He restarted taking full doses of his meds including aldactone, losartan, amlodipine, atorva, and coreg on Saturday or Sunday which is when his sxs of SOB also seem to have started.     He was found to have hyperkalemia with K of 8, Scr of 8 similar to prior known Scr in 2021. Labs also showed HAGMA, with bicarb of 7-9 at admission. He refuses HD due to personal preferences. Hence hyperkalemia was managed medically and nephrology was consulted for management of hyperkalemia and CKD 5 which is progressing over the years.     DAILY NEPHROLOGY SUMMARY:  11/21: Initial consult done. K and metabolic acidosis improved with meds, sxs of SOB also improved with tx. Seen by palliative care for transition to hospice at discharge.   11/22: No overnight events. Weaning off O2. Hyperkalemia and HAGMA have resolved on labs this morning. Discontinued Lokelma (K <4) and bicarb (CO2 21).     REVIEW OF SYSTEMS:    10 point ROS reviewed and is as per HPI/daily summary or otherwise negative    PMH/PSH/SH/FH:   Reviewed and unchanged since " "admission note    CURRENT MEDICATIONS:   Reviewed from admission to present day    VS:  BP (!) 152/67   Pulse 68   Temp 36.7 °C (98.1 °F) (Temporal)   Resp 19   Ht 1.778 m (5' 10\")   Wt 106 kg (234 lb 5.6 oz)   SpO2 91%   BMI 33.63 kg/m²     Physical Exam  Vitals and nursing note reviewed.   Constitutional:       General: He is not in acute distress.     Appearance: Normal appearance. He is obese. He is not ill-appearing, toxic-appearing or diaphoretic.   HENT:      Head: Normocephalic and atraumatic.      Mouth/Throat:      Mouth: Mucous membranes are moist.      Pharynx: Oropharynx is clear.   Eyes:      General: No scleral icterus.        Right eye: No discharge.         Left eye: No discharge.      Extraocular Movements: Extraocular movements intact.      Conjunctiva/sclera: Conjunctivae normal.      Pupils: Pupils are equal, round, and reactive to light.   Cardiovascular:      Rate and Rhythm: Normal rate and regular rhythm.      Pulses: Normal pulses.      Heart sounds: Normal heart sounds. No murmur heard.     No friction rub. No gallop.      Comments: No JVD or hepatojugular reflex  Pulmonary:      Effort: Pulmonary effort is normal. No respiratory distress.      Breath sounds: Normal breath sounds. No wheezing, rhonchi or rales.   Chest:      Chest wall: No tenderness.   Abdominal:      General: Abdomen is flat. Bowel sounds are normal. There is no distension.      Palpations: Abdomen is soft. There is no mass.      Tenderness: There is no abdominal tenderness. There is no guarding or rebound.   Musculoskeletal:      Cervical back: Normal range of motion and neck supple.      Right lower leg: Edema (1+ till the ankles) present.      Left lower leg: Edema (1+ till the ankles) present.      Comments: B/L 1+ pitting pedal edema noted till the ankles, symmetric bilaterally.    Skin:     General: Skin is warm.      Capillary Refill: Capillary refill takes less than 2 seconds.      Coloration: Skin is not " jaundiced.   Neurological:      General: No focal deficit present.      Mental Status: He is alert and oriented to person, place, and time. Mental status is at baseline.      Cranial Nerves: No cranial nerve deficit.   Psychiatric:         Mood and Affect: Mood normal.         Behavior: Behavior normal.         Thought Content: Thought content normal.         Judgment: Judgment normal.         Fluids:  In: 2561.5 [P.O.:720; I.V.:1721.5; Other:120]  Out: 4890     LABS:  Recent Labs     11/21/23  0830 11/21/23  1210 11/21/23  2200   SODIUM 131* 133* 133*   POTASSIUM 5.8* 5.2 4.2   CHLORIDE 106 105 102   CO2 10* 13* 18*   GLUCOSE 145* 143* 147*   BUN 76* 74* 72*   CREATININE 7.92* 7.74* 7.62*   CALCIUM 8.4* 8.4* 7.8*       IMAGING:  Reviewed results of the CXR. On my personal review, the cardiac silhouette appears slightly enlarged with blunting of b/l costophrenic angles, worse on the left side.      IMPRESSION:     # Hyperkalemia --- Resolved  - Likely secondary to renal insufficiency and home meds.   - K of 8 at admission, now down to 3.8-4.2 on most recent labs.  - Declined HD at admission, hence medically managed. This admission, he has received IV Ca chloride, albuterol neb, IV lasix 80 BID, sodium bicarb 100mEq and then continuous infusion, and lokelma 10g daily.     # HAGMA --- Resolved  - Appropriately compensated respiratory alkalosis. Underlying NAGMA could be due to RTA type 4 (Has DM nephropathy, on ARB, etc).   - Resolved with sodium bicarb infusion, which was discontinued on 11/22.      # Sinus bradycardia --- Resolved  - Likely secondary to hyperkalemia. Resolved with improvement in K levels.      # CKD stage 5  # Type 2 diabetes mellitus with diabetic nephropathy  - SCr 8.07 this admission, similar to last known baseline SCr from 2021  - Renal biopsy in 08/2021 showed advanced diabetic glomerulosclerosis class IV  - Pt declines HD despite extensive counseling. This is due to personal preference from  having seen multiple family members having gone through ESRD and HD.  - Noted to have good urine output of 4.6L in the past 24 hours.   - Seen by palliative care on 11/21, plan to transition to hospice care at discharge      # Hypertension  - Goal BP < 140/90  - Has been started on PO Hydralazine 10 TID     # Anemia of CKD  - Goal Hgb 10-11, currently not at goal     PLAN:  - Hyperkalemia and HAGMA have resolved. Discontinue sodium bicarb and Lokelma since K is now <4. No need of starting PO bicarb.   - Hold ACEi/ARBs and potassium sparing diuretics.    - Low potassium diet/ renal diet and renally dose all meds    Pt met with Palliative care specialist on 11/21, plan to go home on hospice.     Nephrology will sign off today, will f/u prn. DC with hospice per Palliative care.   Please feel free to re-consult if pt changes his mind about HD.     Thank you for the consult.     Dr. Dionna Nielsen MD   [employer] IM PGY 2 Resident    Please refer to Dr. Zuleta's attestation for additional comments/recommendations.

## 2023-11-22 NOTE — PROGRESS NOTES
Pt arrived to floor, oriented to call light and when to call.  Bed locked and low, treaded socks on pt.  No complaints at this time.

## 2023-11-22 NOTE — CARE PLAN
The patient is Stable - Low risk of patient condition declining or worsening    Shift Goals  Clinical Goals: monitor electrolytes and labs, Q6 BMPs  Patient Goals: sleep, rest  Family Goals: EFRAIN    Progress made toward(s) clinical / shift goals:    Problem: Knowledge Deficit - Standard  Goal: Patient and family/care givers will demonstrate understanding of plan of care, disease process/condition, diagnostic tests and medications  Outcome: Progressing     Problem: Fall Risk  Goal: Patient will remain free from falls  Outcome: Progressing     Problem: Psychosocial Needs:  Goal: Ability to cope will improve  Outcome: Progressing     Problem: Urinary Elimination:  Goal: Ability to achieve and maintain adequate renal perfusion and functioning will improve  Outcome: Progressing  Goal: Ability to achieve a balanced intake and output will improve  Outcome: Progressing       Patient is not progressing towards the following goals:

## 2023-11-22 NOTE — CONSULTS
Palliative Care   Duplicate consult, patient is currently being followed by palliative care team.     Thank you for allowing Palliative Care in support of this patient and family. Please contact  with any changes in status, questions, or concerns.     Martha Echeverria, MSN, APRN, ACNPC-AG.  Palliative Care Nurse Practitioner  [phone]

## 2023-11-23 LAB
ANION GAP SERPL CALC-SCNC: 13 MMOL/L (ref 7–16)
BUN SERPL-MCNC: 62 MG/DL (ref 8–22)
CALCIUM SERPL-MCNC: 7.1 MG/DL (ref 8.5–10.5)
CHLORIDE SERPL-SCNC: 96 MMOL/L (ref 96–112)
CO2 SERPL-SCNC: 22 MMOL/L (ref 20–33)
CREAT SERPL-MCNC: 7.35 MG/DL (ref 0.5–1.4)
ERYTHROCYTE [DISTWIDTH] IN BLOOD BY AUTOMATED COUNT: 45.5 FL (ref 35.9–50)
ERYTHROCYTE [DISTWIDTH] IN BLOOD BY AUTOMATED COUNT: 46.1 FL (ref 35.9–50)
FERRITIN SERPL-MCNC: 175 NG/ML (ref 22–322)
GFR SERPLBLD CREATININE-BSD FMLA CKD-EPI: 8 ML/MIN/1.73 M 2
GLUCOSE BLD STRIP.AUTO-MCNC: 108 MG/DL (ref 65–99)
GLUCOSE BLD STRIP.AUTO-MCNC: 131 MG/DL (ref 65–99)
GLUCOSE BLD STRIP.AUTO-MCNC: 157 MG/DL (ref 65–99)
GLUCOSE BLD STRIP.AUTO-MCNC: 179 MG/DL (ref 65–99)
GLUCOSE SERPL-MCNC: 121 MG/DL (ref 65–99)
HCT VFR BLD AUTO: 22.8 % (ref 42–52)
HCT VFR BLD AUTO: 25.3 % (ref 42–52)
HGB BLD-MCNC: 7.8 G/DL (ref 14–18)
HGB BLD-MCNC: 8.6 G/DL (ref 14–18)
HGB RETIC QN AUTO: 31.5 PG/CELL (ref 29–35)
IMM RETICS NFR: 31.1 % (ref 2.6–16.1)
IRON SATN MFR SERPL: 13 % (ref 15–55)
IRON SERPL-MCNC: 25 UG/DL (ref 50–180)
MCH RBC QN AUTO: 29.4 PG (ref 27–33)
MCH RBC QN AUTO: 29.6 PG (ref 27–33)
MCHC RBC AUTO-ENTMCNC: 34 G/DL (ref 32.3–36.5)
MCHC RBC AUTO-ENTMCNC: 34.2 G/DL (ref 32.3–36.5)
MCV RBC AUTO: 86 FL (ref 81.4–97.8)
MCV RBC AUTO: 86.9 FL (ref 81.4–97.8)
PLATELET # BLD AUTO: 257 K/UL (ref 164–446)
PLATELET # BLD AUTO: 281 K/UL (ref 164–446)
PMV BLD AUTO: 10.4 FL (ref 9–12.9)
PMV BLD AUTO: 11.5 FL (ref 9–12.9)
POTASSIUM SERPL-SCNC: 4.1 MMOL/L (ref 3.6–5.5)
RBC # BLD AUTO: 2.65 M/UL (ref 4.7–6.1)
RBC # BLD AUTO: 2.91 M/UL (ref 4.7–6.1)
RETICS # AUTO: 0.09 M/UL (ref 0.04–0.12)
RETICS/RBC NFR: 3 % (ref 0.8–2.6)
SODIUM SERPL-SCNC: 131 MMOL/L (ref 135–145)
TIBC SERPL-MCNC: 193 UG/DL (ref 250–450)
UIBC SERPL-MCNC: 168 UG/DL (ref 110–370)
WBC # BLD AUTO: 10.2 K/UL (ref 4.8–10.8)
WBC # BLD AUTO: 10.4 K/UL (ref 4.8–10.8)

## 2023-11-23 PROCEDURE — 700102 HCHG RX REV CODE 250 W/ 637 OVERRIDE(OP): Performed by: STUDENT IN AN ORGANIZED HEALTH CARE EDUCATION/TRAINING PROGRAM

## 2023-11-23 PROCEDURE — 700111 HCHG RX REV CODE 636 W/ 250 OVERRIDE (IP)

## 2023-11-23 PROCEDURE — 700111 HCHG RX REV CODE 636 W/ 250 OVERRIDE (IP): Mod: JZ | Performed by: HOSPITALIST

## 2023-11-23 PROCEDURE — A9270 NON-COVERED ITEM OR SERVICE: HCPCS | Performed by: HOSPITALIST

## 2023-11-23 PROCEDURE — A9270 NON-COVERED ITEM OR SERVICE: HCPCS | Performed by: NURSE PRACTITIONER

## 2023-11-23 PROCEDURE — 99232 SBSQ HOSP IP/OBS MODERATE 35: CPT | Performed by: STUDENT IN AN ORGANIZED HEALTH CARE EDUCATION/TRAINING PROGRAM

## 2023-11-23 PROCEDURE — 770006 HCHG ROOM/CARE - MED/SURG/GYN SEMI*

## 2023-11-23 PROCEDURE — 82728 ASSAY OF FERRITIN: CPT

## 2023-11-23 PROCEDURE — 36415 COLL VENOUS BLD VENIPUNCTURE: CPT

## 2023-11-23 PROCEDURE — 700102 HCHG RX REV CODE 250 W/ 637 OVERRIDE(OP): Performed by: HOSPITALIST

## 2023-11-23 PROCEDURE — 700102 HCHG RX REV CODE 250 W/ 637 OVERRIDE(OP)

## 2023-11-23 PROCEDURE — 83540 ASSAY OF IRON: CPT

## 2023-11-23 PROCEDURE — 83550 IRON BINDING TEST: CPT

## 2023-11-23 PROCEDURE — 80048 BASIC METABOLIC PNL TOTAL CA: CPT

## 2023-11-23 PROCEDURE — A9270 NON-COVERED ITEM OR SERVICE: HCPCS

## 2023-11-23 PROCEDURE — 85027 COMPLETE CBC AUTOMATED: CPT

## 2023-11-23 PROCEDURE — 700102 HCHG RX REV CODE 250 W/ 637 OVERRIDE(OP): Performed by: NURSE PRACTITIONER

## 2023-11-23 PROCEDURE — A9270 NON-COVERED ITEM OR SERVICE: HCPCS | Performed by: STUDENT IN AN ORGANIZED HEALTH CARE EDUCATION/TRAINING PROGRAM

## 2023-11-23 PROCEDURE — 85046 RETICYTE/HGB CONCENTRATE: CPT

## 2023-11-23 PROCEDURE — 82962 GLUCOSE BLOOD TEST: CPT | Mod: 91

## 2023-11-23 RX ORDER — CARVEDILOL 25 MG/1
25 TABLET ORAL 2 TIMES DAILY WITH MEALS
Status: DISCONTINUED | OUTPATIENT
Start: 2023-11-23 | End: 2023-11-30 | Stop reason: HOSPADM

## 2023-11-23 RX ORDER — FUROSEMIDE 40 MG/1
40 TABLET ORAL
Status: DISCONTINUED | OUTPATIENT
Start: 2023-11-23 | End: 2023-11-29

## 2023-11-23 RX ADMIN — HYDRALAZINE HYDROCHLORIDE 25 MG: 50 TABLET, FILM COATED ORAL at 16:23

## 2023-11-23 RX ADMIN — FUROSEMIDE 40 MG: 40 TABLET ORAL at 16:23

## 2023-11-23 RX ADMIN — CARVEDILOL 25 MG: 25 TABLET, FILM COATED ORAL at 17:31

## 2023-11-23 RX ADMIN — HEPARIN SODIUM 5000 UNITS: 5000 INJECTION, SOLUTION INTRAVENOUS; SUBCUTANEOUS at 05:11

## 2023-11-23 RX ADMIN — HEPARIN SODIUM 5000 UNITS: 5000 INJECTION, SOLUTION INTRAVENOUS; SUBCUTANEOUS at 17:31

## 2023-11-23 RX ADMIN — SENNOSIDES AND DOCUSATE SODIUM 2 TABLET: 50; 8.6 TABLET ORAL at 17:31

## 2023-11-23 RX ADMIN — INSULIN LISPRO 1 UNITS: 100 INJECTION, SOLUTION INTRAVENOUS; SUBCUTANEOUS at 20:56

## 2023-11-23 RX ADMIN — HYDRALAZINE HYDROCHLORIDE 25 MG: 50 TABLET, FILM COATED ORAL at 05:11

## 2023-11-23 RX ADMIN — FUROSEMIDE 80 MG: 10 INJECTION, SOLUTION INTRAMUSCULAR; INTRAVENOUS at 05:10

## 2023-11-23 RX ADMIN — INSULIN LISPRO 1 UNITS: 100 INJECTION, SOLUTION INTRAVENOUS; SUBCUTANEOUS at 13:53

## 2023-11-23 RX ADMIN — ACETAMINOPHEN 650 MG: 325 TABLET, FILM COATED ORAL at 03:41

## 2023-11-23 RX ADMIN — HYDRALAZINE HYDROCHLORIDE 25 MG: 50 TABLET, FILM COATED ORAL at 22:49

## 2023-11-23 ASSESSMENT — FIBROSIS 4 INDEX: FIB4 SCORE: 2.78

## 2023-11-23 ASSESSMENT — ENCOUNTER SYMPTOMS
COUGH: 0
FEVER: 0
EYE PAIN: 0
ABDOMINAL PAIN: 0
NAUSEA: 0
VOMITING: 0
DIZZINESS: 0
PALPITATIONS: 0
CHILLS: 0
BLURRED VISION: 0
SENSORY CHANGE: 0
FOCAL WEAKNESS: 0
INSOMNIA: 0
BACK PAIN: 0
HEADACHES: 0
SHORTNESS OF BREATH: 0

## 2023-11-23 ASSESSMENT — LIFESTYLE VARIABLES: SUBSTANCE_ABUSE: 0

## 2023-11-23 ASSESSMENT — PAIN DESCRIPTION - PAIN TYPE
TYPE: ACUTE PAIN
TYPE: ACUTE PAIN

## 2023-11-23 NOTE — PROGRESS NOTES
[facility] Medicine Daily Progress Note    Date of Service  11/23/2023    Chief Complaint  Donte Horta is a 63 y.o. male admitted 11/20/2023 with dyspnea    Hospital Course  3 y.o. male who presented 11/20/2023 with history of hypertension cardiomyopathy EF 25-30% type 2 diabetes chronic kidney disease with prior biopsy revealing advanced diabetic glomerulosclerosis and history of noncompliance presented to the emergency department for evaluation of dyspnea and was noted to have worsening renal function and hyperkalemia K 8.0 .  He was bradycardic and received atropine and started on IV bicarb pushes and subsequent bicarb drip and Lokelma and diuretics and admitted to the intensive care unit.     The patient reports that he was getting low on his home medications and was stretching the doses so he does not run out.  He has not followed up with outpatient cardiology and nephrology due to transportation issues and concerns about cost.      Interval Problem Update  No acute events overnight.  K normal.  Patient is agreeable to starting dialysis, would prefer home dialysis if possible.  Nephrology notified, plan for starting hemodialysis now, can arrange home peritoneal dialysis as outpatient.  Will need permacath placed, ordered by nephrology team.  Outpatient dialysis chair will need to be set up prior to discharge.  Decrease lasix to 40 mg PO BID, titrate to euvolemia.  Will resume home coreg as heart failure appears improved, can slowly resume home medications as BP allows.    I have discussed this patient's plan of care and discharge plan at IDT rounds today with Case Management, Nursing, Nursing leadership, and other members of the IDT team.    Consultants/Specialty  critical care and nephrology    Code Status  DNAR/DNI    Disposition  The patient is not medically cleared for discharge to home or a post-acute facility.  Anticipate discharge to: home with close outpatient follow-up    I have placed the  appropriate orders for post-discharge needs.    Review of Systems  Review of Systems   Constitutional:  Positive for malaise/fatigue. Negative for chills and fever.   Eyes:  Negative for blurred vision and pain.   Respiratory:  Negative for cough and shortness of breath.    Cardiovascular:  Negative for chest pain, palpitations and leg swelling.   Gastrointestinal:  Negative for abdominal pain, nausea and vomiting.   Genitourinary:  Negative for dysuria and urgency.   Musculoskeletal:  Negative for back pain.   Skin:  Negative for itching and rash.   Neurological:  Negative for dizziness, sensory change, focal weakness and headaches.   Psychiatric/Behavioral:  Negative for substance abuse. The patient does not have insomnia.    All other systems reviewed and are negative.       Physical Exam  Temp:  [36.4 °C (97.5 °F)-36.8 °C (98.2 °F)] 36.7 °C (98 °F)  Pulse:  [66-77] 70  Resp:  [17-18] 18  BP: (138-148)/(66-77) 148/77  SpO2:  [88 %-95 %] 94 %    Physical Exam  Vitals and nursing note reviewed.   Constitutional:       Appearance: He is well-developed. He is not diaphoretic.   HENT:      Head: Normocephalic and atraumatic.      Mouth/Throat:      Pharynx: No oropharyngeal exudate.   Eyes:      General: No scleral icterus.        Right eye: No discharge.         Left eye: No discharge.      Conjunctiva/sclera: Conjunctivae normal.      Pupils: Pupils are equal, round, and reactive to light.   Neck:      Vascular: No JVD.      Trachea: No tracheal deviation.   Cardiovascular:      Rate and Rhythm: Normal rate and regular rhythm.      Heart sounds: No murmur heard.     No friction rub. No gallop.   Pulmonary:      Effort: Pulmonary effort is normal. No respiratory distress.      Breath sounds: No stridor. Decreased breath sounds present. No wheezing.   Chest:      Chest wall: No tenderness.   Abdominal:      General: Bowel sounds are normal. There is no distension.      Palpations: Abdomen is soft.      Tenderness:  There is no abdominal tenderness. There is no guarding or rebound.   Musculoskeletal:         General: No swelling or tenderness.      Cervical back: Neck supple.      Right lower leg: No edema.      Left lower leg: No edema.   Skin:     General: Skin is warm and dry.      Nails: There is no clubbing.   Neurological:      Mental Status: He is alert and oriented to person, place, and time.      Cranial Nerves: No cranial nerve deficit.      Sensory: No sensory deficit.      Motor: No weakness or abnormal muscle tone.   Psychiatric:         Behavior: Behavior normal.         Fluids    Intake/Output Summary (Last 24 hours) at 11/23/2023 1253  Last data filed at 11/23/2023 1136  Gross per 24 hour   Intake 120 ml   Output --   Net 120 ml       Laboratory  Recent Labs     11/22/23  0300 11/23/23  0230 11/23/23  1051   WBC 8.7 10.2 10.4   RBC 2.46* 2.65* 2.91*   HEMOGLOBIN 7.3* 7.8* 8.6*   HEMATOCRIT 21.1* 22.8* 25.3*   MCV 85.8 86.0 86.9   MCH 29.7 29.4 29.6   MCHC 34.6 34.2 34.0   RDW 45.9 45.5 46.1   PLATELETCT 223 257 281   MPV 10.7 11.5 10.4     Recent Labs     11/21/23  2200 11/22/23  0820 11/23/23  0230   SODIUM 133* 134* 131*   POTASSIUM 4.2 3.8 4.1   CHLORIDE 102 100 96   CO2 18* 21 22   GLUCOSE 147* 165* 121*   BUN 72* 67* 62*   CREATININE 7.62* 7.42* 7.35*   CALCIUM 7.8* 7.4* 7.1*                   Imaging  DX-CHEST-PORTABLE (1 VIEW)   Final Result      1.  Marked enlargement of cardiac silhouette      2.  Bilateral atelectasis      3.  Probable small right pleural effusion      4.  Hyperinflation consistent with chronic obstructive pulmonary disease      IR-MOELLER,GROSHONG PLACEMENT < 5    (Results Pending)        Assessment/Plan  * Hyperkalemia- (present on admission)  Assessment & Plan  Secondary to progression of end-stage renal disease  Hyperkalemia resolved discussed with nephrology bicarb drip and Lokelma discontinued     discussed with the patient that he has progressed to ESRD and that he will likely  need hemodialysis  Patient initially declining hemodialysis, he has changed his mind now and agrees to starting dialysis  No hospice at this time  Permacath ordered, will need outpatient dialysis chair set up as well prior to discharge    Advance care planning  Assessment & Plan  At this time patient is interested in hospice referral which has been ordered    Noncompliance  Assessment & Plan  Patient has not followed up with cardiology or nephrology as outpatient and was stretching his outpatient medications  Counseled on compliance with medical therapy        Hyponatremia  Assessment & Plan  Sodium 134    Monitor electrolytes    Metabolic acidosis  Assessment & Plan  Secondary to renal failure      Anemia due to chronic kidney disease  Assessment & Plan  No clinical signs of bleeding  Overnight drop likely dilutional    Hypertension- (present on admission)  Assessment & Plan  Increase hydralazine and monitor blood pressure    Acute renal failure superimposed on chronic kidney disease (HCC)  Assessment & Plan  Likely progressed to end-stage renal disease    Chronic systolic congestive heart failure (HCC)- (present on admission)  Assessment & Plan  Given hyperkalemia losartan and spironolactone were discontinued  Given bradycardia carvedilol was discontinued we will consider resuming beta-blocker if heart rate remains stable  Transition to PO lasix  Resume home coreg    Type 2 diabetes mellitus (HCC)- (present on admission)  Assessment & Plan  Continue insulin sliding scale and monitor CBGs   hemoglobin A1c 5.8         VTE prophylaxis:    heparin ppx

## 2023-11-23 NOTE — PROGRESS NOTES
"[facility] -  PROGRESS NOTE               Author: GAVIN Stroud Date & Time: 11/23/2023  8:48 AM     HPI:  Mr. 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 He reports having \"cold like symptoms\" with productive cough and SOB that he describes as inability to take a full breath worse on exertion and lying down flat. He denies chest pain, palpitations, headaches, profuse sweating, fevers or chills, etc.      He reported taking half the dose of his home meds for a few weeks prior to current hospitalization in order to make them last till his doctor appt. He restarted taking full doses of his meds including aldactone, losartan, amlodipine, atorva, and coreg on Saturday or Sunday which is when his sxs of SOB also seem to have started.     He was found to have hyperkalemia with K of 8, Scr of 8 similar to prior known Scr in 2021. Labs also showed HAGMA, with bicarb of 7-9 at admission. He refuses HD due to personal preferences. Hence hyperkalemia was managed medically and nephrology was consulted for management of hyperkalemia and CKD 5 which is progressing over the years.     DAILY NEPHROLOGY SUMMARY:  11/21: Initial consult done. K and metabolic acidosis improved with meds, sxs of SOB also improved with tx. Seen by palliative care for transition to hospice at discharge.   11/22: No overnight events. Weaning off O2. Hyperkalemia and HAGMA have resolved on labs this morning. Discontinued Lokelma (K <4) and bicarb (CO2 21).   11/23: pt had declined RRT and was leaning towards hospice but his family talked him into trying RRT, we discussed different options, he does live " "in [place] so is interested in home therapy, poor appetite, some nausea this am, + SOB with exertion, no edema     REVIEW OF SYSTEMS:    10 point ROS reviewed and is as per HPI/daily summary or otherwise negative    PMH/PSH/SH/FH:   Reviewed and unchanged since admission note    CURRENT MEDICATIONS:   Reviewed from admission to present day    VS:  BP (!) 148/77 Comment: Nurse aware   Pulse 70   Temp 36.7 °C (98 °F) (Temporal)   Resp 18   Ht 1.778 m (5' 10\")   Wt 106 kg (234 lb 5.6 oz)   SpO2 88%   BMI 33.63 kg/m²     Physical Exam  Vitals and nursing note reviewed.   Constitutional:       General: He is not in acute distress.     Appearance: Normal appearance. He is obese. He is not ill-appearing, toxic-appearing or diaphoretic.   HENT:      Head: Normocephalic and atraumatic.      Mouth/Throat:      Mouth: Mucous membranes are moist.      Pharynx: Oropharynx is clear.   Eyes:      General: No scleral icterus.        Right eye: No discharge.         Left eye: No discharge.      Extraocular Movements: Extraocular movements intact.      Conjunctiva/sclera: Conjunctivae normal.      Pupils: Pupils are equal, round, and reactive to light.   Cardiovascular:      Rate and Rhythm: Normal rate and regular rhythm.      Pulses: Normal pulses.      Heart sounds: Normal heart sounds. No murmur heard.     No friction rub. No gallop.      Comments: No JVD or hepatojugular reflex  Pulmonary:      Effort: Pulmonary effort is normal. No respiratory distress.      Breath sounds: Normal breath sounds. No wheezing, rhonchi or rales.   Chest:      Chest wall: No tenderness.   Abdominal:      General: Abdomen is flat. Bowel sounds are normal. There is no distension.      Palpations: Abdomen is soft. There is no mass.      Tenderness: There is no abdominal tenderness. There is no guarding or rebound.   Musculoskeletal:      Cervical back: Normal range of motion and neck supple.      Right lower leg: Edema (1+ till the ankles) " present.      Left lower leg: Edema (1+ till the ankles) present.      Comments: B/L 1+ pitting pedal edema noted till the ankles, symmetric bilaterally.    Skin:     General: Skin is warm.      Capillary Refill: Capillary refill takes less than 2 seconds.      Coloration: Skin is not jaundiced.   Neurological:      General: No focal deficit present.      Mental Status: He is alert and oriented to person, place, and time. Mental status is at baseline.      Cranial Nerves: No cranial nerve deficit.   Psychiatric:         Mood and Affect: Mood normal.         Behavior: Behavior normal.         Thought Content: Thought content normal.         Judgment: Judgment normal.         Fluids:  In: 2638.1 [P.O.:240; I.V.:2398.1]  Out: 475     LABS:  Recent Labs     11/21/23  2200 11/22/23  0820 11/23/23  0230   SODIUM 133* 134* 131*   POTASSIUM 4.2 3.8 4.1   CHLORIDE 102 100 96   CO2 18* 21 22   GLUCOSE 147* 165* 121*   BUN 72* 67* 62*   CREATININE 7.62* 7.42* 7.35*   CALCIUM 7.8* 7.4* 7.1*         IMAGING:  Reviewed results of the CXR. On my personal review, the cardiac silhouette appears slightly enlarged with blunting of b/l costophrenic angles, worse on the left side.      IMPRESSION:     # Hyperkalemia, Resolved  - Likely secondary to renal insufficiency and home meds.   - K of 8 at admission, now down to 3.8-4.2 on most recent labs.  - Declined HD at admission, hence medically managed. This admission, he has received IV Ca chloride, albuterol neb, IV lasix 80 BID, sodium bicarb 100mEq and then continuous infusion, and lokelma 10g daily.     # HAGMA,  Resolved  - Appropriately compensated respiratory alkalosis.   - Resolved with sodium bicarb infusion, which was discontinued on 11/22.      # Sinus bradycardia ,  Resolved  - Likely secondary to hyperkalemia. Resolved with improvement in K levels.   # Type 2 diabetes mellitus with diabetic nephropathy  # CKD stage 5  - SCr 8.07 this admission, similar to last known baseline  SCr from 2021  - Renal biopsy in 08/2021 showed advanced diabetic glomerulosclerosis class IV  - originally declines RRT, has several family members that has been on, after his discussion with family he is now agreeable to RRT, interested in home therapy   # Hypertension, controlled   - Goal BP < 140/90  - lasix, hydralazine     # Anemia of CKD, sub-optimal goal  - Goal Hgb 10-11  - check iron stores  - start RANDY with iHD   # BMD-CKD   - Ca+: 7.1, check PTH, vit D,      PLAN:  Pt now agreeable to RRT  Discussed modality options and is interested in home therapy, will work on as outpt and will notify [facility] for home eval   Will get Permcath placed and initiate hemodialysis x 3 days  Refer for AVF/PD cath as outpt   Check iron stores, PTH, vit D, phos  Avoid ARBs/ACEi for now until start hemodialysis  Renal diet  Avoid NSAIDs, Nephrotoxins  Will need outpt chair set up prior to discharge

## 2023-11-23 NOTE — HOSPITAL COURSE
This is a 63-year-old male with past medical history of cardiomyopathy with LVEF of 25%, hypertension, dyslipidemia, type 2 diabetes A1C 5.8, CKD significant for diabetic glomerulosclerosis and noncompliance who was admitted on 11/20/2023 with acute hypoxemic respiratory failure, hyperkalemia and LEANNA on CKD.    On admission, patient was bradycardic requiring atropine, bicarb, bicarb drip, Lokelma, diuretics and ICU admission.  Initially patient was not amenable to hemodialysis, ultimately he decided to continue HD.  Right IJ TDC was placed on 11/24, continuing with TTS schedule outpatient.  QuantiFERON gold negative.  Home O2 evaluation was performed, patient does not require oxygen at rest or on exertion.

## 2023-11-23 NOTE — PROGRESS NOTES
[facility] Medicine Daily Progress Note    Date of Service  11/22/2023    Chief Complaint  Donte Horta is a 63 y.o. male admitted 11/20/2023 with dyspnea    Hospital Course  3 y.o. male who presented 11/20/2023 with history of hypertension cardiomyopathy EF 25-30% type 2 diabetes chronic kidney disease with prior biopsy revealing advanced diabetic glomerulosclerosis and history of noncompliance presented to the emergency department for evaluation of dyspnea and was noted to have worsening renal function and hyperkalemia K 8.0 .  He was bradycardic and received atropine and started on IV bicarb pushes and subsequent bicarb drip and Lokelma and diuretics and admitted to the intensive care unit.     The patient reports that he was getting low on his home medications and was stretching the doses so he does not run out.  He has not followed up with outpatient cardiology and nephrology due to transportation issues and concerns about cost.      Interval Problem Update    Patient is afebrile 1 L nasal cannula  Denies chest pain or dyspnea  Reviewed BMP potassium 3.8 BUN 67 creatinine 7.4  Hemoglobin A1c 5.8  Reviewed CBC hemoglobin 7.3    I had a long discussion with the patient regarding treatment options and goals of care he does not wish to pursue hemodialysis and wants to consider hospice referral    Addendum: After discussing with his family the patient would like to reconsider his decision about going home on hospice and discuss hemodialysis.  I updated nephrology and ordered repeat chemistry panel and CBC    I have discussed this patient's plan of care and discharge plan at IDT rounds today with Case Management, Nursing, Nursing leadership, and other members of the IDT team.    Consultants/Specialty  critical care and nephrology    Code Status  DNAR/DNI    Disposition    Anticipate discharge to: home with close outpatient follow-up    I have placed the appropriate orders for post-discharge needs.    Review of  Systems  Review of Systems   Constitutional:  Positive for malaise/fatigue. Negative for fever.   Respiratory:  Negative for shortness of breath.    Cardiovascular:  Positive for leg swelling. Negative for chest pain and palpitations.   All other systems reviewed and are negative.       Physical Exam  Temp:  [36.6 °C (97.8 °F)-37.1 °C (98.8 °F)] 36.6 °C (97.8 °F)  Pulse:  [63-77] 77  Resp:  [16-24] 17  BP: (139-164)/(63-75) 145/75  SpO2:  [90 %-94 %] 92 %    Physical Exam  Vitals and nursing note reviewed.   Constitutional:       Appearance: He is well-developed. He is not diaphoretic.   HENT:      Head: Normocephalic and atraumatic.      Mouth/Throat:      Pharynx: No oropharyngeal exudate.   Eyes:      General: No scleral icterus.        Right eye: No discharge.         Left eye: No discharge.      Conjunctiva/sclera: Conjunctivae normal.      Pupils: Pupils are equal, round, and reactive to light.   Neck:      Vascular: No JVD.      Trachea: No tracheal deviation.   Cardiovascular:      Rate and Rhythm: Normal rate and regular rhythm.      Heart sounds: No murmur heard.     No friction rub. No gallop.   Pulmonary:      Effort: Pulmonary effort is normal. No respiratory distress.      Breath sounds: No stridor. Decreased breath sounds present. No wheezing.   Chest:      Chest wall: No tenderness.   Abdominal:      General: Bowel sounds are normal. There is no distension.      Palpations: Abdomen is soft.      Tenderness: There is no abdominal tenderness. There is no rebound.   Musculoskeletal:         General: Swelling present. No tenderness.      Cervical back: Neck supple.   Skin:     General: Skin is warm and dry.      Nails: There is no clubbing.   Neurological:      Mental Status: He is alert and oriented to person, place, and time.      Cranial Nerves: No cranial nerve deficit.      Motor: No abnormal muscle tone.   Psychiatric:         Behavior: Behavior normal.         Fluids    Intake/Output Summary (Last  24 hours) at 11/22/2023 1648  Last data filed at 11/22/2023 0800  Gross per 24 hour   Intake 2738.13 ml   Output 2665 ml   Net 73.13 ml       Laboratory  Recent Labs     11/20/23  2323 11/22/23  0300   WBC 7.4 8.7   RBC 2.77* 2.46*   HEMOGLOBIN 8.3* 7.3*   HEMATOCRIT 25.3* 21.1*   MCV 91.3 85.8   MCH 30.0 29.7   MCHC 32.8 34.6   RDW 50.8* 45.9   PLATELETCT 245 223   MPV 10.9 10.7     Recent Labs     11/21/23  1210 11/21/23  2200 11/22/23  0820   SODIUM 133* 133* 134*   POTASSIUM 5.2 4.2 3.8   CHLORIDE 105 102 100   CO2 13* 18* 21   GLUCOSE 143* 147* 165*   BUN 74* 72* 67*   CREATININE 7.74* 7.62* 7.42*   CALCIUM 8.4* 7.8* 7.4*                   Imaging  DX-CHEST-PORTABLE (1 VIEW)   Final Result      1.  Marked enlargement of cardiac silhouette      2.  Bilateral atelectasis      3.  Probable small right pleural effusion      4.  Hyperinflation consistent with chronic obstructive pulmonary disease           Assessment/Plan  * Hyperkalemia- (present on admission)  Assessment & Plan  Secondary to progression of end-stage renal disease  Hyperkalemia resolved discussed with nephrology bicarb drip and Lokelma discontinued     discussed with the patient that he has progressed to ESRD and that he will likely need hemodialysis  He has also discussed with nephrology and at this time is declining hemodialysis    Hospice referral ordered    Advance care planning  Assessment & Plan  At this time patient is interested in hospice referral which has been ordered    Noncompliance  Assessment & Plan  Patient has not followed up with cardiology or nephrology as outpatient and was stretching his outpatient medications  Counseled on compliance with medical therapy        Hyponatremia  Assessment & Plan  Sodium 134    Monitor electrolytes    Metabolic acidosis  Assessment & Plan  Secondary to renal failure      Anemia due to chronic kidney disease  Assessment & Plan  No clinical signs of bleeding  Overnight drop likely  dilutional    Hypertension- (present on admission)  Assessment & Plan  Increase hydralazine and monitor blood pressure    Acute renal failure superimposed on chronic kidney disease (HCC)  Assessment & Plan  Likely progressed to end-stage renal disease    Chronic systolic congestive heart failure (HCC)- (present on admission)  Assessment & Plan  Given hyperkalemia losartan and spironolactone were discontinued  Given bradycardia carvedilol was discontinued we will consider resuming beta-blocker if heart rate remains stable  Continue IV Lasix    Type 2 diabetes mellitus (HCC)- (present on admission)  Assessment & Plan  Continue insulin sliding scale and monitor CBGs   hemoglobin A1c 5.8         VTE prophylaxis:    heparin ppx      I have performed a physical exam and reviewed and updated ROS and Plan today (11/22/2023). In review of yesterday's note (11/21/2023), there are no changes except as documented above.

## 2023-11-23 NOTE — CARE PLAN
Problem: Knowledge Deficit - Standard  Goal: Patient and family/care givers will demonstrate understanding of plan of care, disease process/condition, diagnostic tests and medications  Outcome: Progressing     Problem: Skin Integrity  Goal: Skin integrity is maintained or improved  Outcome: Progressing     Problem: Fall Risk  Goal: Patient will remain free from falls  Outcome: Progressing     Problem: Psychosocial Needs:  Goal: Ability to cope will improve  Outcome: Progressing     Problem: Urinary Elimination:  Goal: Ability to achieve and maintain adequate renal perfusion and functioning will improve  Outcome: Progressing  Goal: Ability to achieve a balanced intake and output will improve  Outcome: Progressing     Problem: Physical Regulation:  Goal: Diagnostic test results will improve  Outcome: Progressing     Problem: Knowledge Deficit:  Goal: Patient's knowledge of the prescribed therapeutic regimen will improve  Outcome: Progressing     Problem: Psychosocial  Goal: Patient's level of anxiety will decrease  Outcome: Progressing  Goal: Patient's ability to verbalize feelings about condition will improve  Outcome: Progressing  Goal: Patient's ability to re-evaluate and adapt role responsibilities will improve  Outcome: Progressing  Goal: Patient and family will demonstrate ability to cope with life altering diagnosis and/or procedure  Outcome: Progressing  Goal: Spiritual and cultural needs incorporated into hospitalization  Outcome: Progressing     Problem: Communication  Goal: The ability to communicate needs accurately and effectively will improve  Outcome: Progressing     Problem: Discharge Barriers/Planning  Goal: Patient's continuum of care needs are met  Outcome: Progressing     Problem: Hemodynamics  Goal: Patient's hemodynamics, fluid balance and neurologic status will be stable or improve  Outcome: Progressing     Problem: Respiratory  Goal: Patient will achieve/maintain optimum respiratory  ventilation and gas exchange  Outcome: Progressing     Problem: Chest Tube Management  Goal: Complications related to chest tube will be avoided or minimized  Outcome: Progressing     Problem: Fluid Volume  Goal: Fluid volume balance will be maintained  Outcome: Progressing     Problem: Mechanical Ventilation  Goal: Safe management of artificial airway and ventilation  Outcome: Progressing  Goal: Successful weaning off mechanical ventilator, spontaneously maintains adequate gas exchange  Outcome: Progressing  Goal: Patient will be able to express needs and understand communication  Outcome: Progressing     Problem: Dysphagia  Goal: Dysphagia will improve  Outcome: Progressing     Problem: Risk for Aspiration  Goal: Patient's risk for aspiration will be absent or decrease  Outcome: Progressing     Problem: Nutrition  Goal: Patient's nutritional and fluid intake will be adequate or improve  Outcome: Progressing  Goal: Enteral nutrition will be maintained or improve  Outcome: Progressing  Goal: Enteral nutrition will be maintained or improve  Outcome: Progressing     Problem: Urinary Elimination  Goal: Establish and maintain regular urinary output  Outcome: Progressing     Problem: Bowel Elimination  Goal: Establish and maintain regular bowel function  Outcome: Progressing     Problem: Gastrointestinal Irritability  Goal: Nausea and vomiting will be absent or improve  Outcome: Progressing  Goal: Diarrhea will be absent or improved  Outcome: Progressing     Problem: Rectal Tube  Goal: Fecal output will be contained and skin will remain free from irritation  Outcome: Progressing     Problem: Mobility  Goal: Patient's capacity to carry out activities will improve  Outcome: Progressing     Problem: Self Care  Goal: Patient will have the ability to perform ADLs independently or with assistance (bathe, groom, dress, toilet and feed)  Outcome: Progressing     Problem: Infection - Standard  Goal: Patient will remain free from  infection  Outcome: Progressing     Problem: Wound/ / Incision Healing  Goal: Patient's wound/surgical incision will decrease in size and heals properly  Outcome: Progressing   The patient is Stable - Low risk of patient condition declining or worsening    Shift Goals  Clinical Goals: Remain pain free/comfortable  Patient Goals: rest, go home  Family Goals: unable to assess    Progress made toward(s) clinical / shift goals:  pt progressing toward goals    Patient is not progressing towards the following goals:

## 2023-11-23 NOTE — PROGRESS NOTES
Inpatient Palliative Medicine - Sign Off Note     Donte Horta  63 y.o. male  [id number]    Location: [facility]  PCP:Anmol Mccray M.D.  Referral Source: Miguel Bourgeois M.d.    Reason for palliative medicine consultation and/or visit: ACP         Assessment and Plan:     GOAL(S) OF CARE = LONGEVITY, to survive long enough for a renal transplant from 1 child    SYMPTOMS ETIOLOGY/CAUSES = nausea & vomit, fatigue secondary to ESRD & CHF & associated metabolic derangement.    PROGNOSIS = no longer hospice-eligible at this point, due to clarified intent to seek dialysis and renal transplant     CODE STATUS = DNAR DNI  11/23/2023 POLST completed - DNAR, SELECTIVE TREATMENTS, trial of artificial nutrients      PALLIATIVE CARE TEAM INTERVENTIONS =   Medical updates.  Clarified GOC = longevity via dialysis and (hopeful) renal transplant from 1 of patient's children.  Offered & completed a POLST form for filing.  Emotional support & therapeutic presence  Will sign off for now, as GOC is clear and symptoms controlled and non-distressing. Please reconsult later when new needs arise.      Summary:   Donte Horta is a 63 y.o. with ESRD and HFrEF EF=25-30%, and history of LV thrombus sp anticoagulation who presented to ED 11/20/2023 with worsening SOB. Patient was also found bradycardic and urgently treated with atropine. He has been declinig dialysis so far. Palliative care is consulted for GOC clarification.   -----------------------------------------------------------------------------------------------------------------    11/23/2023  Patient has since changed his mind during the interval. A daughter and a son of his both expressed willingness to donate him a kidney for transplant (if compatible), and patient now rightfully seeks dialysis to extend life in anticipation.  Patient himself still feels somewhat apprehensive about the potential burdens/side effects of dialysis, but he has also found determination to  "\"stick around & see his grand kids get through school.\"    He raised questions about the prerequisites of renal transplant. I encouraged him to discuss transplant nephrology referral with his nephrology team.    Confirmed his code status again. I offered him a POLST form to complete for filing, to which he was amenable. POLST education provided and the form itself completed and signed. Patient was provided the original POLST and extra copies.    Symptoms remained non-distressing.  Patient remained overall clinically stable.  PC team will sign off at this time. Please reconsult again later when new needs arise.        11/21/2023  [employer] MS4 Erin Rodrigo and myself met patient bedside, together with his daughter Lala Pina present. Introduced palliative care service and purpose of this consultation. Patient was amenable to proceed with this interview.    He endorsed some emesis earlier today which has improved somewhat, and he was not actively heaving. Patient denied active pain nor dyspnea nor significant fatigue, though he noted he was dozing off more frequently.  Patient did request something to help him sleep better at night, and he was agreeable to try PRN temazepam. Order placed.    Patient adamantly declines dialysis. His mother and a brother both unfortunately had to use dialysis in the final phase of their lives, and patient witnessed much burden and suffering from in their final days. \"Dialysis helped my mom live longer, but she was always sick and going in and out of hospital all the time...  I don't want that, not for my kids to see either,\" he told us.    Patient has lived with CHF for years, and his kidney function unfortunately declined last 3-4 years in part contributed by the many heart medications he has had to use over the years.  He has been following with nephrology since 2021, yet has always maintained his own inclination against dialysis.    Patient lives with his wife Leydi at home, who is " his main caregiver.Lala lives in [place] with her own family.     We talked about hospice care after medical optimization & discharge home, and patient was agreeable. Patient is familiar with hospice care, as his mother-in-law passed away under hospice care last year. He has no preferred hospice agency at this time. He is amenable to the concept of living his life naturally, and dying naturally.    Hospice referral placed, for discharge after medical optimization. No further unaddressed concerns nor complaints at this time.        Advance care planning:  The patient and/or legal decision maker has provided voluntary consent to discuss advance care planning. We discussed code status.  DNAR DNI    Thank you for allowing me the opportunity to participate in the care of Donte Horta    I spent a total of 35 minutes reviewing medical records, direct face-to-face time with the patient and/or family, documentation and coordination of care. This is separate from the time spent on advance care planning, which is documented above.               DO Lio MAKI ([facility]   [address] [place]  [id number]  P: [phone]  F: [phone]  C: [phone]

## 2023-11-24 ENCOUNTER — APPOINTMENT (OUTPATIENT)
Dept: RADIOLOGY | Facility: MEDICAL CENTER | Age: 63
DRG: 640 | End: 2023-11-24
Attending: NURSE PRACTITIONER
Payer: MEDICAID

## 2023-11-24 LAB
25(OH)D3 SERPL-MCNC: 19 NG/ML (ref 30–100)
ANION GAP SERPL CALC-SCNC: 11 MMOL/L (ref 7–16)
BUN SERPL-MCNC: 60 MG/DL (ref 8–22)
CA-I SERPL-SCNC: 0.9 MMOL/L (ref 1.1–1.3)
CALCIUM SERPL-MCNC: 6.9 MG/DL (ref 8.5–10.5)
CHLORIDE SERPL-SCNC: 96 MMOL/L (ref 96–112)
CO2 SERPL-SCNC: 24 MMOL/L (ref 20–33)
CREAT SERPL-MCNC: 7.01 MG/DL (ref 0.5–1.4)
GFR SERPLBLD CREATININE-BSD FMLA CKD-EPI: 8 ML/MIN/1.73 M 2
GLUCOSE BLD STRIP.AUTO-MCNC: 113 MG/DL (ref 65–99)
GLUCOSE BLD STRIP.AUTO-MCNC: 115 MG/DL (ref 65–99)
GLUCOSE BLD STRIP.AUTO-MCNC: 189 MG/DL (ref 65–99)
GLUCOSE BLD STRIP.AUTO-MCNC: 223 MG/DL (ref 65–99)
GLUCOSE SERPL-MCNC: 116 MG/DL (ref 65–99)
HAV IGM SERPL QL IA: NORMAL
HBV CORE IGM SER QL: NORMAL
HBV SURFACE AB SERPL IA-ACNC: <3.5 MIU/ML (ref 0–10)
HBV SURFACE AG SER QL: NORMAL
HCV AB SER QL: NORMAL
PHOSPHATE SERPL-MCNC: 3.9 MG/DL (ref 2.5–4.5)
POTASSIUM SERPL-SCNC: 4.2 MMOL/L (ref 3.6–5.5)
PTH-INTACT SERPL-MCNC: 223 PG/ML (ref 14–72)
SODIUM SERPL-SCNC: 131 MMOL/L (ref 135–145)

## 2023-11-24 PROCEDURE — 700102 HCHG RX REV CODE 250 W/ 637 OVERRIDE(OP): Performed by: STUDENT IN AN ORGANIZED HEALTH CARE EDUCATION/TRAINING PROGRAM

## 2023-11-24 PROCEDURE — 99231 SBSQ HOSP IP/OBS SF/LOW 25: CPT | Performed by: STUDENT IN AN ORGANIZED HEALTH CARE EDUCATION/TRAINING PROGRAM

## 2023-11-24 PROCEDURE — 80074 ACUTE HEPATITIS PANEL: CPT

## 2023-11-24 PROCEDURE — 700105 HCHG RX REV CODE 258: Performed by: RADIOLOGY

## 2023-11-24 PROCEDURE — 700111 HCHG RX REV CODE 636 W/ 250 OVERRIDE (IP)

## 2023-11-24 PROCEDURE — 86706 HEP B SURFACE ANTIBODY: CPT

## 2023-11-24 PROCEDURE — 700102 HCHG RX REV CODE 250 W/ 637 OVERRIDE(OP): Performed by: NURSE PRACTITIONER

## 2023-11-24 PROCEDURE — 82962 GLUCOSE BLOOD TEST: CPT

## 2023-11-24 PROCEDURE — 82330 ASSAY OF CALCIUM: CPT

## 2023-11-24 PROCEDURE — 36558 INSERT TUNNELED CV CATH: CPT

## 2023-11-24 PROCEDURE — 02H633Z INSERTION OF INFUSION DEVICE INTO RIGHT ATRIUM, PERCUTANEOUS APPROACH: ICD-10-PCS | Performed by: RADIOLOGY

## 2023-11-24 PROCEDURE — A9270 NON-COVERED ITEM OR SERVICE: HCPCS | Performed by: NURSE PRACTITIONER

## 2023-11-24 PROCEDURE — 80048 BASIC METABOLIC PNL TOTAL CA: CPT

## 2023-11-24 PROCEDURE — 84100 ASSAY OF PHOSPHORUS: CPT

## 2023-11-24 PROCEDURE — 700101 HCHG RX REV CODE 250

## 2023-11-24 PROCEDURE — A9270 NON-COVERED ITEM OR SERVICE: HCPCS | Performed by: HOSPITALIST

## 2023-11-24 PROCEDURE — 700111 HCHG RX REV CODE 636 W/ 250 OVERRIDE (IP): Mod: JZ | Performed by: NURSE PRACTITIONER

## 2023-11-24 PROCEDURE — 0JH63XZ INSERTION OF TUNNELED VASCULAR ACCESS DEVICE INTO CHEST SUBCUTANEOUS TISSUE AND FASCIA, PERCUTANEOUS APPROACH: ICD-10-PCS | Performed by: RADIOLOGY

## 2023-11-24 PROCEDURE — 700102 HCHG RX REV CODE 250 W/ 637 OVERRIDE(OP): Performed by: HOSPITALIST

## 2023-11-24 PROCEDURE — 82306 VITAMIN D 25 HYDROXY: CPT

## 2023-11-24 PROCEDURE — 700111 HCHG RX REV CODE 636 W/ 250 OVERRIDE (IP): Mod: JZ | Performed by: RADIOLOGY

## 2023-11-24 PROCEDURE — A9270 NON-COVERED ITEM OR SERVICE: HCPCS | Performed by: STUDENT IN AN ORGANIZED HEALTH CARE EDUCATION/TRAINING PROGRAM

## 2023-11-24 PROCEDURE — 770006 HCHG ROOM/CARE - MED/SURG/GYN SEMI*

## 2023-11-24 PROCEDURE — 83970 ASSAY OF PARATHORMONE: CPT

## 2023-11-24 PROCEDURE — 36415 COLL VENOUS BLD VENIPUNCTURE: CPT

## 2023-11-24 PROCEDURE — 700105 HCHG RX REV CODE 258: Performed by: NURSE PRACTITIONER

## 2023-11-24 RX ORDER — MIDAZOLAM HYDROCHLORIDE 1 MG/ML
INJECTION INTRAMUSCULAR; INTRAVENOUS
Status: COMPLETED
Start: 2023-11-24 | End: 2023-11-24

## 2023-11-24 RX ORDER — LIDOCAINE HYDROCHLORIDE AND EPINEPHRINE BITARTRATE 20; .01 MG/ML; MG/ML
INJECTION, SOLUTION SUBCUTANEOUS
Status: COMPLETED
Start: 2023-11-24 | End: 2023-11-24

## 2023-11-24 RX ORDER — CEFAZOLIN SODIUM 1 G/3ML
INJECTION, POWDER, FOR SOLUTION INTRAMUSCULAR; INTRAVENOUS
Status: COMPLETED
Start: 2023-11-24 | End: 2023-11-24

## 2023-11-24 RX ORDER — HEPARIN SODIUM 1000 [USP'U]/ML
INJECTION, SOLUTION INTRAVENOUS; SUBCUTANEOUS
Status: COMPLETED
Start: 2023-11-24 | End: 2023-11-24

## 2023-11-24 RX ORDER — ONDANSETRON 2 MG/ML
4 INJECTION INTRAMUSCULAR; INTRAVENOUS PRN
Status: ACTIVE | OUTPATIENT
Start: 2023-11-24 | End: 2023-11-24

## 2023-11-24 RX ORDER — SODIUM CHLORIDE 9 MG/ML
500 INJECTION, SOLUTION INTRAVENOUS
Status: ACTIVE | OUTPATIENT
Start: 2023-11-24 | End: 2023-11-24

## 2023-11-24 RX ORDER — CALCIUM GLUCONATE 20 MG/ML
2 INJECTION, SOLUTION INTRAVENOUS ONCE
Status: COMPLETED | OUTPATIENT
Start: 2023-11-24 | End: 2023-11-24

## 2023-11-24 RX ORDER — ERGOCALCIFEROL 1.25 MG/1
50000 CAPSULE ORAL
Status: DISCONTINUED | OUTPATIENT
Start: 2023-11-24 | End: 2023-11-30 | Stop reason: HOSPADM

## 2023-11-24 RX ORDER — MIDAZOLAM HYDROCHLORIDE 1 MG/ML
.5-2 INJECTION INTRAMUSCULAR; INTRAVENOUS PRN
Status: ACTIVE | OUTPATIENT
Start: 2023-11-24 | End: 2023-11-24

## 2023-11-24 RX ADMIN — INSULIN LISPRO 2 UNITS: 100 INJECTION, SOLUTION INTRAVENOUS; SUBCUTANEOUS at 21:17

## 2023-11-24 RX ADMIN — HEPARIN SODIUM 5000 UNITS: 5000 INJECTION, SOLUTION INTRAVENOUS; SUBCUTANEOUS at 04:05

## 2023-11-24 RX ADMIN — HEPARIN SODIUM 5000 UNITS: 5000 INJECTION, SOLUTION INTRAVENOUS; SUBCUTANEOUS at 15:46

## 2023-11-24 RX ADMIN — HEPARIN SODIUM: 1000 INJECTION, SOLUTION INTRAVENOUS; SUBCUTANEOUS at 18:09

## 2023-11-24 RX ADMIN — CARVEDILOL 25 MG: 25 TABLET, FILM COATED ORAL at 07:37

## 2023-11-24 RX ADMIN — HYDRALAZINE HYDROCHLORIDE 25 MG: 50 TABLET, FILM COATED ORAL at 04:05

## 2023-11-24 RX ADMIN — SODIUM CHLORIDE 250 MG: 9 INJECTION, SOLUTION INTRAVENOUS at 17:05

## 2023-11-24 RX ADMIN — HYDRALAZINE HYDROCHLORIDE 25 MG: 50 TABLET, FILM COATED ORAL at 21:11

## 2023-11-24 RX ADMIN — FUROSEMIDE 40 MG: 40 TABLET ORAL at 04:05

## 2023-11-24 RX ADMIN — LIDOCAINE HYDROCHLORIDE,EPINEPHRINE BITARTRATE: 20; .01 INJECTION, SOLUTION INFILTRATION; PERINEURAL at 18:10

## 2023-11-24 RX ADMIN — CARVEDILOL 25 MG: 25 TABLET, FILM COATED ORAL at 15:46

## 2023-11-24 RX ADMIN — MIDAZOLAM HYDROCHLORIDE 1 MG: 1 INJECTION INTRAMUSCULAR; INTRAVENOUS at 18:02

## 2023-11-24 RX ADMIN — MIDAZOLAM HYDROCHLORIDE 1 MG: 1 INJECTION, SOLUTION INTRAMUSCULAR; INTRAVENOUS at 18:02

## 2023-11-24 RX ADMIN — CALCIUM GLUCONATE 2 G: 20 INJECTION, SOLUTION INTRAVENOUS at 03:54

## 2023-11-24 RX ADMIN — FUROSEMIDE 40 MG: 40 TABLET ORAL at 15:46

## 2023-11-24 RX ADMIN — CEFAZOLIN 2 G: 1 INJECTION, POWDER, FOR SOLUTION INTRAMUSCULAR; INTRAVENOUS at 18:00

## 2023-11-24 RX ADMIN — SENNOSIDES AND DOCUSATE SODIUM 2 TABLET: 50; 8.6 TABLET ORAL at 04:05

## 2023-11-24 RX ADMIN — HYDRALAZINE HYDROCHLORIDE 25 MG: 50 TABLET, FILM COATED ORAL at 13:55

## 2023-11-24 RX ADMIN — FENTANYL CITRATE 25 MCG: 50 INJECTION, SOLUTION INTRAMUSCULAR; INTRAVENOUS at 18:02

## 2023-11-24 RX ADMIN — ERGOCALCIFEROL 50000 UNITS: 1.25 CAPSULE ORAL at 17:05

## 2023-11-24 ASSESSMENT — ENCOUNTER SYMPTOMS
BLURRED VISION: 0
VOMITING: 0
COUGH: 0
BACK PAIN: 0
INSOMNIA: 0
SENSORY CHANGE: 0
EYE PAIN: 0
DIZZINESS: 0
SHORTNESS OF BREATH: 0
FEVER: 0
HEADACHES: 0
CHILLS: 0
PALPITATIONS: 0
ABDOMINAL PAIN: 0
FOCAL WEAKNESS: 0
NAUSEA: 0

## 2023-11-24 ASSESSMENT — LIFESTYLE VARIABLES: SUBSTANCE_ABUSE: 0

## 2023-11-24 ASSESSMENT — PAIN DESCRIPTION - PAIN TYPE: TYPE: ACUTE PAIN

## 2023-11-24 NOTE — PROGRESS NOTES
"[facility] -  PROGRESS NOTE               Author: GAVIN Vines Date & Time: 11/24/2023  1:52 PM     HPI:  Mr. 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 He reports having \"cold like symptoms\" with productive cough and SOB that he describes as inability to take a full breath worse on exertion and lying down flat. He denies chest pain, palpitations, headaches, profuse sweating, fevers or chills, etc.      He reported taking half the dose of his home meds for a few weeks prior to current hospitalization in order to make them last till his doctor appt. He restarted taking full doses of his meds including aldactone, losartan, amlodipine, atorva, and coreg on Saturday or Sunday which is when his sxs of SOB also seem to have started.     He was found to have hyperkalemia with K of 8, Scr of 8 similar to prior known Scr in 2021. Labs also showed HAGMA, with bicarb of 7-9 at admission. He refuses HD due to personal preferences. Hence hyperkalemia was managed medically and nephrology was consulted for management of hyperkalemia and CKD 5 which is progressing over the years.     DAILY NEPHROLOGY SUMMARY:  11/21: Initial consult done. K and metabolic acidosis improved with meds, sxs of SOB also improved with tx. Seen by palliative care for transition to hospice at discharge.   11/22: No overnight events. Weaning off O2. Hyperkalemia and HAGMA have resolved on labs this morning. Discontinued Lokelma (K <4) and bicarb (CO2 21).   11/23: pt had declined RRT and was leaning towards hospice but his family talked him into trying RRT, we discussed different options, " "he does live in [place] so is interested in home therapy, poor appetite, some nausea this am, + SOB with exertion, no edema   11/24: Feeling well without CP SOB or worsening edema.  Getting permcath today.  VSS.  Labs unchanged    REVIEW OF SYSTEMS:    10 point ROS reviewed and is as per HPI/daily summary or otherwise negative    PMH/PSH/SH/FH:   Reviewed and unchanged since admission note    CURRENT MEDICATIONS:   Reviewed from admission to present day    VS:  /74   Pulse 71   Temp 36.8 °C (98.3 °F) (Temporal)   Resp 18   Ht 1.778 m (5' 10\")   Wt 104 kg (228 lb 2.8 oz)   SpO2 91%   BMI 32.74 kg/m²     Physical Exam  Vitals and nursing note reviewed.   Constitutional:       General: He is not in acute distress.     Appearance: Normal appearance. He is obese. He is not ill-appearing, toxic-appearing or diaphoretic.   HENT:      Head: Normocephalic and atraumatic.      Mouth/Throat:      Mouth: Mucous membranes are moist.      Pharynx: Oropharynx is clear.   Eyes:      General: No scleral icterus.        Right eye: No discharge.         Left eye: No discharge.      Extraocular Movements: Extraocular movements intact.      Conjunctiva/sclera: Conjunctivae normal.      Pupils: Pupils are equal, round, and reactive to light.   Cardiovascular:      Rate and Rhythm: Normal rate and regular rhythm.      Pulses: Normal pulses.      Heart sounds: Normal heart sounds. No murmur heard.     No friction rub. No gallop.      Comments: No JVD or hepatojugular reflex  Pulmonary:      Effort: Pulmonary effort is normal. No respiratory distress.      Breath sounds: Normal breath sounds. No wheezing, rhonchi or rales.   Chest:      Chest wall: No tenderness.   Abdominal:      General: Abdomen is flat. Bowel sounds are normal. There is no distension.      Palpations: Abdomen is soft. There is no mass.      Tenderness: There is no abdominal tenderness. There is no guarding or rebound.   Musculoskeletal:      Cervical back: " Normal range of motion and neck supple.      Right lower leg: Edema (1+ till the ankles) present.      Left lower leg: Edema (1+ till the ankles) present.      Comments: B/L 1+ pitting pedal edema noted till the ankles, symmetric bilaterally.    Skin:     General: Skin is warm.      Capillary Refill: Capillary refill takes less than 2 seconds.      Coloration: Skin is not jaundiced.   Neurological:      General: No focal deficit present.      Mental Status: He is alert and oriented to person, place, and time. Mental status is at baseline.      Cranial Nerves: No cranial nerve deficit.   Psychiatric:         Mood and Affect: Mood normal.         Behavior: Behavior normal.         Thought Content: Thought content normal.         Judgment: Judgment normal.         Fluids:  In: 240 [P.O.:240]  Out: -     LABS:  Recent Labs     11/22/23  0820 11/23/23  0230 11/24/23  0218   SODIUM 134* 131* 131*   POTASSIUM 3.8 4.1 4.2   CHLORIDE 100 96 96   CO2 21 22 24   GLUCOSE 165* 121* 116*   BUN 67* 62* 60*   CREATININE 7.42* 7.35* 7.01*   CALCIUM 7.4* 7.1* 6.9*         IMAGING:  Reviewed results of the CXR. On my personal review, the cardiac silhouette appears slightly enlarged with blunting of b/l costophrenic angles, worse on the left side.      IMPRESSION:     # Hyperkalemia, Resolved  - Likely secondary to renal insufficiency and home meds.   - K of 8 at admission, now down to 3.8-4.2 on most recent labs.  - Declined HD at admission, hence medically managed. This admission, he has received IV Ca chloride, albuterol neb, IV lasix 80 BID, sodium bicarb 100mEq and then continuous infusion, and lokelma 10g daily.     # HAGMA,  Resolved  - Appropriately compensated respiratory alkalosis.   - Resolved with sodium bicarb infusion, which was discontinued on 11/22.      # Sinus bradycardia ,  Resolved  - Likely secondary to hyperkalemia. Resolved with improvement in K levels.   # Type 2 diabetes mellitus with diabetic nephropathy  #  CKD stage 5  - SCr 8.07 this admission, similar to last known baseline SCr from 2021  - Renal biopsy in 08/2021 showed advanced diabetic glomerulosclerosis class IV  - originally declines RRT, has several family members that has been on, after his discussion with family he is now agreeable to RRT, interested in home therapy   # Hypertension, controlled   - Goal BP < 140/90  - lasix, hydralazine     # Anemia of CKD, sub-optimal goal  - Goal Hgb 10-11  - Ferritin 175  - Iron 25  - %sat 13  - start RANDY with iHD   - Pharm to dose IV iron replacement   # BMD-CKD   - Ca+: 7.1, 6.9  - Phos 3.9  -   - Vit D 19      PLAN:  Pt now agreeable to RRT  Previously discussed modality options and is interested in home therapy, will work on as outpt and will notify [facility] program for home eval   Will get Permcath placed and initiate hemodialysis x 3 days  Refer for AVF/PD cath as outpt   Start Vit D PO ergo  IV iron replacement  Avoid ARBs/ACEi for now until start hemodialysis  Renal diet  Avoid NSAIDs, Nephrotoxins  Will need outpt chair set up prior to discharge, prefers [facility] they think, case management consulted

## 2023-11-24 NOTE — PROGRESS NOTES
Matthew from Lab called with critical result of Calcium at 6.9. Critical lab result read back to Matthew.   Oliver Watson, Hospitalist notified of critical lab result: Ca @ 6.9 at 0306 by this RN.

## 2023-11-24 NOTE — CARE PLAN
The patient is Stable - Low risk of patient condition declining or worsening    Shift Goals  Clinical Goals: monitor vitals and trend labs  Patient Goals: rest  Family Goals: NA    Progress made toward(s) clinical / shift goals:    Problem: Knowledge Deficit:  Goal: Patient's knowledge of the prescribed therapeutic regimen will improve  Outcome: Progressing     Problem: Mobility  Goal: Patient's capacity to carry out activities will improve  Description: Target End Date:  Prior to discharge or change in level of care    1.  Assess for barriers to mobility/activity  2.  Implement activity per interdisciplinary team recommendations  3.  Target activity level identified and patient/family/caregiver aware of goal  4.  Provide assistive devices  5.  Instruct patient/caregiver on proper use of assistive/adaptive devices  6.  Schedule activities and rest periods to decrease effects of fatigue  7.  Encourage mobilization to extent of ability  8.  Maintain proper body alignment  9.  Provide adequate pain management to allow progressive mobilization  10. Implement pace maker precautions as needed  Outcome: Progressing       Patient is not progressing towards the following goals:

## 2023-11-24 NOTE — PROGRESS NOTES
[facility] Medicine Daily Progress Note    Date of Service  11/24/2023    Chief Complaint  Donte Horta is a 63 y.o. male admitted 11/20/2023 with dyspnea    Hospital Course  3 y.o. male who presented 11/20/2023 with history of hypertension cardiomyopathy EF 25-30% type 2 diabetes chronic kidney disease with prior biopsy revealing advanced diabetic glomerulosclerosis and history of noncompliance presented to the emergency department for evaluation of dyspnea and was noted to have worsening renal function and hyperkalemia K 8.0 .  He was bradycardic and received atropine and started on IV bicarb pushes and subsequent bicarb drip and Lokelma and diuretics and admitted to the intensive care unit.     The patient reports that he was getting low on his home medications and was stretching the doses so he does not run out.  He has not followed up with outpatient cardiology and nephrology due to transportation issues and concerns about cost.      Interval Problem Update  No acute events overnight.  Permacath ordered for hemodialysis, may not get it until Monday given holiday weekend.  Will need outpatient dialysis chair set up.  Monitor daily metabolic panel. K normal today.      I have discussed this patient's plan of care and discharge plan at IDT rounds today with Case Management, Nursing, Nursing leadership, and other members of the IDT team.    Consultants/Specialty  critical care and nephrology    Code Status  DNAR/DNI    Disposition  The patient is not medically cleared for discharge to home or a post-acute facility.  Anticipate discharge to: home with close outpatient follow-up    I have placed the appropriate orders for post-discharge needs.    Review of Systems  Review of Systems   Constitutional:  Positive for malaise/fatigue. Negative for chills and fever.   Eyes:  Negative for blurred vision and pain.   Respiratory:  Negative for cough and shortness of breath.    Cardiovascular:  Negative for chest pain,  palpitations and leg swelling.   Gastrointestinal:  Negative for abdominal pain, nausea and vomiting.   Genitourinary:  Negative for dysuria and urgency.   Musculoskeletal:  Negative for back pain.   Skin:  Negative for itching and rash.   Neurological:  Negative for dizziness, sensory change, focal weakness and headaches.   Psychiatric/Behavioral:  Negative for substance abuse. The patient does not have insomnia.    All other systems reviewed and are negative.       Physical Exam  Temp:  [36.7 °C (98 °F)-36.8 °C (98.3 °F)] 36.8 °C (98.3 °F)  Pulse:  [61-71] 71  Resp:  [18-20] 18  BP: (121-143)/(63-74) 138/74  SpO2:  [91 %-93 %] 91 %    Physical Exam  Vitals and nursing note reviewed.   Constitutional:       Appearance: He is well-developed. He is not diaphoretic.   HENT:      Head: Normocephalic and atraumatic.      Mouth/Throat:      Pharynx: No oropharyngeal exudate.   Eyes:      General: No scleral icterus.        Right eye: No discharge.         Left eye: No discharge.      Conjunctiva/sclera: Conjunctivae normal.      Pupils: Pupils are equal, round, and reactive to light.   Neck:      Vascular: No JVD.      Trachea: No tracheal deviation.   Cardiovascular:      Rate and Rhythm: Normal rate and regular rhythm.      Heart sounds: No murmur heard.     No friction rub. No gallop.   Pulmonary:      Effort: Pulmonary effort is normal. No respiratory distress.      Breath sounds: No stridor. Decreased breath sounds present. No wheezing.   Chest:      Chest wall: No tenderness.   Abdominal:      General: Bowel sounds are normal. There is no distension.      Palpations: Abdomen is soft.      Tenderness: There is no abdominal tenderness. There is no guarding or rebound.   Musculoskeletal:         General: No swelling or tenderness.      Cervical back: Neck supple.      Right lower leg: No edema.      Left lower leg: No edema.   Skin:     General: Skin is warm and dry.      Nails: There is no clubbing.   Neurological:       Mental Status: He is alert and oriented to person, place, and time.      Cranial Nerves: No cranial nerve deficit.      Sensory: No sensory deficit.      Motor: No weakness or abnormal muscle tone.   Psychiatric:         Behavior: Behavior normal.         Fluids    Intake/Output Summary (Last 24 hours) at 11/24/2023 1030  Last data filed at 11/24/2023 0800  Gross per 24 hour   Intake 240 ml   Output 650 ml   Net -410 ml         Laboratory  Recent Labs     11/22/23  0300 11/23/23  0230 11/23/23  1051   WBC 8.7 10.2 10.4   RBC 2.46* 2.65* 2.91*   HEMOGLOBIN 7.3* 7.8* 8.6*   HEMATOCRIT 21.1* 22.8* 25.3*   MCV 85.8 86.0 86.9   MCH 29.7 29.4 29.6   MCHC 34.6 34.2 34.0   RDW 45.9 45.5 46.1   PLATELETCT 223 257 281   MPV 10.7 11.5 10.4       Recent Labs     11/22/23  0820 11/23/23  0230 11/24/23  0218   SODIUM 134* 131* 131*   POTASSIUM 3.8 4.1 4.2   CHLORIDE 100 96 96   CO2 21 22 24   GLUCOSE 165* 121* 116*   BUN 67* 62* 60*   CREATININE 7.42* 7.35* 7.01*   CALCIUM 7.4* 7.1* 6.9*                     Imaging  DX-CHEST-PORTABLE (1 VIEW)   Final Result      1.  Marked enlargement of cardiac silhouette      2.  Bilateral atelectasis      3.  Probable small right pleural effusion      4.  Hyperinflation consistent with chronic obstructive pulmonary disease      IR-MOELLER,GROSHONG PLACEMENT < 5    (Results Pending)        Assessment/Plan  * Hyperkalemia- (present on admission)  Assessment & Plan  Secondary to progression of end-stage renal disease  Hyperkalemia resolved discussed with nephrology bicarb drip and Lokelma discontinued     discussed with the patient that he has progressed to ESRD and that he will likely need hemodialysis  Patient initially declining hemodialysis, he has changed his mind now and agrees to starting dialysis  No hospice at this time  Permacath ordered, will need outpatient dialysis chair set up as well prior to discharge    Advance care planning  Assessment & Plan  At this time patient is  interested in hospice referral which has been ordered    Noncompliance  Assessment & Plan  Patient has not followed up with cardiology or nephrology as outpatient and was stretching his outpatient medications  Counseled on compliance with medical therapy        Hyponatremia  Assessment & Plan  Sodium 134    Monitor electrolytes    Metabolic acidosis  Assessment & Plan  Secondary to renal failure      Anemia due to chronic kidney disease  Assessment & Plan  No clinical signs of bleeding  Overnight drop likely dilutional    Hypertension- (present on admission)  Assessment & Plan  Increase hydralazine and monitor blood pressure    Acute renal failure superimposed on chronic kidney disease (HCC)  Assessment & Plan  Likely progressed to end-stage renal disease    Chronic systolic congestive heart failure (HCC)- (present on admission)  Assessment & Plan  Given hyperkalemia losartan and spironolactone were discontinued  Given bradycardia carvedilol was discontinued we will consider resuming beta-blocker if heart rate remains stable  Transition to PO lasix  Resume home coreg    Type 2 diabetes mellitus (HCC)- (present on admission)  Assessment & Plan  Continue insulin sliding scale and monitor CBGs   hemoglobin A1c 5.8         VTE prophylaxis:    heparin ppx

## 2023-11-24 NOTE — PROGRESS NOTES
Assumed care of patient at bedside report from day RN. Updated on POC. Patient currently A & O x 4; on room air; up stand by assist with no complaints of acute pain. Assessment completed. Call light within reach. Whiteboard updated. Fall precautions in place. Bed locked and in lowest position. All questions answered. No other needs indicated at this time.

## 2023-11-24 NOTE — DISCHARGE PLANNING
Case Management Discharge Planning    Admission Date: 11/20/2023  GMLOS: 3.6  ALOS: 3    6-Clicks ADL Score:    6-Clicks Mobility Score:        Anticipated Discharge Dispo: Discharge Disposition: Discharged to home/self care (01), Outpatient Dialysis    DME Needed: No    Action(s) Taken: Updated Provider/Nurse on Discharge Plan and OTHER    Discussed discharge planning needs during rounds. Per MD, pt is not medically cleared, no need for hospice at this time because pt is now agreeing to do dialysis, pt needs permacath placed and will need outpatient dialysis set up.     FABIANA THOMAS called Peyton ([phone]), Dialysis Coordinator, but she is currently out of the office due to holiday. FABIANA THOMAS also called Flor ([phone]), Dialysis Coordinator covering on Monday. Left voicemail for call back for both contacts.    Escalations Completed: None    Medically Clear: No    Next Steps:   Follow up with treatment team for medical clearance and discharge planning needs.    Barriers to Discharge: Medical clearance and Dialysis    Is the patient up for discharge tomorrow: No

## 2023-11-24 NOTE — CARE PLAN
The patient is Stable - Low risk of patient condition declining or worsening    Shift Goals  Clinical Goals: start dialysis  Patient Goals: start dialysis  Family Goals: EFRAIN    Progress made toward(s) clinical / shift goals:    Axo4. Able to make needs known. Pt remained free from falls and injury throughout shift. Afebrile. Due medications given as ordered. Placed call light and personal belongings within reach.  Problem: Knowledge Deficit - Standard  Goal: Patient and family/care givers will demonstrate understanding of plan of care, disease process/condition, diagnostic tests and medications  Outcome: Progressing     Problem: Skin Integrity  Goal: Skin integrity is maintained or improved  Outcome: Progressing     Problem: Fall Risk  Goal: Patient will remain free from falls  Outcome: Progressing     Problem: Psychosocial Needs:  Goal: Ability to cope will improve  Outcome: Progressing     Problem: Urinary Elimination:  Goal: Ability to achieve and maintain adequate renal perfusion and functioning will improve  Outcome: Progressing  Goal: Ability to achieve a balanced intake and output will improve  Outcome: Progressing     Problem: Fluid Volume  Goal: Fluid volume balance will be maintained  Outcome: Progressing       Patient is not progressing towards the following goals:

## 2023-11-25 PROBLEM — F51.04 PSYCHOPHYSIOLOGIC INSOMNIA: Status: ACTIVE | Noted: 2023-11-25

## 2023-11-25 LAB
ANION GAP SERPL CALC-SCNC: 14 MMOL/L (ref 7–16)
BUN SERPL-MCNC: 61 MG/DL (ref 8–22)
CALCIUM SERPL-MCNC: 7.2 MG/DL (ref 8.5–10.5)
CHLORIDE SERPL-SCNC: 94 MMOL/L (ref 96–112)
CO2 SERPL-SCNC: 22 MMOL/L (ref 20–33)
CREAT SERPL-MCNC: 6.65 MG/DL (ref 0.5–1.4)
GFR SERPLBLD CREATININE-BSD FMLA CKD-EPI: 9 ML/MIN/1.73 M 2
GLUCOSE BLD STRIP.AUTO-MCNC: 120 MG/DL (ref 65–99)
GLUCOSE BLD STRIP.AUTO-MCNC: 133 MG/DL (ref 65–99)
GLUCOSE BLD STRIP.AUTO-MCNC: 170 MG/DL (ref 65–99)
GLUCOSE BLD STRIP.AUTO-MCNC: 230 MG/DL (ref 65–99)
GLUCOSE SERPL-MCNC: 117 MG/DL (ref 65–99)
POTASSIUM SERPL-SCNC: 4.1 MMOL/L (ref 3.6–5.5)
SODIUM SERPL-SCNC: 130 MMOL/L (ref 135–145)

## 2023-11-25 PROCEDURE — 700105 HCHG RX REV CODE 258: Performed by: NURSE PRACTITIONER

## 2023-11-25 PROCEDURE — 80048 BASIC METABOLIC PNL TOTAL CA: CPT

## 2023-11-25 PROCEDURE — A9270 NON-COVERED ITEM OR SERVICE: HCPCS | Performed by: STUDENT IN AN ORGANIZED HEALTH CARE EDUCATION/TRAINING PROGRAM

## 2023-11-25 PROCEDURE — 700111 HCHG RX REV CODE 636 W/ 250 OVERRIDE (IP): Mod: JZ | Performed by: NURSE PRACTITIONER

## 2023-11-25 PROCEDURE — 82962 GLUCOSE BLOOD TEST: CPT | Mod: 91

## 2023-11-25 PROCEDURE — 770006 HCHG ROOM/CARE - MED/SURG/GYN SEMI*

## 2023-11-25 PROCEDURE — 700102 HCHG RX REV CODE 250 W/ 637 OVERRIDE(OP): Performed by: STUDENT IN AN ORGANIZED HEALTH CARE EDUCATION/TRAINING PROGRAM

## 2023-11-25 PROCEDURE — 36415 COLL VENOUS BLD VENIPUNCTURE: CPT

## 2023-11-25 PROCEDURE — 90935 HEMODIALYSIS ONE EVALUATION: CPT

## 2023-11-25 PROCEDURE — 99232 SBSQ HOSP IP/OBS MODERATE 35: CPT | Performed by: STUDENT IN AN ORGANIZED HEALTH CARE EDUCATION/TRAINING PROGRAM

## 2023-11-25 PROCEDURE — 700111 HCHG RX REV CODE 636 W/ 250 OVERRIDE (IP)

## 2023-11-25 PROCEDURE — A9270 NON-COVERED ITEM OR SERVICE: HCPCS | Performed by: HOSPITALIST

## 2023-11-25 PROCEDURE — 700102 HCHG RX REV CODE 250 W/ 637 OVERRIDE(OP): Performed by: HOSPITALIST

## 2023-11-25 PROCEDURE — 5A1D70Z PERFORMANCE OF URINARY FILTRATION, INTERMITTENT, LESS THAN 6 HOURS PER DAY: ICD-10-PCS | Performed by: INTERNAL MEDICINE

## 2023-11-25 RX ORDER — HEPARIN SODIUM 1000 [USP'U]/ML
INJECTION, SOLUTION INTRAVENOUS; SUBCUTANEOUS
Status: COMPLETED
Start: 2023-11-25 | End: 2023-11-25

## 2023-11-25 RX ORDER — HEPARIN SODIUM 1000 [USP'U]/ML
1500 INJECTION, SOLUTION INTRAVENOUS; SUBCUTANEOUS
Status: DISCONTINUED | OUTPATIENT
Start: 2023-11-25 | End: 2023-11-27 | Stop reason: DRUGHIGH

## 2023-11-25 RX ORDER — HEPARIN SODIUM 1000 [USP'U]/ML
3700 INJECTION, SOLUTION INTRAVENOUS; SUBCUTANEOUS
Status: DISCONTINUED | OUTPATIENT
Start: 2023-11-25 | End: 2023-11-30 | Stop reason: HOSPADM

## 2023-11-25 RX ADMIN — HYDRALAZINE HYDROCHLORIDE 25 MG: 50 TABLET, FILM COATED ORAL at 05:11

## 2023-11-25 RX ADMIN — FUROSEMIDE 40 MG: 40 TABLET ORAL at 16:48

## 2023-11-25 RX ADMIN — HEPARIN SODIUM 5000 UNITS: 5000 INJECTION, SOLUTION INTRAVENOUS; SUBCUTANEOUS at 05:12

## 2023-11-25 RX ADMIN — HYDRALAZINE HYDROCHLORIDE 25 MG: 50 TABLET, FILM COATED ORAL at 13:39

## 2023-11-25 RX ADMIN — HYDRALAZINE HYDROCHLORIDE 25 MG: 50 TABLET, FILM COATED ORAL at 21:30

## 2023-11-25 RX ADMIN — FUROSEMIDE 40 MG: 40 TABLET ORAL at 05:12

## 2023-11-25 RX ADMIN — INSULIN LISPRO 2 UNITS: 100 INJECTION, SOLUTION INTRAVENOUS; SUBCUTANEOUS at 13:44

## 2023-11-25 RX ADMIN — SODIUM CHLORIDE 250 MG: 9 INJECTION, SOLUTION INTRAVENOUS at 18:12

## 2023-11-25 RX ADMIN — HEPARIN SODIUM 1500 UNITS: 1000 INJECTION, SOLUTION INTRAVENOUS; SUBCUTANEOUS at 07:53

## 2023-11-25 RX ADMIN — HEPARIN SODIUM 5000 UNITS: 5000 INJECTION, SOLUTION INTRAVENOUS; SUBCUTANEOUS at 18:11

## 2023-11-25 RX ADMIN — CARVEDILOL 25 MG: 25 TABLET, FILM COATED ORAL at 18:10

## 2023-11-25 RX ADMIN — SODIUM CHLORIDE 250 MG: 9 INJECTION, SOLUTION INTRAVENOUS at 05:45

## 2023-11-25 RX ADMIN — INSULIN LISPRO 1 UNITS: 100 INJECTION, SOLUTION INTRAVENOUS; SUBCUTANEOUS at 21:34

## 2023-11-25 RX ADMIN — HEPARIN SODIUM 3700 UNITS: 1000 INJECTION, SOLUTION INTRAVENOUS; SUBCUTANEOUS at 09:55

## 2023-11-25 ASSESSMENT — ENCOUNTER SYMPTOMS
PALPITATIONS: 0
SHORTNESS OF BREATH: 0
HEADACHES: 0
INSOMNIA: 1
DIZZINESS: 0
MYALGIAS: 0
FEVER: 0
NAUSEA: 0
ABDOMINAL PAIN: 0
CHILLS: 0
VOMITING: 0
COUGH: 0

## 2023-11-25 ASSESSMENT — PAIN DESCRIPTION - PAIN TYPE
TYPE: ACUTE PAIN
TYPE: ACUTE PAIN

## 2023-11-25 NOTE — CARE PLAN
The patient is Stable - Low risk of patient condition declining or worsening    Shift Goals  Clinical Goals: Monitor vitals and labs  Patient Goals: Comfort  Family Goals: EFRAIN    Progress made toward(s) clinical / shift goals:        Problem: Skin Integrity  Goal: Skin integrity is maintained or improved  Outcome: Progressing     Problem: Knowledge Deficit - Standard  Goal: Patient and family/care givers will demonstrate understanding of plan of care, disease process/condition, diagnostic tests and medications  Outcome: Progressing     Problem: Fall Risk  Goal: Patient will remain free from falls  Outcome: Progressing     Problem: Respiratory  Goal: Patient will achieve/maintain optimum respiratory ventilation and gas exchange  Outcome: Progressing       Patient is not progressing towards the following goals:

## 2023-11-25 NOTE — CARE PLAN
The patient is Stable - Low risk of patient condition declining or worsening    Shift Goals  Clinical Goals: Educate pt on dialysis  Patient Goals: Comfort  Family Goals: EFRAIN    Progress made toward(s) clinical / shift goals:    Problem: Fall Risk  Goal: Patient will remain free from falls  Outcome: Progressing     Problem: Psychosocial Needs:  Goal: Ability to cope will improve  Outcome: Progressing

## 2023-11-25 NOTE — PROGRESS NOTES
"[facility] -  PROGRESS NOTE               Author: Hang Maradiaga D.O. Date & Time: 11/25/2023  2:44 PM     HPI:  Mr. 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 He reports having \"cold like symptoms\" with productive cough and SOB that he describes as inability to take a full breath worse on exertion and lying down flat. He denies chest pain, palpitations, headaches, profuse sweating, fevers or chills, etc.      He reported taking half the dose of his home meds for a few weeks prior to current hospitalization in order to make them last till his doctor appt. He restarted taking full doses of his meds including aldactone, losartan, amlodipine, atorva, and coreg on Saturday or Sunday which is when his sxs of SOB also seem to have started.     He was found to have hyperkalemia with K of 8, Scr of 8 similar to prior known Scr in 2021. Labs also showed HAGMA, with bicarb of 7-9 at admission. He refuses HD due to personal preferences. Hence hyperkalemia was managed medically and nephrology was consulted for management of hyperkalemia and CKD 5 which is progressing over the years.     DAILY NEPHROLOGY SUMMARY:  11/21: Initial consult done. K and metabolic acidosis improved with meds, sxs of SOB also improved with tx. Seen by palliative care for transition to hospice at discharge.   11/22: No overnight events. Weaning off O2. Hyperkalemia and HAGMA have resolved on labs this morning. Discontinued Lokelma (K <4) and bicarb (CO2 21).   11/23: pt had declined RRT and was leaning towards hospice but his family talked him into trying RRT, we discussed different options, he does live in " [place] so is interested in home therapy, poor appetite, some nausea this am, + SOB with exertion, no edema   11/24: Feeling well without CP SOB or worsening edema.  Getting permcath today.  VSS.  Labs unchanged    REVIEW OF SYSTEMS:    10 point ROS reviewed and is as per HPI/daily summary or otherwise negative    PMH/PSH/SH/FH:   Reviewed and unchanged since admission note    CURRENT MEDICATIONS:   Reviewed from admission to present day    VS:  BP (!) 151/67   Pulse 67   Temp 36.7 °C (98.1 °F) (Temporal)   Resp 18   Ht 1.778 m (5' 10\")   Wt 104 kg (228 lb 2.8 oz)   SpO2 96%   BMI 32.74 kg/m²     Physical Exam  Vitals and nursing note reviewed.   Constitutional:       General: He is not in acute distress.     Appearance: Normal appearance. He is obese. He is not ill-appearing, toxic-appearing or diaphoretic.   HENT:      Head: Normocephalic and atraumatic.      Mouth/Throat:      Mouth: Mucous membranes are moist.      Pharynx: Oropharynx is clear.   Eyes:      General: No scleral icterus.        Right eye: No discharge.         Left eye: No discharge.      Extraocular Movements: Extraocular movements intact.      Conjunctiva/sclera: Conjunctivae normal.      Pupils: Pupils are equal, round, and reactive to light.   Cardiovascular:      Rate and Rhythm: Normal rate and regular rhythm.      Pulses: Normal pulses.      Heart sounds: Normal heart sounds. No murmur heard.     No friction rub. No gallop.      Comments: No JVD or hepatojugular reflex  Pulmonary:      Effort: Pulmonary effort is normal. No respiratory distress.      Breath sounds: Normal breath sounds. No wheezing, rhonchi or rales.   Chest:      Chest wall: No tenderness.   Abdominal:      General: Abdomen is flat. Bowel sounds are normal. There is no distension.      Palpations: Abdomen is soft. There is no mass.      Tenderness: There is no abdominal tenderness. There is no guarding or rebound.   Musculoskeletal:      Cervical back: Normal " range of motion and neck supple.      Right lower leg: Edema (1+ till the ankles) present.      Left lower leg: Edema (1+ till the ankles) present.      Comments: B/L 1+ pitting pedal edema noted till the ankles, symmetric bilaterally.    Skin:     General: Skin is warm.      Capillary Refill: Capillary refill takes less than 2 seconds.      Coloration: Skin is not jaundiced.   Neurological:      General: No focal deficit present.      Mental Status: He is alert and oriented to person, place, and time. Mental status is at baseline.      Cranial Nerves: No cranial nerve deficit.   Psychiatric:         Mood and Affect: Mood normal.         Behavior: Behavior normal.         Thought Content: Thought content normal.         Judgment: Judgment normal.         Fluids:  In: 150 [P.O.:150]  Out: 900     LABS:  Recent Labs     11/23/23  0230 11/24/23  0218 11/25/23  0241   SODIUM 131* 131* 130*   POTASSIUM 4.1 4.2 4.1   CHLORIDE 96 96 94*   CO2 22 24 22   GLUCOSE 121* 116* 117*   BUN 62* 60* 61*   CREATININE 7.35* 7.01* 6.65*   CALCIUM 7.1* 6.9* 7.2*         IMAGING:  Reviewed results of the CXR. On my personal review, the cardiac silhouette appears slightly enlarged with blunting of b/l costophrenic angles, worse on the left side.      ASSESSMENTS  # CKD stage 5 - New ESRD  SCr 8.07 this admission, similar to last known baseline SCr from 2021  Renal biopsy in 08/2021 showed advanced diabetic glomerulosclerosis class IV  Originally declines RRT, has several family members that has been on, after   his discussion with family he is now agreeable to RRT,   interested in home therapy   Now dialysis dependent  First HD today (SAT)  Will need several, daily, slow efficiency treatments   High risk for dysequilibrium syndrome = slow treatments  # Hyperkalemia, Resolved  Likely secondary to renal insufficiency and home meds.   K of 8 at admission, now down to 3.8-4.2 on most recent labs.  Declined HD at admission, hence medically  managed.   This admission, he has received IV Ca chloride, albuterol neb,   IV lasix 80 BID, sodium bicarb 100mEq and then continuous infusion,   and lokelma 10g daily.   # HAGMA,  Resolved  Appropriately compensated respiratory alkalosis.   Resolved with sodium bicarb infusion, which was discontinued on 11/22.   # Sinus bradycardia ,  Resolved  Likely secondary to hyperkalemia. Resolved with improvement in K levels.   # Type 2 diabetes mellitus with diabetic nephropathy  # Hypertension, controlled   Goal BP < 140/90  lasix, hydralazine  # Anemia of CKD, sub-optimal goal  Goal Hgb 10-11  Ferritin 175  Iron 25  %sat 13  start RANDY with iHD   Pharm to dose IV iron replacement   # BMD-CKD    Ca+: 7.1, 6.9  Phos 3.9    Vit D 19      SUGGESTIONS:  HD today (SAT)  Several, daily, low efficiency, HD treatments to limit dysequilibrium risk and clear uremia  Interested in home therapy  Will work on as outpt and will notify [facility] for home eval   Refer for AVF/PD cath as outpt   Start Vit D PO ergo  IV iron replacement  Avoid ARBs/ACEi for now until start hemodialysis  Renal diet  Avoid NSAIDs, Nephrotoxins  Will need outpt chair set up prior to discharge,   prefers [facility] they think, case management consulted     Thank you

## 2023-11-25 NOTE — PROGRESS NOTES
Pt presents to IR 2. Patient was consented by MD at bedside, confirmed by this RN and consent at bedside. Pt transferred to IR 2 table in Supine position. Patient underwent a Tunneled HD catheter placement by Dr. Ruiz. Procedure site was marked by MD and verified using imaging guidance. Pt placed on monitor, prepped and draped in a sterile fashion. Vitals were taken every 5 minutes and remained stable during procedure (see doc flow sheet for results). CO2 waveform capnography was monitored and remained WNL throughout procedure. Report called to Mignon JUNG. Pt transported by Hospital bed with RN to S631-2.       [facility] Hemo Split Long-term hemodialysis catheter 14.5F x 23cm    REF: [id number]  LOT: [id number]  EXP: 06-

## 2023-11-25 NOTE — OR SURGEON
Immediate Post- Operative Note    PostOp Diagnosis: RENAL FAILURE      Procedure(s): RIGHT INTERNAL JUGULAR 14.5 Scottish 23 CM HEMO-SPLIT TUNNELED HEMODIALYSIS CATHETER PLACEMENT WITH U/S AND FLUOROSCOPIC GUIDANCE      Estimated Blood Loss: <20CC (FLUSHES)      FINDINGS: CATHETER TIP AT RA IN GOOD POSITION        Complications: NONE          11/24/2023     6:13 PM     Norris Ruiz M.D.

## 2023-11-25 NOTE — PROGRESS NOTES
[facility] Progress Note    Hemodialysis treatment #1 ordered today per Dr. Maradiaga x 2 hours. Treatment initiated at 0753 and ended at 0953.    Pt A/Ox4, VSS, procedure explained, verbalized understanding, consent signed. Pt stable,  tolerated treatment; see e-flow sheets for details.    Net UF 1,000 mL    Post tx, CVC flushed with saline then locked with heparin 1000 units/mL per designated amount in each wing then clamped and capped. Aspirate heparin prior to next CVC use.    Report given to Primary RN.

## 2023-11-26 PROBLEM — N18.6 ESRD (END STAGE RENAL DISEASE) ON DIALYSIS (HCC): Status: ACTIVE | Noted: 2023-11-26

## 2023-11-26 PROBLEM — Z99.2 ESRD (END STAGE RENAL DISEASE) ON DIALYSIS (HCC): Status: ACTIVE | Noted: 2023-11-26

## 2023-11-26 LAB
ALBUMIN SERPL BCP-MCNC: 3.4 G/DL (ref 3.2–4.9)
BUN SERPL-MCNC: 39 MG/DL (ref 8–22)
CALCIUM ALBUM COR SERPL-MCNC: 7.7 MG/DL (ref 8.5–10.5)
CALCIUM SERPL-MCNC: 7.2 MG/DL (ref 8.5–10.5)
CHLORIDE SERPL-SCNC: 93 MMOL/L (ref 96–112)
CO2 SERPL-SCNC: 26 MMOL/L (ref 20–33)
CREAT SERPL-MCNC: 5.39 MG/DL (ref 0.5–1.4)
GFR SERPLBLD CREATININE-BSD FMLA CKD-EPI: 11 ML/MIN/1.73 M 2
GLUCOSE BLD STRIP.AUTO-MCNC: 114 MG/DL (ref 65–99)
GLUCOSE BLD STRIP.AUTO-MCNC: 133 MG/DL (ref 65–99)
GLUCOSE BLD STRIP.AUTO-MCNC: 143 MG/DL (ref 65–99)
GLUCOSE BLD STRIP.AUTO-MCNC: 200 MG/DL (ref 65–99)
GLUCOSE SERPL-MCNC: 118 MG/DL (ref 65–99)
PHOSPHATE SERPL-MCNC: 3.8 MG/DL (ref 2.5–4.5)
POTASSIUM SERPL-SCNC: 4 MMOL/L (ref 3.6–5.5)
SODIUM SERPL-SCNC: 129 MMOL/L (ref 135–145)

## 2023-11-26 PROCEDURE — 700111 HCHG RX REV CODE 636 W/ 250 OVERRIDE (IP): Performed by: STUDENT IN AN ORGANIZED HEALTH CARE EDUCATION/TRAINING PROGRAM

## 2023-11-26 PROCEDURE — 82962 GLUCOSE BLOOD TEST: CPT

## 2023-11-26 PROCEDURE — 770006 HCHG ROOM/CARE - MED/SURG/GYN SEMI*

## 2023-11-26 PROCEDURE — 700102 HCHG RX REV CODE 250 W/ 637 OVERRIDE(OP)

## 2023-11-26 PROCEDURE — A9270 NON-COVERED ITEM OR SERVICE: HCPCS | Performed by: STUDENT IN AN ORGANIZED HEALTH CARE EDUCATION/TRAINING PROGRAM

## 2023-11-26 PROCEDURE — A9270 NON-COVERED ITEM OR SERVICE: HCPCS

## 2023-11-26 PROCEDURE — 90935 HEMODIALYSIS ONE EVALUATION: CPT

## 2023-11-26 PROCEDURE — 99232 SBSQ HOSP IP/OBS MODERATE 35: CPT | Performed by: STUDENT IN AN ORGANIZED HEALTH CARE EDUCATION/TRAINING PROGRAM

## 2023-11-26 PROCEDURE — 700102 HCHG RX REV CODE 250 W/ 637 OVERRIDE(OP): Performed by: STUDENT IN AN ORGANIZED HEALTH CARE EDUCATION/TRAINING PROGRAM

## 2023-11-26 PROCEDURE — 36415 COLL VENOUS BLD VENIPUNCTURE: CPT

## 2023-11-26 PROCEDURE — A9270 NON-COVERED ITEM OR SERVICE: HCPCS | Performed by: HOSPITALIST

## 2023-11-26 PROCEDURE — 700111 HCHG RX REV CODE 636 W/ 250 OVERRIDE (IP)

## 2023-11-26 PROCEDURE — 80069 RENAL FUNCTION PANEL: CPT

## 2023-11-26 PROCEDURE — 700111 HCHG RX REV CODE 636 W/ 250 OVERRIDE (IP): Mod: JZ | Performed by: NURSE PRACTITIONER

## 2023-11-26 PROCEDURE — A9270 NON-COVERED ITEM OR SERVICE: HCPCS | Performed by: NURSE PRACTITIONER

## 2023-11-26 PROCEDURE — 700105 HCHG RX REV CODE 258: Performed by: NURSE PRACTITIONER

## 2023-11-26 PROCEDURE — 700102 HCHG RX REV CODE 250 W/ 637 OVERRIDE(OP): Performed by: NURSE PRACTITIONER

## 2023-11-26 PROCEDURE — 700102 HCHG RX REV CODE 250 W/ 637 OVERRIDE(OP): Performed by: HOSPITALIST

## 2023-11-26 RX ORDER — HEPARIN SODIUM 1000 [USP'U]/ML
INJECTION, SOLUTION INTRAVENOUS; SUBCUTANEOUS
Status: COMPLETED
Start: 2023-11-26 | End: 2023-11-26

## 2023-11-26 RX ORDER — CALCIUM CARBONATE 500 MG/1
500 TABLET, CHEWABLE ORAL 2 TIMES DAILY
Status: DISCONTINUED | OUTPATIENT
Start: 2023-11-26 | End: 2023-11-28

## 2023-11-26 RX ORDER — HEPARIN SODIUM 1000 [USP'U]/ML
1800 INJECTION, SOLUTION INTRAVENOUS; SUBCUTANEOUS
Status: COMPLETED | OUTPATIENT
Start: 2023-11-26 | End: 2023-11-26

## 2023-11-26 RX ORDER — CALCIUM GLUCONATE 20 MG/ML
2 INJECTION, SOLUTION INTRAVENOUS ONCE
Status: COMPLETED | OUTPATIENT
Start: 2023-11-26 | End: 2023-11-26

## 2023-11-26 RX ADMIN — HYDRALAZINE HYDROCHLORIDE 25 MG: 50 TABLET, FILM COATED ORAL at 22:34

## 2023-11-26 RX ADMIN — SENNOSIDES AND DOCUSATE SODIUM 2 TABLET: 50; 8.6 TABLET ORAL at 05:36

## 2023-11-26 RX ADMIN — ANTACID TABLETS 500 MG: 500 TABLET, CHEWABLE ORAL at 08:05

## 2023-11-26 RX ADMIN — INSULIN LISPRO 1 UNITS: 100 INJECTION, SOLUTION INTRAVENOUS; SUBCUTANEOUS at 18:16

## 2023-11-26 RX ADMIN — FUROSEMIDE 40 MG: 40 TABLET ORAL at 16:30

## 2023-11-26 RX ADMIN — ACETAMINOPHEN 650 MG: 325 TABLET, FILM COATED ORAL at 22:34

## 2023-11-26 RX ADMIN — CALCIUM GLUCONATE 2 G: 20 INJECTION, SOLUTION INTRAVENOUS at 09:13

## 2023-11-26 RX ADMIN — HEPARIN SODIUM 1800 UNITS: 1000 INJECTION, SOLUTION INTRAVENOUS; SUBCUTANEOUS at 10:26

## 2023-11-26 RX ADMIN — CARVEDILOL 25 MG: 25 TABLET, FILM COATED ORAL at 18:08

## 2023-11-26 RX ADMIN — HEPARIN SODIUM 5000 UNITS: 5000 INJECTION, SOLUTION INTRAVENOUS; SUBCUTANEOUS at 18:17

## 2023-11-26 RX ADMIN — SODIUM CHLORIDE 250 MG: 9 INJECTION, SOLUTION INTRAVENOUS at 05:38

## 2023-11-26 RX ADMIN — HYDRALAZINE HYDROCHLORIDE 25 MG: 50 TABLET, FILM COATED ORAL at 05:36

## 2023-11-26 RX ADMIN — ANTACID TABLETS 500 MG: 500 TABLET, CHEWABLE ORAL at 18:08

## 2023-11-26 RX ADMIN — TEMAZEPAM 7.5 MG: 7.5 CAPSULE ORAL at 22:34

## 2023-11-26 RX ADMIN — FUROSEMIDE 40 MG: 40 TABLET ORAL at 05:36

## 2023-11-26 RX ADMIN — HEPARIN SODIUM 3700 UNITS: 1000 INJECTION, SOLUTION INTRAVENOUS; SUBCUTANEOUS at 12:57

## 2023-11-26 RX ADMIN — HEPARIN SODIUM 5000 UNITS: 5000 INJECTION, SOLUTION INTRAVENOUS; SUBCUTANEOUS at 05:36

## 2023-11-26 RX ADMIN — ACETAMINOPHEN 650 MG: 325 TABLET, FILM COATED ORAL at 00:52

## 2023-11-26 RX ADMIN — CARVEDILOL 25 MG: 25 TABLET, FILM COATED ORAL at 08:05

## 2023-11-26 RX ADMIN — HYDRALAZINE HYDROCHLORIDE 25 MG: 50 TABLET, FILM COATED ORAL at 13:41

## 2023-11-26 ASSESSMENT — ENCOUNTER SYMPTOMS
DIZZINESS: 0
SHORTNESS OF BREATH: 0
ABDOMINAL PAIN: 0
FEVER: 0
COUGH: 0
INSOMNIA: 1
HEADACHES: 0
CHILLS: 0
MYALGIAS: 0
NAUSEA: 0
PALPITATIONS: 0
VOMITING: 0

## 2023-11-26 ASSESSMENT — PAIN DESCRIPTION - PAIN TYPE: TYPE: ACUTE PAIN

## 2023-11-26 NOTE — PROGRESS NOTES
"[facility] -  PROGRESS NOTE               Author: GAVIN Vines Date & Time: 11/26/2023  12:31 PM     HPI:  Mr. 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 He reports having \"cold like symptoms\" with productive cough and SOB that he describes as inability to take a full breath worse on exertion and lying down flat. He denies chest pain, palpitations, headaches, profuse sweating, fevers or chills, etc.      He reported taking half the dose of his home meds for a few weeks prior to current hospitalization in order to make them last till his doctor appt. He restarted taking full doses of his meds including aldactone, losartan, amlodipine, atorva, and coreg on Saturday or Sunday which is when his sxs of SOB also seem to have started.     He was found to have hyperkalemia with K of 8, Scr of 8 similar to prior known Scr in 2021. Labs also showed HAGMA, with bicarb of 7-9 at admission. He refuses HD due to personal preferences. Hence hyperkalemia was managed medically and nephrology was consulted for management of hyperkalemia and CKD 5 which is progressing over the years.     DAILY NEPHROLOGY SUMMARY:  11/21: Initial consult done. K and metabolic acidosis improved with meds, sxs of SOB also improved with tx. Seen by palliative care for transition to hospice at discharge.   11/22: No overnight events. Weaning off O2. Hyperkalemia and HAGMA have resolved on labs this morning. Discontinued Lokelma (K <4) and bicarb (CO2 21).   11/23: pt had declined RRT and was leaning towards hospice but his family talked him into trying RRT, we discussed different options, " "he does live in [place] so is interested in home therapy, poor appetite, some nausea this am, + SOB with exertion, no edema   11/24: Feeling well without CP SOB or worsening edema.  Getting permcath today.  VSS.  Labs unchanged  11/25: Seen   11/26: HD yesterday net UF 1L.   VSS 2L NC.  No CP SOB edema.  Seen on HD feeling well, feels \"bloated\" today.     REVIEW OF SYSTEMS:    10 point ROS reviewed and is as per HPI/daily summary or otherwise negative    PMH/PSH/SH/FH:   Reviewed and unchanged since admission note    CURRENT MEDICATIONS:   Reviewed from admission to present day    VS:  /60   Pulse 63   Temp 37 °C (98.6 °F) (Temporal)   Resp 18   Ht 1.778 m (5' 10\")   Wt 104 kg (228 lb 2.8 oz)   SpO2 97%   BMI 32.74 kg/m²     Physical Exam  Vitals and nursing note reviewed.   Constitutional:       General: He is not in acute distress.     Appearance: Normal appearance. He is obese. He is not ill-appearing, toxic-appearing or diaphoretic.   HENT:      Head: Normocephalic and atraumatic.      Mouth/Throat:      Mouth: Mucous membranes are moist.      Pharynx: Oropharynx is clear.   Eyes:      General: No scleral icterus.        Right eye: No discharge.         Left eye: No discharge.      Extraocular Movements: Extraocular movements intact.      Conjunctiva/sclera: Conjunctivae normal.      Pupils: Pupils are equal, round, and reactive to light.   Cardiovascular:      Rate and Rhythm: Normal rate and regular rhythm.      Pulses: Normal pulses.      Heart sounds: Normal heart sounds. No murmur heard.     No friction rub. No gallop.      Comments: No JVD or hepatojugular reflex  Pulmonary:      Effort: Pulmonary effort is normal. No respiratory distress.      Breath sounds: Normal breath sounds. No wheezing, rhonchi or rales.   Chest:      Chest wall: No tenderness.   Abdominal:      General: Abdomen is flat. Bowel sounds are normal. There is no distension.      Palpations: Abdomen is soft. There is no " mass.      Tenderness: There is no abdominal tenderness. There is no guarding or rebound.   Musculoskeletal:      Cervical back: Normal range of motion and neck supple.      Right lower leg: Edema (1+ till the ankles) present.      Left lower leg: Edema (1+ till the ankles) present.      Comments: B/L trace+ pitting pedal edema noted till the ankles, symmetric bilaterally.    Skin:     General: Skin is warm.      Capillary Refill: Capillary refill takes less than 2 seconds.      Coloration: Skin is not jaundiced.   Neurological:      General: No focal deficit present.      Mental Status: He is alert and oriented to person, place, and time. Mental status is at baseline.      Cranial Nerves: No cranial nerve deficit.   Psychiatric:         Mood and Affect: Mood normal.         Behavior: Behavior normal.         Thought Content: Thought content normal.         Judgment: Judgment normal.         Fluids:  In: 960 [P.O.:460; Dialysis:500]  Out: 1500     LABS:  Recent Labs     11/24/23  0218 11/25/23  0241 11/26/23  0128   SODIUM 131* 130* 129*   POTASSIUM 4.2 4.1 4.0   CHLORIDE 96 94* 93*   CO2 24 22 26   GLUCOSE 116* 117* 118*   BUN 60* 61* 39*   CREATININE 7.01* 6.65* 5.39*   CALCIUM 6.9* 7.2* 7.2*         IMAGING:  Reviewed results of the CXR. On my personal review, the cardiac silhouette appears slightly enlarged with blunting of b/l costophrenic angles, worse on the left side.      ASSESSMENTS  # CKD stage 5 - New ESRD  SCr 8.07 this admission, similar to last known baseline SCr from 2021  Renal biopsy in 08/2021 showed advanced diabetic glomerulosclerosis class IV  Originally declines RRT, has several family members that has been on, after   his discussion with family he is now agreeable to RRT,   interested in home therapy   Now dialysis dependent  First HD today ([facility]AT)  Will need several, daily, slow efficiency treatments   High risk for dysequilibrium syndrome = slow treatments  # Hyperkalemia, Resolved  Likely  secondary to renal insufficiency and home meds.   K of 8 at admission, now down to 3.8-4.2 on most recent labs.  Declined HD at admission, hence medically managed.   This admission, he has received IV Ca chloride, albuterol neb,   IV lasix 80 BID, sodium bicarb 100mEq and then continuous infusion,   and lokelma 10g daily.   # HAGMA,  Resolved  Appropriately compensated respiratory alkalosis.   Resolved with sodium bicarb infusion, which was discontinued on 11/22.   # Sinus bradycardia ,  Resolved  Likely secondary to hyperkalemia. Resolved with improvement in K levels.   # Type 2 diabetes mellitus with diabetic nephropathy  # Hypertension, controlled   Goal BP < 140/90  lasix, hydralazine  # Anemia of CKD, sub-optimal goal  Goal Hgb 10-11  Ferritin 175  Iron 25  %sat 13  start RANDY with iHD   Pharm to dose IV iron replacement   # BMD-CKD    Ca+: 7.1, 6.9  Phos 3.9-> 3.8    Vit D 19      SUGGESTIONS:  HD today (SUN)  2.5 hours in progress  Several, daily, low efficiency, HD treatments to limit dysequilibrium risk and clear uremia, 3 hour tx MON  Interested in home therapy  Will work on as outpt and will notify [facility] for home eval   Refer for AVF/PD cath as outpt   On PO ergo  IV iron replacement  Avoid ARBs/ACEi for now until start hemodialysis  Renal diet  Avoid NSAIDs, Nephrotoxins  Will need outpt chair set up prior to discharge,   prefers [facility] they think, case management consulted     Thank you

## 2023-11-26 NOTE — PROGRESS NOTES
[facility] Progress Note         HD #2 today x 2.5 hours per Dr. Maradiaga.  Tx initiated at 1026 and ended at 1257    NET UF: 1000 ml    Tx tolerated. No HD complications encountered. Blood returned, ports flushed with NS. Heparin 1000 units/ml used to lock catheter per designated amount. CVC ports clamped and capped. Aspirate heparin prior to next CVC use. See eflow sheets for further details.    Report given to primary RN

## 2023-11-26 NOTE — CARE PLAN
The patient is Stable - Low risk of patient condition declining or worsening    Shift Goals  Clinical Goals: Increase ambulation  Patient Goals: Comfort  Family Goals: EFRAIN    Progress made toward(s) clinical / shift goals:    Problem: Knowledge Deficit - Standard  Goal: Patient and family/care givers will demonstrate understanding of plan of care, disease process/condition, diagnostic tests and medications  Outcome: Progressing     Problem: Skin Integrity  Goal: Skin integrity is maintained or improved  Outcome: Progressing

## 2023-11-26 NOTE — CARE PLAN
The patient is Stable - Low risk of patient condition declining or worsening    Shift Goals  Clinical Goals: Monitor and mantain oxygen level above 90% throughout this shift  Patient Goals: Comfort  Family Goals: EFRAIN    Patient maintained above 90% oxygen throughout this shift.    Progress made toward(s) clinical / shift goals:      Problem: Respiratory  Goal: Patient will achieve/maintain optimum respiratory ventilation and gas exchange  Outcome: Progressing     Problem: Knowledge Deficit:  Goal: Patient's knowledge of the prescribed therapeutic regimen will improve  Outcome: Progressing       Patient is not progressing towards the following goals:

## 2023-11-26 NOTE — PROGRESS NOTES
Hospital Medicine Daily Progress Note    Date of Service  11/25/2023    Chief Complaint  Donte Horta is a 63 y.o. male admitted 11/20/2023 with dyspnea    Hospital Course  3 y.o. male who presented 11/20/2023 with history of hypertension cardiomyopathy EF 25-30% type 2 diabetes chronic kidney disease with prior biopsy revealing advanced diabetic glomerulosclerosis and history of noncompliance presented to the emergency department for evaluation of dyspnea and was noted to have worsening renal function and hyperkalemia K 8.0 .  He was bradycardic and received atropine and started on IV bicarb pushes and subsequent bicarb drip and Lokelma and diuretics and admitted to the intensive care unit.     The patient reports that he was getting low on his home medications and was stretching the doses so he does not run out.  He has not followed up with outpatient cardiology and nephrology due to transportation issues and concerns about cost.      Interval Problem Update  No acute events overnight.  Permacath ordered for hemodialysis, may not get it until Monday given holiday weekend.  Will need outpatient dialysis chair set up.  Monitor daily metabolic panel. K normal today.    Above per previous hospitalist.    11/25/23  Complaining of insomnia.  Declining medications for sleep.  Permacath placed yesterday.  Undergoing hemodialysis today, net UF of 1 L.  Patient has no new complaints is feeling well.  Awaiting dialysis chair.  Discussed with case management.  Blood pressures improving after dialysis.  Potassium and stable at 4.1.      I have discussed this patient's plan of care and discharge plan at IDT rounds today with Case Management, Nursing, Nursing leadership, and other members of the IDT team.    Consultants/Specialty  critical care and nephrology    Code Status  DNAR/DNI    Disposition  The patient is not medically cleared for discharge to home or a post-acute facility.  Anticipate discharge to: home with  close outpatient follow-up    I have placed the appropriate orders for post-discharge needs.    Review of Systems  Review of Systems   Constitutional:  Negative for chills and fever.   Respiratory:  Negative for cough and shortness of breath.    Cardiovascular:  Negative for chest pain and palpitations.   Gastrointestinal:  Negative for abdominal pain, nausea and vomiting.   Genitourinary:  Negative for dysuria and hematuria.   Musculoskeletal:  Negative for joint pain and myalgias.   Neurological:  Negative for dizziness and headaches.   Psychiatric/Behavioral:  The patient has insomnia.         Physical Exam  Temp:  [36.4 °C (97.5 °F)-37.1 °C (98.7 °F)] 37.1 °C (98.7 °F)  Pulse:  [63-76] 67  Resp:  [18-30] 18  BP: (108-166)/(67-90) 108/76  SpO2:  [89 %-98 %] 93 %    Physical Exam  Vitals and nursing note reviewed. Exam conducted with a chaperone present.   Constitutional:       General: He is not in acute distress.     Appearance: Normal appearance. He is not ill-appearing.   HENT:      Head: Normocephalic and atraumatic.      Mouth/Throat:      Mouth: Mucous membranes are moist.      Pharynx: Oropharynx is clear. No oropharyngeal exudate.   Eyes:      General: No scleral icterus.        Right eye: No discharge.         Left eye: No discharge.      Conjunctiva/sclera: Conjunctivae normal.   Cardiovascular:      Rate and Rhythm: Normal rate and regular rhythm.      Pulses: Normal pulses.      Heart sounds: Normal heart sounds. No murmur heard.     Comments: Right subclavian dialysis catheter  Pulmonary:      Effort: Pulmonary effort is normal. No respiratory distress.      Breath sounds: Decreased breath sounds present.   Abdominal:      General: Abdomen is flat. Bowel sounds are normal. There is no distension.      Palpations: Abdomen is soft.   Musculoskeletal:         General: No swelling.      Cervical back: Neck supple. No tenderness.      Right lower leg: No edema.      Left lower leg: No edema.   Skin:      General: Skin is warm and dry.      Coloration: Skin is not pale.   Neurological:      Mental Status: He is alert and oriented to person, place, and time. Mental status is at baseline.      Motor: No weakness.   Psychiatric:         Thought Content: Thought content normal.         Judgment: Judgment normal.         Fluids    Intake/Output Summary (Last 24 hours) at 11/25/2023 1733  Last data filed at 11/25/2023 1037  Gross per 24 hour   Intake 890 ml   Output 1750 ml   Net -860 ml       Laboratory  Recent Labs     11/23/23  0230 11/23/23  1051   WBC 10.2 10.4   RBC 2.65* 2.91*   HEMOGLOBIN 7.8* 8.6*   HEMATOCRIT 22.8* 25.3*   MCV 86.0 86.9   MCH 29.4 29.6   MCHC 34.2 34.0   RDW 45.5 46.1   PLATELETCT 257 281   MPV 11.5 10.4     Recent Labs     11/23/23  0230 11/24/23  0218 11/25/23  0241   SODIUM 131* 131* 130*   POTASSIUM 4.1 4.2 4.1   CHLORIDE 96 96 94*   CO2 22 24 22   GLUCOSE 121* 116* 117*   BUN 62* 60* 61*   CREATININE 7.35* 7.01* 6.65*   CALCIUM 7.1* 6.9* 7.2*                   Imaging  IR-MOELLER,GROSHONG PLACEMENT < 5   Final Result      1. ULTRASOUND AND FLUOROSCOPIC GUIDED PLACEMENT OF A Right INTERNAL JUGULAR 14.5 [place] HemoSplit TUNNELED DIALYSIS CATHETER.      2. THE HEMODIALYSIS CATHETER MAY BE USED IMMEDIATELY AS CLINICALLY INDICATED. FLUSHES PER PROTOCOL.         DX-CHEST-PORTABLE (1 VIEW)   Final Result      1.  Marked enlargement of cardiac silhouette      2.  Bilateral atelectasis      3.  Probable small right pleural effusion      4.  Hyperinflation consistent with chronic obstructive pulmonary disease           Assessment/Plan  * Hyperkalemia- (present on admission)  Assessment & Plan  Secondary to progression of end-stage renal disease  Hyperkalemia resolved discussed with nephrology bicarb drip and Lokelma discontinued     discussed with the patient that he has progressed to ESRD and that he will likely need hemodialysis  Patient initially declining hemodialysis, he has changed his mind now  and agrees to starting dialysis  No hospice at this time  Permacath ordered, will need outpatient dialysis chair set up as well prior to discharge    Psychophysiologic insomnia- (present on admission)  Assessment & Plan  11/25/23  Declined medications.    Advance care planning  Assessment & Plan  At this time patient is interested in hospice referral which has been ordered    Noncompliance  Assessment & Plan  Patient has not followed up with cardiology or nephrology as outpatient and was stretching his outpatient medications  Counseled on compliance with medical therapy        Hyponatremia  Assessment & Plan  Sodium 134    Monitor electrolytes    Metabolic acidosis  Assessment & Plan  Secondary to renal failure      Anemia due to chronic kidney disease  Assessment & Plan  No clinical signs of bleeding  Overnight drop likely dilutional    Hypertension- (present on admission)  Assessment & Plan  Increase hydralazine and monitor blood pressure    Acute renal failure superimposed on chronic kidney disease (HCC)  Assessment & Plan  Likely progressed to end-stage renal disease    Chronic systolic congestive heart failure (HCC)- (present on admission)  Assessment & Plan  Given hyperkalemia losartan and spironolactone were discontinued  Given bradycardia carvedilol was discontinued we will consider resuming beta-blocker if heart rate remains stable  Transition to PO lasix  Resume home coreg    Type 2 diabetes mellitus (HCC)- (present on admission)  Assessment & Plan  Continue insulin sliding scale and monitor CBGs   hemoglobin A1c 5.8         VTE prophylaxis:   SCDs/TEDs   heparin ppx

## 2023-11-27 ENCOUNTER — PATIENT OUTREACH (OUTPATIENT)
Dept: SCHEDULING | Facility: IMAGING CENTER | Age: 63
End: 2023-11-27
Payer: MEDICAID

## 2023-11-27 LAB
ALBUMIN SERPL BCP-MCNC: 3.1 G/DL (ref 3.2–4.9)
ALBUMIN/GLOB SERPL: 1.1 G/DL
ALP SERPL-CCNC: 81 U/L (ref 30–99)
ALT SERPL-CCNC: 18 U/L (ref 2–50)
ANION GAP SERPL CALC-SCNC: 10 MMOL/L (ref 7–16)
AST SERPL-CCNC: 16 U/L (ref 12–45)
BILIRUB SERPL-MCNC: 0.4 MG/DL (ref 0.1–1.5)
BUN SERPL-MCNC: 24 MG/DL (ref 8–22)
CALCIUM ALBUM COR SERPL-MCNC: 8.5 MG/DL (ref 8.5–10.5)
CALCIUM SERPL-MCNC: 7.8 MG/DL (ref 8.5–10.5)
CHLORIDE SERPL-SCNC: 96 MMOL/L (ref 96–112)
CO2 SERPL-SCNC: 26 MMOL/L (ref 20–33)
CREAT SERPL-MCNC: 4.29 MG/DL (ref 0.5–1.4)
GFR SERPLBLD CREATININE-BSD FMLA CKD-EPI: 15 ML/MIN/1.73 M 2
GLOBULIN SER CALC-MCNC: 2.8 G/DL (ref 1.9–3.5)
GLUCOSE BLD STRIP.AUTO-MCNC: 112 MG/DL (ref 65–99)
GLUCOSE BLD STRIP.AUTO-MCNC: 140 MG/DL (ref 65–99)
GLUCOSE BLD STRIP.AUTO-MCNC: 205 MG/DL (ref 65–99)
GLUCOSE SERPL-MCNC: 119 MG/DL (ref 65–99)
POTASSIUM SERPL-SCNC: 4 MMOL/L (ref 3.6–5.5)
PROT SERPL-MCNC: 5.9 G/DL (ref 6–8.2)
SODIUM SERPL-SCNC: 132 MMOL/L (ref 135–145)

## 2023-11-27 PROCEDURE — A9270 NON-COVERED ITEM OR SERVICE: HCPCS | Performed by: STUDENT IN AN ORGANIZED HEALTH CARE EDUCATION/TRAINING PROGRAM

## 2023-11-27 PROCEDURE — 80053 COMPREHEN METABOLIC PANEL: CPT

## 2023-11-27 PROCEDURE — A9270 NON-COVERED ITEM OR SERVICE: HCPCS | Performed by: HOSPITALIST

## 2023-11-27 PROCEDURE — 700102 HCHG RX REV CODE 250 W/ 637 OVERRIDE(OP): Performed by: STUDENT IN AN ORGANIZED HEALTH CARE EDUCATION/TRAINING PROGRAM

## 2023-11-27 PROCEDURE — A9270 NON-COVERED ITEM OR SERVICE: HCPCS | Performed by: NURSE PRACTITIONER

## 2023-11-27 PROCEDURE — 99232 SBSQ HOSP IP/OBS MODERATE 35: CPT | Performed by: STUDENT IN AN ORGANIZED HEALTH CARE EDUCATION/TRAINING PROGRAM

## 2023-11-27 PROCEDURE — A9270 NON-COVERED ITEM OR SERVICE: HCPCS

## 2023-11-27 PROCEDURE — 36415 COLL VENOUS BLD VENIPUNCTURE: CPT

## 2023-11-27 PROCEDURE — 700102 HCHG RX REV CODE 250 W/ 637 OVERRIDE(OP): Performed by: HOSPITALIST

## 2023-11-27 PROCEDURE — 700111 HCHG RX REV CODE 636 W/ 250 OVERRIDE (IP)

## 2023-11-27 PROCEDURE — 770006 HCHG ROOM/CARE - MED/SURG/GYN SEMI*

## 2023-11-27 PROCEDURE — 700102 HCHG RX REV CODE 250 W/ 637 OVERRIDE(OP)

## 2023-11-27 PROCEDURE — 82962 GLUCOSE BLOOD TEST: CPT | Mod: 91

## 2023-11-27 PROCEDURE — 90935 HEMODIALYSIS ONE EVALUATION: CPT

## 2023-11-27 PROCEDURE — 700102 HCHG RX REV CODE 250 W/ 637 OVERRIDE(OP): Performed by: NURSE PRACTITIONER

## 2023-11-27 RX ORDER — HEPARIN SODIUM 1000 [USP'U]/ML
2000 INJECTION, SOLUTION INTRAVENOUS; SUBCUTANEOUS
Status: DISCONTINUED | OUTPATIENT
Start: 2023-11-27 | End: 2023-11-30 | Stop reason: HOSPADM

## 2023-11-27 RX ORDER — HEPARIN SODIUM 1000 [USP'U]/ML
INJECTION, SOLUTION INTRAVENOUS; SUBCUTANEOUS
Status: COMPLETED
Start: 2023-11-27 | End: 2023-11-27

## 2023-11-27 RX ADMIN — CARVEDILOL 25 MG: 25 TABLET, FILM COATED ORAL at 18:27

## 2023-11-27 RX ADMIN — INSULIN LISPRO 2 UNITS: 100 INJECTION, SOLUTION INTRAVENOUS; SUBCUTANEOUS at 20:19

## 2023-11-27 RX ADMIN — ANTACID TABLETS 500 MG: 500 TABLET, CHEWABLE ORAL at 18:27

## 2023-11-27 RX ADMIN — FUROSEMIDE 40 MG: 40 TABLET ORAL at 05:51

## 2023-11-27 RX ADMIN — HEPARIN SODIUM 5000 UNITS: 5000 INJECTION, SOLUTION INTRAVENOUS; SUBCUTANEOUS at 05:51

## 2023-11-27 RX ADMIN — HEPARIN SODIUM 2000 UNITS: 1000 INJECTION, SOLUTION INTRAVENOUS; SUBCUTANEOUS at 12:20

## 2023-11-27 RX ADMIN — HYDRALAZINE HYDROCHLORIDE 25 MG: 50 TABLET, FILM COATED ORAL at 05:50

## 2023-11-27 RX ADMIN — ANTACID TABLETS 500 MG: 500 TABLET, CHEWABLE ORAL at 05:52

## 2023-11-27 RX ADMIN — FUROSEMIDE 40 MG: 40 TABLET ORAL at 18:27

## 2023-11-27 RX ADMIN — SENNOSIDES AND DOCUSATE SODIUM 2 TABLET: 50; 8.6 TABLET ORAL at 18:27

## 2023-11-27 RX ADMIN — HEPARIN SODIUM 3700 UNITS: 1000 INJECTION, SOLUTION INTRAVENOUS; SUBCUTANEOUS at 15:26

## 2023-11-27 RX ADMIN — HEPARIN SODIUM 5000 UNITS: 5000 INJECTION, SOLUTION INTRAVENOUS; SUBCUTANEOUS at 18:00

## 2023-11-27 RX ADMIN — HYDRALAZINE HYDROCHLORIDE 25 MG: 50 TABLET, FILM COATED ORAL at 20:17

## 2023-11-27 RX ADMIN — ACETAMINOPHEN 650 MG: 325 TABLET, FILM COATED ORAL at 05:51

## 2023-11-27 ASSESSMENT — ENCOUNTER SYMPTOMS
FEVER: 0
MYALGIAS: 0
NAUSEA: 0
ABDOMINAL PAIN: 0
PALPITATIONS: 0
HEADACHES: 0
VOMITING: 0
CHILLS: 0
SHORTNESS OF BREATH: 0
COUGH: 0
DIZZINESS: 0
INSOMNIA: 1

## 2023-11-27 NOTE — PROGRESS NOTES
[facility] Medicine Daily Progress Note    Date of Service  11/26/2023    Chief Complaint  Donte Horta is a 63 y.o. male admitted 11/20/2023 with dyspnea    Hospital Course  3 y.o. male who presented 11/20/2023 with history of hypertension cardiomyopathy EF 25-30% type 2 diabetes chronic kidney disease with prior biopsy revealing advanced diabetic glomerulosclerosis and history of noncompliance presented to the emergency department for evaluation of dyspnea and was noted to have worsening renal function and hyperkalemia K 8.0 .  He was bradycardic and received atropine and started on IV bicarb pushes and subsequent bicarb drip and Lokelma and diuretics and admitted to the intensive care unit.     The patient reports that he was getting low on his home medications and was stretching the doses so he does not run out.  He has not followed up with outpatient cardiology and nephrology due to transportation issues and concerns about cost.      Interval Problem Update  No acute events overnight.  Permacath ordered for hemodialysis, may not get it until Monday given holiday weekend.  Will need outpatient dialysis chair set up.  Monitor daily metabolic panel. K normal today.    Above per previous hospitalist.    11/25/23  Complaining of insomnia.  Declining medications for sleep.  Permacath placed yesterday.  Undergoing hemodialysis today, net UF of 1 L.  Patient has no new complaints is feeling well.  Awaiting dialysis chair.  Discussed with case management.  Blood pressures improving after dialysis.  Potassium and stable at 4.1.    11/26/23  No new complaints.  Continues on 2 LPM oxygen via nasal cannula.  Sodium of 129.  No new weakness.  Potassium of 4.0.  Calcium corrected low at 7.7.  I have ordered calcium gluconate 2 g IV and started Tums x 2 times a day.  Patient is awaiting hemodialysis chair in the community.  He is medically clear for discharge.      I have discussed this patient's plan of care and  discharge plan at IDT rounds today with Case Management, Nursing, Nursing leadership, and other members of the IDT team.    Consultants/Specialty  critical care and nephrology    Code Status  DNAR/DNI    Disposition  The patient is medically cleared for discharge to home or a post-acute facility.  Anticipate discharge to: home with close outpatient follow-up    I have placed the appropriate orders for post-discharge needs.    Review of Systems  Review of Systems   Constitutional:  Negative for chills and fever.   Respiratory:  Negative for cough and shortness of breath.    Cardiovascular:  Negative for chest pain and palpitations.   Gastrointestinal:  Negative for abdominal pain, nausea and vomiting.   Genitourinary:  Negative for dysuria and hematuria.   Musculoskeletal:  Negative for joint pain and myalgias.   Neurological:  Negative for dizziness and headaches.   Psychiatric/Behavioral:  The patient has insomnia.         Physical Exam  Temp:  [36.8 °C (98.2 °F)-37.1 °C (98.7 °F)] 36.8 °C (98.3 °F)  Pulse:  [63-74] 72  Resp:  [16-18] 16  BP: (108-149)/(53-76) 140/53  SpO2:  [93 %-97 %] 93 %    Physical Exam  Vitals and nursing note reviewed.   Constitutional:       General: He is not in acute distress.     Appearance: Normal appearance. He is not ill-appearing.   HENT:      Head: Normocephalic and atraumatic.      Mouth/Throat:      Mouth: Mucous membranes are moist.      Pharynx: Oropharynx is clear. No oropharyngeal exudate.   Eyes:      General: No scleral icterus.        Right eye: No discharge.         Left eye: No discharge.      Conjunctiva/sclera: Conjunctivae normal.   Cardiovascular:      Rate and Rhythm: Normal rate and regular rhythm.      Pulses: Normal pulses.      Heart sounds: Normal heart sounds. No murmur heard.     Comments: Right subclavian dialysis catheter  Pulmonary:      Effort: Pulmonary effort is normal. No respiratory distress.      Breath sounds: Decreased breath sounds present.    Abdominal:      General: Abdomen is flat. Bowel sounds are normal. There is no distension.      Palpations: Abdomen is soft.   Musculoskeletal:         General: No swelling.      Cervical back: Neck supple. No tenderness.      Right lower leg: No edema.      Left lower leg: No edema.   Skin:     General: Skin is warm and dry.      Coloration: Skin is not pale.   Neurological:      Mental Status: He is alert and oriented to person, place, and time. Mental status is at baseline.      Motor: No weakness.   Psychiatric:         Thought Content: Thought content normal.         Judgment: Judgment normal.         Fluids    Intake/Output Summary (Last 24 hours) at 11/26/2023 1611  Last data filed at 11/26/2023 1400  Gross per 24 hour   Intake 720 ml   Output 1500 ml   Net -780 ml         Laboratory        Recent Labs     11/24/23  0218 11/25/23  0241 11/26/23  0128   SODIUM 131* 130* 129*   POTASSIUM 4.2 4.1 4.0   CHLORIDE 96 94* 93*   CO2 24 22 26   GLUCOSE 116* 117* 118*   BUN 60* 61* 39*   CREATININE 7.01* 6.65* 5.39*   CALCIUM 6.9* 7.2* 7.2*                     Imaging  IR-MOELLER,GROSHONG PLACEMENT < 5   Final Result      1. ULTRASOUND AND FLUOROSCOPIC GUIDED PLACEMENT OF A Right INTERNAL JUGULAR 14.5 [place] HemoSplit TUNNELED DIALYSIS CATHETER.      2. THE HEMODIALYSIS CATHETER MAY BE USED IMMEDIATELY AS CLINICALLY INDICATED. FLUSHES PER PROTOCOL.         DX-CHEST-PORTABLE (1 VIEW)   Final Result      1.  Marked enlargement of cardiac silhouette      2.  Bilateral atelectasis      3.  Probable small right pleural effusion      4.  Hyperinflation consistent with chronic obstructive pulmonary disease           Assessment/Plan  * Hyperkalemia- (present on admission)  Assessment & Plan  Secondary to progression of end-stage renal disease  Hyperkalemia resolved discussed with nephrology bicarb drip and Lokelma discontinued     discussed with the patient that he has progressed to ESRD and that he will likely need  hemodialysis  Patient initially declining hemodialysis, he has changed his mind now and agrees to starting dialysis  No hospice at this time  Permacath ordered, will need outpatient dialysis chair set up as well prior to discharge    11/26/23  Resolved with dialysis.  Potassium of 4.0 today.    ESRD (end stage renal disease) on dialysis (HCC)- (present on admission)  Assessment & Plan  11/26/23  Underwent dialysis today 1 L UF.  Awaiting community dialysis chair acceptance.  Medically cleared to discharge.    Psychophysiologic insomnia- (present on admission)  Assessment & Plan  11/25/23  Declined medications.    Advance care planning  Assessment & Plan  At this time patient is interested in hospice referral which has been ordered    Noncompliance  Assessment & Plan  Patient has not followed up with cardiology or nephrology as outpatient and was stretching his outpatient medications  Counseled on compliance with medical therapy        Hyponatremia  Assessment & Plan  Sodium 134    Monitor electrolytes    Metabolic acidosis  Assessment & Plan  Secondary to renal failure      Anemia due to chronic kidney disease  Assessment & Plan  No clinical signs of bleeding  Overnight drop likely dilutional    Hypertension- (present on admission)  Assessment & Plan  Increase hydralazine and monitor blood pressure    Acute renal failure superimposed on chronic kidney disease (HCC)  Assessment & Plan  Likely progressed to end-stage renal disease    Chronic systolic congestive heart failure (HCC)- (present on admission)  Assessment & Plan  Given hyperkalemia losartan and spironolactone were discontinued  Given bradycardia carvedilol was discontinued we will consider resuming beta-blocker if heart rate remains stable  Transition to PO lasix  Resume home coreg    Type 2 diabetes mellitus (HCC)- (present on admission)  Assessment & Plan  Continue insulin sliding scale and monitor CBGs   hemoglobin A1c 5.8         VTE prophylaxis:     heparin ppx

## 2023-11-27 NOTE — PROGRESS NOTES
[facility] Medicine Daily Progress Note    Date of Service  11/27/2023    Chief Complaint  Donte Horta is a 63 y.o. male admitted 11/20/2023 with dyspnea    Hospital Course  3 y.o. male who presented 11/20/2023 with history of hypertension cardiomyopathy EF 25-30% type 2 diabetes chronic kidney disease with prior biopsy revealing advanced diabetic glomerulosclerosis and history of noncompliance presented to the emergency department for evaluation of dyspnea and was noted to have worsening renal function and hyperkalemia K 8.0 .  He was bradycardic and received atropine and started on IV bicarb pushes and subsequent bicarb drip and Lokelma and diuretics and admitted to the intensive care unit.     The patient reports that he was getting low on his home medications and was stretching the doses so he does not run out.  He has not followed up with outpatient cardiology and nephrology due to transportation issues and concerns about cost.      Patient was initially hesitant to initiate dialysis.  However, he conceded and underwent dialysis.  A right tunneled dialysis catheter was placed.  He was weaned off oxygen will continue dialysis sessions.    Interval Problem Update  No acute events overnight.  Permacath ordered for hemodialysis, may not get it until Monday given holiday weekend.  Will need outpatient dialysis chair set up.  Monitor daily metabolic panel. K normal today.    Above per previous hospitalist.    11/25/23  Complaining of insomnia.  Declining medications for sleep.  Permacath placed yesterday.  Undergoing hemodialysis today, net UF of 1 L.  Patient has no new complaints is feeling well.  Awaiting dialysis chair.  Discussed with case management.  Blood pressures improving after dialysis.  Potassium and stable at 4.1.    11/26/23  No new complaints.  Continues on 2 LPM oxygen via nasal cannula.  Sodium of 129.  No new weakness.  Potassium of 4.0.  Calcium corrected low at 7.7.  I have ordered calcium  gluconate 2 g IV and started Tums x 2 times a day.  Patient is awaiting hemodialysis chair in the community.  He is medically clear for discharge.    11/27/23  Wean down to room air.  No new complaints.  Sodium improving to 132.  Undergoing dialysis today.  Awaiting to community dialysis chair.  He is medically clear for discharge.      I have discussed this patient's plan of care and discharge plan at IDT rounds today with Case Management, Nursing, Nursing leadership, and other members of the IDT team.    Consultants/Specialty  critical care and nephrology    Code Status  DNAR/DNI    Disposition  The patient is not medically cleared for discharge to home or a post-acute facility.  Anticipate discharge to: home with close outpatient follow-up    I have placed the appropriate orders for post-discharge needs.    Review of Systems  Review of Systems   Constitutional:  Negative for chills and fever.   Respiratory:  Negative for cough and shortness of breath.    Cardiovascular:  Negative for chest pain and palpitations.   Gastrointestinal:  Negative for abdominal pain, nausea and vomiting.   Genitourinary:  Negative for dysuria and hematuria.   Musculoskeletal:  Negative for joint pain and myalgias.   Neurological:  Negative for dizziness and headaches.   Psychiatric/Behavioral:  The patient has insomnia.         Physical Exam  Temp:  [36.2 °C (97.1 °F)-36.8 °C (98.3 °F)] 36.8 °C (98.2 °F)  Pulse:  [63-72] 63  Resp:  [16-18] 16  BP: (112-145)/(53-63) 145/63  SpO2:  [92 %-96 %] 96 %    Physical Exam  Vitals and nursing note reviewed.   Constitutional:       General: He is not in acute distress.     Appearance: Normal appearance. He is not ill-appearing.   HENT:      Head: Normocephalic and atraumatic.      Mouth/Throat:      Mouth: Mucous membranes are moist.      Pharynx: Oropharynx is clear. No oropharyngeal exudate.   Eyes:      General: No scleral icterus.        Right eye: No discharge.         Left eye: No  discharge.      Conjunctiva/sclera: Conjunctivae normal.   Cardiovascular:      Rate and Rhythm: Normal rate and regular rhythm.      Pulses: Normal pulses.      Heart sounds: Normal heart sounds. No murmur heard.     Comments: Right subclavian dialysis catheter  Pulmonary:      Effort: Pulmonary effort is normal. No respiratory distress.      Breath sounds: Decreased breath sounds present.   Abdominal:      General: Abdomen is flat. Bowel sounds are normal. There is no distension.      Palpations: Abdomen is soft.   Musculoskeletal:         General: No swelling.      Cervical back: Neck supple. No tenderness.      Right lower leg: No edema.      Left lower leg: No edema.   Skin:     General: Skin is warm and dry.      Coloration: Skin is not pale.   Neurological:      Mental Status: He is alert and oriented to person, place, and time. Mental status is at baseline.      Motor: No weakness.   Psychiatric:         Thought Content: Thought content normal.         Judgment: Judgment normal.         Fluids    Intake/Output Summary (Last 24 hours) at 11/27/2023 0931  Last data filed at 11/26/2023 1508  Gross per 24 hour   Intake 980 ml   Output 1500 ml   Net -520 ml         Laboratory        Recent Labs     11/25/23  0241 11/26/23  0128 11/27/23  0123   SODIUM 130* 129* 132*   POTASSIUM 4.1 4.0 4.0   CHLORIDE 94* 93* 96   CO2 22 26 26   GLUCOSE 117* 118* 119*   BUN 61* 39* 24*   CREATININE 6.65* 5.39* 4.29*   CALCIUM 7.2* 7.2* 7.8*                     Imaging  IR-MOELLER,GROSHONG PLACEMENT < 5   Final Result      1. ULTRASOUND AND FLUOROSCOPIC GUIDED PLACEMENT OF A Right INTERNAL JUGULAR 14.5 [place] HemoSplit TUNNELED DIALYSIS CATHETER.      2. THE HEMODIALYSIS CATHETER MAY BE USED IMMEDIATELY AS CLINICALLY INDICATED. FLUSHES PER PROTOCOL.         DX-CHEST-PORTABLE (1 VIEW)   Final Result      1.  Marked enlargement of cardiac silhouette      2.  Bilateral atelectasis      3.  Probable small right pleural effusion      4.   Hyperinflation consistent with chronic obstructive pulmonary disease           Assessment/Plan  * Hyperkalemia- (present on admission)  Assessment & Plan  Secondary to progression of end-stage renal disease  Hyperkalemia resolved discussed with nephrology bicarb drip and Lokelma discontinued     discussed with the patient that he has progressed to ESRD and that he will likely need hemodialysis  Patient initially declining hemodialysis, he has changed his mind now and agrees to starting dialysis  No hospice at this time  Permacath ordered, will need outpatient dialysis chair set up as well prior to discharge    11/26/23  Resolved with dialysis.  Potassium of 4.0 today.    ESRD (end stage renal disease) on dialysis (HCC)- (present on admission)  Assessment & Plan  11/26/23  Underwent dialysis today 1 L UF.  Awaiting community dialysis chair acceptance.  Medically cleared to discharge.    11/27/23  Underwent dialysis today    Awaiting community dialysis chair acceptance.  Medically cleared to discharge.    Hyponatremia  Assessment & Plan  11/27/23  Sodium improved to 132.  Continue to monitor closely.    Psychophysiologic insomnia- (present on admission)  Assessment & Plan  11/25/23  Declined medications.    Advance care planning  Assessment & Plan  At this time patient is interested in hospice referral which has been ordered    Noncompliance  Assessment & Plan  Patient has not followed up with cardiology or nephrology as outpatient and was stretching his outpatient medications  Counseled on compliance with medical therapy        Metabolic acidosis  Assessment & Plan  Secondary to renal failure      Anemia due to chronic kidney disease  Assessment & Plan  No clinical signs of bleeding  Overnight drop likely dilutional    Hypertension- (present on admission)  Assessment & Plan  Increase hydralazine and monitor blood pressure    Acute renal failure superimposed on chronic kidney disease (HCC)  Assessment & Plan  Likely  progressed to end-stage renal disease    Chronic systolic congestive heart failure (HCC)- (present on admission)  Assessment & Plan  Given hyperkalemia losartan and spironolactone were discontinued  Given bradycardia carvedilol was discontinued we will consider resuming beta-blocker if heart rate remains stable  Transition to PO lasix  Resume home coreg    Type 2 diabetes mellitus (HCC)- (present on admission)  Assessment & Plan  Continue insulin sliding scale and monitor CBGs   hemoglobin A1c 5.8    11/27/23  Blood glucose controlled.  Continue lispro insulin sign scale         VTE prophylaxis:    heparin ppx

## 2023-11-27 NOTE — CARE PLAN
The patient is Stable - Low risk of patient condition declining or worsening    Shift Goals  Clinical Goals: Dialysis, comfort, safety  Patient Goals: Sleep through night  Family Goals: Not present    Progress made toward(s) clinical / shift goals:  patient medicated for sleep as per MAR, dialysis planned for AM    Problem: Knowledge Deficit - Standard  Goal: Patient and family/care givers will demonstrate understanding of plan of care, disease process/condition, diagnostic tests and medications  Outcome: Progressing  Note: Patient will verbalize understanding of education provided throughout the shift on nursing activities such as assessment and medication administration.     Problem: Communication  Goal: The ability to communicate needs accurately and effectively will improve  Outcome: Progressing  Note: For the duration of the shifts, patient will verbalize needs related to comfort and voice any questions, comments, or concerns.      Problem: Fluid Volume  Goal: Fluid volume balance will be maintained  Outcome: Progressing  Note: Patient will not show any signs or symptoms of fluid volume overload this shift.     Patient is not progressing towards the following goals:

## 2023-11-27 NOTE — PROGRESS NOTES
[facility] Services Progress Note     Hemodialysis treatment #3 ordered today per Dr. Bonilla Alvarez x 3 hours.   Received via bed awake and oriented x 4; on room air; denies pain  Treatment initiated at 1224 completed at 1524.      Patient tolerated treatment; see electronic flow sheet for details.      Net UF 1000 mL.      Post tx, CVC flushed with saline then locked with heparin 1000 units/mL per designated amount in each wing then clamped and capped. Aspirate heparin prior to next CVC use.     Report given to Primary RN Bob López .

## 2023-11-27 NOTE — ASSESSMENT & PLAN NOTE
Right IJ TDC was placed on 11/24, continuing with MWF schedule.  Pending outpatient hemodialysis chair  QuantiFERON gold pending

## 2023-11-27 NOTE — DISCHARGE PLANNING
Case Management Discharge Planning    Admission Date: 11/20/2023  GMLOS: 3.6  ALOS: 6    6-Clicks ADL Score:    6-Clicks Mobility Score:        Anticipated Discharge Dispo: Discharge Disposition: Discharged to home/self care (01)  Discharge Address: [address] [place] [address]  Discharge Contact Phone Number: [phone]    DME Needed: No    Action(s) Taken: Updated Provider/Nurse on Discharge Plan  Discussed with MD in rounds this date, pt is medically cleared for DC once OP HD chair is secured  Spoke with FRANCISCO Plascencia CM ([phone]) - she is attempting to secure OP HD chair. She reports this can take 24-48 hours or longer, pending the completion of the needed documentation  UPDATE at 1400: Thais reports she could not access the EHR - RN CM faxed her the needed documentation at [phone] (no H&P available, faxed 11/21 Critical Care consult, 11/21 Hospitalist consult note, lab results for CBS, metabolic panel, Hep B results, 11/26 and 11/27 Nephrology progress notes, 11/21 Nephrology consult, 11/24 HD access creation/vascular report, 11/20 CXR, 11/25 and 11/26 dialysis treatment nurse notes)    Escalations Completed: None    Medically Clear: Yes - pending OP HD chair securement    Next Steps:   F/u with FRANCISCO Plascencia CM ([phone]), for securement of the OP HD chair     Barriers to Discharge: Dialysis    Is the patient up for discharge tomorrow: No

## 2023-11-27 NOTE — PROGRESS NOTES
Assumed care of patient. Assessment performed. Call light and belongings within reach. All needs met at this time.

## 2023-11-27 NOTE — PROGRESS NOTES
"[facility] -  PROGRESS NOTE               Author: Bonilla Alvarez M.D. Date & Time: 11/27/2023  8:35 AM     HPI:  Mr. 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 He reports having \"cold like symptoms\" with productive cough and SOB that he describes as inability to take a full breath worse on exertion and lying down flat. He denies chest pain, palpitations, headaches, profuse sweating, fevers or chills, etc.      He reported taking half the dose of his home meds for a few weeks prior to current hospitalization in order to make them last till his doctor appt. He restarted taking full doses of his meds including aldactone, losartan, amlodipine, atorva, and coreg on Saturday or Sunday which is when his sxs of SOB also seem to have started.     He was found to have hyperkalemia with K of 8, Scr of 8 similar to prior known Scr in 2021. Labs also showed HAGMA, with bicarb of 7-9 at admission. He refuses HD due to personal preferences. Hence hyperkalemia was managed medically and nephrology was consulted for management of hyperkalemia and CKD 5 which is progressing over the years.     DAILY NEPHROLOGY SUMMARY:  11/21: Initial consult done. K and metabolic acidosis improved with meds, sxs of SOB also improved with tx. Seen by palliative care for transition to hospice at discharge.   11/22: No overnight events. Weaning off O2. Hyperkalemia and HAGMA have resolved on labs this morning. Discontinued Lokelma (K <4) and bicarb (CO2 21).   11/23: pt had declined RRT and was leaning towards hospice but his family talked him into trying RRT, we discussed different options, he does live in " [place] so is interested in home therapy, poor appetite, some nausea this am, + SOB with exertion, no edema   11/24: Feeling well without CP SOB or worsening edema.  Getting permcath today.  VSS.  Labs unchanged  11/25: Seen   11/26: HD yesterday net UF 1L.   VSS 2L NC.  No CP SOB edema.  Seen on HD feeling well, feels \"bloated\" today.   11/27: Tolerated HD, stable hemodynamics, denies complaints,      REVIEW OF SYSTEMS:    10 point ROS reviewed and is as per HPI/daily summary or otherwise negative    PMH/PSH/SH/FH:   Reviewed and unchanged since admission note    CURRENT MEDICATIONS:   Reviewed from admission to present day    VS:  VS:  BP (!) 145/63   Pulse 63   Temp 36.8 °C (98.2 °F) (Temporal)   Resp 16   Ht 1.778 m (5' 10\")   Wt 104 kg (228 lb 2.8 oz)   SpO2 96%   BMI 32.74 kg/m²   GENERAL: no acute distress  CV: No pedal edema  RESP: non-labored  GI: Soft  MSK: No joint deformities   SKIN: No concerning rashes  NEURO: AOx3  PSYCH: Cooperative    Fluids:  In: 980 [P.O.:480; Dialysis:500]  Out: 1500     LABS:  Recent Labs     11/25/23  0241 11/26/23  0128 11/27/23  0123   SODIUM 130* 129* 132*   POTASSIUM 4.1 4.0 4.0   CHLORIDE 94* 93* 96   CO2 22 26 26   GLUCOSE 117* 118* 119*   BUN 61* 39* 24*   CREATININE 6.65* 5.39* 4.29*   CALCIUM 7.2* 7.2* 7.8*         IMAGING:  Reviewed results of the CXR. On my personal review, the cardiac silhouette appears slightly enlarged with blunting of b/l costophrenic angles, worse on the left side.      ASSESSMENTS  # CKD stage 5 - New ESRD  SCr 8.07 this admission, similar to last known baseline SCr from 2021  Renal biopsy in 08/2021 showed advanced diabetic glomerulosclerosis class IV  Originally declined RRT then wanted  interested in home therapy   Now dialysis dependent  # Hyperkalemia, Resolved  Likely secondary to renal insufficiency and home meds.   K of 8 at admission, now down to 3.8-4.2 on most recent labs.  Declined HD at admission, hence medically managed. "   This admission, he has received IV Ca chloride, albuterol neb,   IV lasix 80 BID, sodium bicarb 100mEq and then continuous infusion,   and lokelma 10g daily.   # HAGMA,  Resolved  Appropriately compensated respiratory alkalosis.   Resolved with sodium bicarb infusion, which was discontinued on 11/22.   # Sinus bradycardia ,  Resolved  Likely secondary to hyperkalemia. Resolved with improvement in K levels.   # Type 2 diabetes mellitus with diabetic nephropathy  # Hypertension, controlled   Goal BP < 140/90  lasix, hydralazine  # Anemia of CKD, sub-optimal goal  Goal Hgb 10-11  Ferritin 175  Iron 25  %sat 13  start RANDY with iHD   Pharm to dose IV iron replacement   # BMD-CKD    Ca+: 7.1, 6.9  Phos 3.9-> 3.8    Vit D 19      SUGGESTIONS:  HD again today for 3rd day in a row and tehn to MWF schedule  Interested in home therapy  Will work on as outpt and will notify [facility] for home eval   Refer for AVF/PD cath as outpt   On PO ergo  IV iron replacement  Avoid ARBs/ACEi for now until start hemodialysis  Renal diet  Avoid NSAIDs, Nephrotoxins  Will need outpt chair set up prior to discharge,   prefers [facility] they think, case management consulted     Thank you

## 2023-11-28 LAB — GLUCOSE BLD STRIP.AUTO-MCNC: 130 MG/DL (ref 65–99)

## 2023-11-28 PROCEDURE — 700102 HCHG RX REV CODE 250 W/ 637 OVERRIDE(OP): Performed by: GENERAL PRACTICE

## 2023-11-28 PROCEDURE — 82962 GLUCOSE BLOOD TEST: CPT

## 2023-11-28 PROCEDURE — 700111 HCHG RX REV CODE 636 W/ 250 OVERRIDE (IP)

## 2023-11-28 PROCEDURE — RXMED WILLOW AMBULATORY MEDICATION CHARGE: Performed by: GENERAL PRACTICE

## 2023-11-28 PROCEDURE — A9270 NON-COVERED ITEM OR SERVICE: HCPCS | Performed by: STUDENT IN AN ORGANIZED HEALTH CARE EDUCATION/TRAINING PROGRAM

## 2023-11-28 PROCEDURE — 700102 HCHG RX REV CODE 250 W/ 637 OVERRIDE(OP): Performed by: STUDENT IN AN ORGANIZED HEALTH CARE EDUCATION/TRAINING PROGRAM

## 2023-11-28 PROCEDURE — A9270 NON-COVERED ITEM OR SERVICE: HCPCS | Performed by: GENERAL PRACTICE

## 2023-11-28 PROCEDURE — 700102 HCHG RX REV CODE 250 W/ 637 OVERRIDE(OP): Performed by: NURSE PRACTITIONER

## 2023-11-28 PROCEDURE — A9270 NON-COVERED ITEM OR SERVICE: HCPCS | Performed by: HOSPITALIST

## 2023-11-28 PROCEDURE — 770006 HCHG ROOM/CARE - MED/SURG/GYN SEMI*

## 2023-11-28 PROCEDURE — 36415 COLL VENOUS BLD VENIPUNCTURE: CPT

## 2023-11-28 PROCEDURE — A9270 NON-COVERED ITEM OR SERVICE: HCPCS

## 2023-11-28 PROCEDURE — 86480 TB TEST CELL IMMUN MEASURE: CPT

## 2023-11-28 PROCEDURE — 99232 SBSQ HOSP IP/OBS MODERATE 35: CPT | Performed by: GENERAL PRACTICE

## 2023-11-28 PROCEDURE — A9270 NON-COVERED ITEM OR SERVICE: HCPCS | Performed by: NURSE PRACTITIONER

## 2023-11-28 PROCEDURE — 700102 HCHG RX REV CODE 250 W/ 637 OVERRIDE(OP): Performed by: HOSPITALIST

## 2023-11-28 PROCEDURE — 700102 HCHG RX REV CODE 250 W/ 637 OVERRIDE(OP)

## 2023-11-28 RX ORDER — ERGOCALCIFEROL 1.25 MG/1
50000 CAPSULE ORAL
Qty: 8 CAPSULE | Refills: 0 | Status: SHIPPED | OUTPATIENT
Start: 2023-12-01 | End: 2024-01-26

## 2023-11-28 RX ORDER — FUROSEMIDE 40 MG/1
40 TABLET ORAL 2 TIMES DAILY
Qty: 60 TABLET | Refills: 0 | Status: SHIPPED | OUTPATIENT
Start: 2023-11-29 | End: 2023-11-29

## 2023-11-28 RX ORDER — ATORVASTATIN CALCIUM 40 MG/1
40 TABLET, FILM COATED ORAL NIGHTLY
Status: DISCONTINUED | OUTPATIENT
Start: 2023-11-28 | End: 2023-11-30 | Stop reason: HOSPADM

## 2023-11-28 RX ORDER — GUAIFENESIN 600 MG/1
1200 TABLET, EXTENDED RELEASE ORAL EVERY 12 HOURS
Status: DISCONTINUED | OUTPATIENT
Start: 2023-11-28 | End: 2023-11-30 | Stop reason: HOSPADM

## 2023-11-28 RX ORDER — HYDRALAZINE HYDROCHLORIDE 25 MG/1
25 TABLET, FILM COATED ORAL EVERY 8 HOURS
Qty: 90 TABLET | Refills: 0 | Status: SHIPPED | OUTPATIENT
Start: 2023-11-29 | End: 2023-12-30

## 2023-11-28 RX ADMIN — CARVEDILOL 25 MG: 25 TABLET, FILM COATED ORAL at 07:58

## 2023-11-28 RX ADMIN — ANTACID TABLETS 500 MG: 500 TABLET, CHEWABLE ORAL at 04:01

## 2023-11-28 RX ADMIN — FUROSEMIDE 40 MG: 40 TABLET ORAL at 15:51

## 2023-11-28 RX ADMIN — FUROSEMIDE 40 MG: 40 TABLET ORAL at 04:01

## 2023-11-28 RX ADMIN — ATORVASTATIN CALCIUM 40 MG: 40 TABLET, FILM COATED ORAL at 20:46

## 2023-11-28 RX ADMIN — HEPARIN SODIUM 5000 UNITS: 5000 INJECTION, SOLUTION INTRAVENOUS; SUBCUTANEOUS at 17:22

## 2023-11-28 RX ADMIN — HYDRALAZINE HYDROCHLORIDE 25 MG: 50 TABLET, FILM COATED ORAL at 20:46

## 2023-11-28 RX ADMIN — SENNOSIDES AND DOCUSATE SODIUM 1 TABLET: 50; 8.6 TABLET ORAL at 04:06

## 2023-11-28 RX ADMIN — HYDRALAZINE HYDROCHLORIDE 25 MG: 50 TABLET, FILM COATED ORAL at 04:01

## 2023-11-28 RX ADMIN — ACETAMINOPHEN 650 MG: 325 TABLET, FILM COATED ORAL at 04:04

## 2023-11-28 RX ADMIN — GUAIFENESIN 1200 MG: 600 TABLET, EXTENDED RELEASE ORAL at 13:15

## 2023-11-28 RX ADMIN — HYDRALAZINE HYDROCHLORIDE 25 MG: 50 TABLET, FILM COATED ORAL at 13:15

## 2023-11-28 RX ADMIN — CARVEDILOL 25 MG: 25 TABLET, FILM COATED ORAL at 17:21

## 2023-11-28 ASSESSMENT — PAIN DESCRIPTION - PAIN TYPE: TYPE: ACUTE PAIN

## 2023-11-28 NOTE — PROGRESS NOTES
4 Eyes Skin Assessment Completed by FABIANA Rojas and FABIANA Ventura.    Head WDL  Ears WDL  Nose WDL  Mouth WDL  Neck WDL  Breast/Chest WDL  Shoulder Blades WDL  Spine WDL  (R) Arm/Elbow/Hand WDL  (L) Arm/Elbow/Hand WDL  Abdomen WDL  Groin WDL  Scrotum/Coccyx/Buttocks WDL  (R) Leg WDL  (L) Leg WDL  (R) Heel/Foot/Toe WDL  (L) Heel/Foot/Toe WDL          Devices In Places Central Line      Interventions In Place Pillows    Possible Skin Injury No    Pictures Uploaded Into Epic N/A  Wound Consult Placed N/A  RN Wound Prevention Protocol Ordered No

## 2023-11-28 NOTE — PROGRESS NOTES
"[facility] -  PROGRESS NOTE               Author: Bonilla Alvarez M.D. Date & Time: 11/28/2023  8:14 AM     HPI:  Mr. 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 He reports having \"cold like symptoms\" with productive cough and SOB that he describes as inability to take a full breath worse on exertion and lying down flat. He denies chest pain, palpitations, headaches, profuse sweating, fevers or chills, etc.      He reported taking half the dose of his home meds for a few weeks prior to current hospitalization in order to make them last till his doctor appt. He restarted taking full doses of his meds including aldactone, losartan, amlodipine, atorva, and coreg on Saturday or Sunday which is when his sxs of SOB also seem to have started.     He was found to have hyperkalemia with K of 8, Scr of 8 similar to prior known Scr in 2021. Labs also showed HAGMA, with bicarb of 7-9 at admission. He refuses HD due to personal preferences. Hence hyperkalemia was managed medically and nephrology was consulted for management of hyperkalemia and CKD 5 which is progressing over the years.     DAILY NEPHROLOGY SUMMARY:  11/21: Initial consult done. K and metabolic acidosis improved with meds, sxs of SOB also improved with tx. Seen by palliative care for transition to hospice at discharge.   11/22: No overnight events. Weaning off O2. Hyperkalemia and HAGMA have resolved on labs this morning. Discontinued Lokelma (K <4) and bicarb (CO2 21).   11/23: pt had declined RRT and was leaning towards hospice but his family talked him into trying RRT, we discussed different options, he does live in " [place] so is interested in home therapy, poor appetite, some nausea this am, + SOB with exertion, no edema   11/24: Feeling well without CP SOB or worsening edema.  Getting permcath today.  VSS.  Labs unchanged  11/25: Seen   11/26: HD yesterday net UF 1L.   VSS 2L NC.  No CP SOB edema.  Seen on HD feeling well, feels \"bloated\" today.   11/27: Tolerated HD, stable hemodynamics, denies complaints  11/28: No acute events,  some OSB this AM, low BG level    REVIEW OF SYSTEMS:    10 point ROS reviewed and is as per HPI/daily summary or otherwise negative    PMH/PSH/SH/FH:   Reviewed and unchanged since admission note    CURRENT MEDICATIONS:   Reviewed from admission to present day    VS:  VS:  /56   Pulse 65   Temp 36.9 °C (98.4 °F) (Temporal)   Resp 16   Ht 1.778 m (5' 10\")   Wt 104 kg (228 lb 2.8 oz)   SpO2 94%   BMI 32.74 kg/m²   GENERAL: no acute distress  CV: No pedal edema  RESP: non-labored  GI: Soft  MSK: No joint deformities   SKIN: No concerning rashes  NEURO: AOx3  PSYCH: Cooperative    Fluids:  In: 1920 [P.O.:1420; Dialysis:500]  Out: 1500     LABS:  Recent Labs     11/26/23  0128 11/27/23  0123   SODIUM 129* 132*   POTASSIUM 4.0 4.0   CHLORIDE 93* 96   CO2 26 26   GLUCOSE 118* 119*   BUN 39* 24*   CREATININE 5.39* 4.29*   CALCIUM 7.2* 7.8*         IMAGING:  Reviewed results of the CXR. On my personal review, the cardiac silhouette appears slightly enlarged with blunting of b/l costophrenic angles, worse on the left side.      ASSESSMENTS  # CKD stage 5 - New ESRD  SCr 8.07 this admission, similar to last known baseline SCr from 2021  Renal biopsy in 08/2021 showed advanced diabetic glomerulosclerosis class IV  Originally declined RRT then wanted  interested in home therapy   Now dialysis dependent  # Hyperkalemia, Resolved  Likely secondary to renal insufficiency and home meds.   K of 8 at admission, now down to 3.8-4.2 on most recent labs.  Declined HD at admission, hence medically managed. "   This admission, he has received IV Ca chloride, albuterol neb,   IV lasix 80 BID, sodium bicarb 100mEq and then continuous infusion,   and lokelma 10g daily.   # HAGMA,  Resolved  Appropriately compensated respiratory alkalosis.   Resolved with sodium bicarb infusion, which was discontinued on 11/22.   # Sinus bradycardia ,  Resolved  Likely secondary to hyperkalemia. Resolved with improvement in K levels.   # Type 2 diabetes mellitus with diabetic nephropathy  # Hypertension, controlled   Goal BP < 140/90  lasix, hydralazine  # Anemia of CKD, sub-optimal goal  Goal Hgb 10-11  Ferritin 175  Iron 25  %sat 13  start RANDY with iHD   Pharm to dose IV iron replacement   # BMD-CKD    Ca+: 7.1, 6.9  Phos 3.9-> 3.8    Vit D 19      SUGGESTIONS:  No HD today, next session on Wed for MWF schedule  Interested in home therapy  Will work on as outpt for home eval   Refer for AVF/PD cath as outpt   On PO ergo  IV iron replacement  Avoid ARBs/ACEi for now until start hemodialysis  Renal diet  Avoid NSAIDs, Nephrotoxins  Will need outpt chair set up prior to discharge,   prefers [facility] they think, case management consulted     Thank you

## 2023-11-28 NOTE — PROGRESS NOTES
Pt returned from dialysis at 1600H. A&Ox4. Up to the bathroom. Catheter dressing intact. Denies pain and n/v. Vitals stable.

## 2023-11-28 NOTE — DISCHARGE PLANNING
Case Management Discharge Planning    Admission Date: 11/20/2023  GMLOS: 3.6  ALOS: 7    6-Clicks ADL Score:    6-Clicks Mobility Score:        Anticipated Discharge Dispo: Discharge Disposition: Discharged to home/self care (01)  Discharge Address: [address] [place] [id number]  Discharge Contact Phone Number: [phone]    DME Needed: No    Action(s) Taken: Updated Provider/Nurse on Discharge Plan    0900: Discussed discharge planning with Hospitalist. Pending outpatient dialysis chair. FABIANA THOMAS called Pey[facility] ([phone]), Dialysis Coordinator. No answer, left voicemail for call back.    Per flowsheet, pt was placed on 1 LPM O2 this morning. Plan to wean O2 vs home O2 eval and DME order, if needed.    1130: Discussed discharge planning needs during rounds. Pt still on 1 LPM O2. Waiting for call back from Dialysis Coordinator.    RN CM spoke to pt at bedside. Per pt, he prefers a dialysis clinic close to where he lives. Pt reports his daughter can provide transportation at discharge.     Pt's home address:  [address] [place] [id number]    Addendum:  Received call from Peyton, Dialysis Coordinator, that there is an accepting dialysis clinic but she needs to talk to the pt regarding appointments and transportation. RN CM spoke to pt at bedside. Per pt, he was informed that coordinator will try to look for a different appointment time. Per pt, his spouse can help provide transportation depending on appointment time. Per pt, any clinic in the [facility] area is okay, as long as the time is not too early. Peyton updated.    Escalations Completed: None    Medically Clear: No    Next Steps:   Follow up with Peyton ([phone]), Dialysis Coordinator for outpatient dialysis chair set up.  Home O2 eval and DME order, if appropriate.     Barriers to Discharge: Medical clearance and Dialysis    Is the patient up for discharge tomorrow: No

## 2023-11-28 NOTE — CARE PLAN
The patient is Stable - Low risk of patient condition declining or worsening    Shift Goals  Clinical Goals: safety  Patient Goals: rest  Family Goals: Not present    Progress made toward(s) clinical / shift goals:  patient A&O X3, amb. Resting in bed comfortably. No complaints.     Patient is not progressing towards the following goals:

## 2023-11-28 NOTE — CARE PLAN
The patient is Stable - Low risk of patient condition declining or worsening    Shift Goals  Clinical Goals: Pt will be free from injury for the next 12 hours  Patient Goals: Sleep through night  Family Goals: Not present    Progress made toward(s) clinical / shift goals: Pt A&Ox4. Able to make needs known. Routine medications given. Vitals stable. No c/o pain or any discomfort. Blood sugar level monitoring done and insulin given as needed.        Problem: Knowledge Deficit - Standard  Goal: Patient and family/care givers will demonstrate understanding of plan of care, disease process/condition, diagnostic tests and medications  Outcome: Progressing     Problem: Skin Integrity  Goal: Skin integrity is maintained or improved  Outcome: Met     Problem: Fall Risk  Goal: Patient will remain free from falls  Outcome: Met       Patient is not progressing towards the following goals:

## 2023-11-29 ENCOUNTER — APPOINTMENT (OUTPATIENT)
Dept: RADIOLOGY | Facility: MEDICAL CENTER | Age: 63
DRG: 640 | End: 2023-11-29
Attending: GENERAL PRACTICE
Payer: MEDICAID

## 2023-11-29 PROBLEM — I82.612 SUPERFICIAL VENOUS THROMBOSIS OF ARM, LEFT: Status: ACTIVE | Noted: 2023-11-29

## 2023-11-29 PROBLEM — J96.01 ACUTE HYPOXEMIC RESPIRATORY FAILURE (HCC): Status: ACTIVE | Noted: 2023-11-29

## 2023-11-29 LAB
ANION GAP SERPL CALC-SCNC: 12 MMOL/L (ref 7–16)
BUN SERPL-MCNC: 24 MG/DL (ref 8–22)
CALCIUM SERPL-MCNC: 8.1 MG/DL (ref 8.5–10.5)
CHLORIDE SERPL-SCNC: 93 MMOL/L (ref 96–112)
CHOLEST SERPL-MCNC: 121 MG/DL (ref 100–199)
CO2 SERPL-SCNC: 24 MMOL/L (ref 20–33)
CREAT SERPL-MCNC: 4.55 MG/DL (ref 0.5–1.4)
ERYTHROCYTE [DISTWIDTH] IN BLOOD BY AUTOMATED COUNT: 44.7 FL (ref 35.9–50)
FLUAV RNA SPEC QL NAA+PROBE: NEGATIVE
FLUBV RNA SPEC QL NAA+PROBE: NEGATIVE
GAMMA INTERFERON BACKGROUND BLD IA-ACNC: 0.06 IU/ML
GFR SERPLBLD CREATININE-BSD FMLA CKD-EPI: 14 ML/MIN/1.73 M 2
GLUCOSE SERPL-MCNC: 147 MG/DL (ref 65–99)
HCT VFR BLD AUTO: 23.9 % (ref 42–52)
HDLC SERPL-MCNC: 29 MG/DL
HGB BLD-MCNC: 8.1 G/DL (ref 14–18)
LDLC SERPL CALC-MCNC: 71 MG/DL
M TB IFN-G BLD-IMP: NEGATIVE
M TB IFN-G CD4+ BCKGRND COR BLD-ACNC: 0 IU/ML
MAGNESIUM SERPL-MCNC: 1.5 MG/DL (ref 1.5–2.5)
MCH RBC QN AUTO: 29.9 PG (ref 27–33)
MCHC RBC AUTO-ENTMCNC: 33.9 G/DL (ref 32.3–36.5)
MCV RBC AUTO: 88.2 FL (ref 81.4–97.8)
MITOGEN IGNF BCKGRD COR BLD-ACNC: >10 IU/ML
PHOSPHATE SERPL-MCNC: 3 MG/DL (ref 2.5–4.5)
PLATELET # BLD AUTO: 228 K/UL (ref 164–446)
PMV BLD AUTO: 9.7 FL (ref 9–12.9)
POTASSIUM SERPL-SCNC: 4 MMOL/L (ref 3.6–5.5)
PROCALCITONIN SERPL-MCNC: 0.43 NG/ML
QFT TB2 - NIL TBQ2: -0.01 IU/ML
RBC # BLD AUTO: 2.71 M/UL (ref 4.7–6.1)
RSV RNA SPEC QL NAA+PROBE: NEGATIVE
SARS-COV-2 RNA RESP QL NAA+PROBE: NOTDETECTED
SODIUM SERPL-SCNC: 129 MMOL/L (ref 135–145)
SPECIMEN SOURCE: NORMAL
TRIGL SERPL-MCNC: 106 MG/DL (ref 0–149)
WBC # BLD AUTO: 14.3 K/UL (ref 4.8–10.8)

## 2023-11-29 PROCEDURE — 0241U HCHG SARS-COV-2 COVID-19 NFCT DS RESP RNA 4 TRGT MIC: CPT

## 2023-11-29 PROCEDURE — 80048 BASIC METABOLIC PNL TOTAL CA: CPT

## 2023-11-29 PROCEDURE — 99233 SBSQ HOSP IP/OBS HIGH 50: CPT | Performed by: GENERAL PRACTICE

## 2023-11-29 PROCEDURE — 700102 HCHG RX REV CODE 250 W/ 637 OVERRIDE(OP): Performed by: HOSPITALIST

## 2023-11-29 PROCEDURE — 700111 HCHG RX REV CODE 636 W/ 250 OVERRIDE (IP)

## 2023-11-29 PROCEDURE — 80061 LIPID PANEL: CPT

## 2023-11-29 PROCEDURE — 93971 EXTREMITY STUDY: CPT | Mod: LT

## 2023-11-29 PROCEDURE — A9270 NON-COVERED ITEM OR SERVICE: HCPCS | Performed by: STUDENT IN AN ORGANIZED HEALTH CARE EDUCATION/TRAINING PROGRAM

## 2023-11-29 PROCEDURE — 84100 ASSAY OF PHOSPHORUS: CPT

## 2023-11-29 PROCEDURE — 85027 COMPLETE CBC AUTOMATED: CPT

## 2023-11-29 PROCEDURE — A9270 NON-COVERED ITEM OR SERVICE: HCPCS

## 2023-11-29 PROCEDURE — 700102 HCHG RX REV CODE 250 W/ 637 OVERRIDE(OP)

## 2023-11-29 PROCEDURE — C9803 HOPD COVID-19 SPEC COLLECT: HCPCS | Performed by: GENERAL PRACTICE

## 2023-11-29 PROCEDURE — 71045 X-RAY EXAM CHEST 1 VIEW: CPT

## 2023-11-29 PROCEDURE — 770006 HCHG ROOM/CARE - MED/SURG/GYN SEMI*

## 2023-11-29 PROCEDURE — 700102 HCHG RX REV CODE 250 W/ 637 OVERRIDE(OP): Performed by: GENERAL PRACTICE

## 2023-11-29 PROCEDURE — A9270 NON-COVERED ITEM OR SERVICE: HCPCS | Performed by: INTERNAL MEDICINE

## 2023-11-29 PROCEDURE — 90935 HEMODIALYSIS ONE EVALUATION: CPT

## 2023-11-29 PROCEDURE — 700102 HCHG RX REV CODE 250 W/ 637 OVERRIDE(OP): Performed by: INTERNAL MEDICINE

## 2023-11-29 PROCEDURE — A9270 NON-COVERED ITEM OR SERVICE: HCPCS | Performed by: GENERAL PRACTICE

## 2023-11-29 PROCEDURE — 700102 HCHG RX REV CODE 250 W/ 637 OVERRIDE(OP): Performed by: STUDENT IN AN ORGANIZED HEALTH CARE EDUCATION/TRAINING PROGRAM

## 2023-11-29 PROCEDURE — A9270 NON-COVERED ITEM OR SERVICE: HCPCS | Performed by: HOSPITALIST

## 2023-11-29 PROCEDURE — 93971 EXTREMITY STUDY: CPT | Mod: 26,LT | Performed by: INTERNAL MEDICINE

## 2023-11-29 PROCEDURE — RXMED WILLOW AMBULATORY MEDICATION CHARGE: Performed by: GENERAL PRACTICE

## 2023-11-29 PROCEDURE — 84145 PROCALCITONIN (PCT): CPT

## 2023-11-29 PROCEDURE — 83735 ASSAY OF MAGNESIUM: CPT

## 2023-11-29 RX ORDER — FUROSEMIDE 80 MG
80 TABLET ORAL 2 TIMES DAILY
Qty: 60 TABLET | Refills: 0 | Status: SHIPPED | OUTPATIENT
Start: 2023-11-30 | End: 2023-12-30

## 2023-11-29 RX ORDER — HEPARIN SODIUM 1000 [USP'U]/ML
INJECTION, SOLUTION INTRAVENOUS; SUBCUTANEOUS
Status: COMPLETED
Start: 2023-11-29 | End: 2023-11-29

## 2023-11-29 RX ORDER — FUROSEMIDE 40 MG/1
80 TABLET ORAL
Status: DISCONTINUED | OUTPATIENT
Start: 2023-11-29 | End: 2023-11-30 | Stop reason: HOSPADM

## 2023-11-29 RX ORDER — AMOXICILLIN AND CLAVULANATE POTASSIUM 500; 125 MG/1; MG/1
1 TABLET, FILM COATED ORAL EVERY 12 HOURS
Status: DISCONTINUED | OUTPATIENT
Start: 2023-11-29 | End: 2023-11-29

## 2023-11-29 RX ADMIN — HEPARIN SODIUM 3700 UNITS: 1000 INJECTION, SOLUTION INTRAVENOUS; SUBCUTANEOUS at 11:27

## 2023-11-29 RX ADMIN — HEPARIN SODIUM 5000 UNITS: 5000 INJECTION, SOLUTION INTRAVENOUS; SUBCUTANEOUS at 16:54

## 2023-11-29 RX ADMIN — HYDRALAZINE HYDROCHLORIDE 25 MG: 50 TABLET, FILM COATED ORAL at 21:03

## 2023-11-29 RX ADMIN — FUROSEMIDE 40 MG: 40 TABLET ORAL at 04:04

## 2023-11-29 RX ADMIN — FUROSEMIDE 80 MG: 40 TABLET ORAL at 16:04

## 2023-11-29 RX ADMIN — HYDRALAZINE HYDROCHLORIDE 25 MG: 50 TABLET, FILM COATED ORAL at 14:31

## 2023-11-29 RX ADMIN — CARVEDILOL 25 MG: 25 TABLET, FILM COATED ORAL at 16:04

## 2023-11-29 RX ADMIN — HEPARIN SODIUM 2000 UNITS: 1000 INJECTION, SOLUTION INTRAVENOUS; SUBCUTANEOUS at 08:23

## 2023-11-29 RX ADMIN — ACETAMINOPHEN 650 MG: 325 TABLET, FILM COATED ORAL at 21:03

## 2023-11-29 RX ADMIN — ATORVASTATIN CALCIUM 40 MG: 40 TABLET, FILM COATED ORAL at 21:03

## 2023-11-29 ASSESSMENT — COGNITIVE AND FUNCTIONAL STATUS - GENERAL
DAILY ACTIVITIY SCORE: 24
MOBILITY SCORE: 23
SUGGESTED CMS G CODE MODIFIER MOBILITY: CI
SUGGESTED CMS G CODE MODIFIER DAILY ACTIVITY: CH
CLIMB 3 TO 5 STEPS WITH RAILING: A LITTLE

## 2023-11-29 ASSESSMENT — ENCOUNTER SYMPTOMS
SPUTUM PRODUCTION: 1
COUGH: 1

## 2023-11-29 ASSESSMENT — PAIN DESCRIPTION - PAIN TYPE
TYPE: ACUTE PAIN
TYPE: ACUTE PAIN

## 2023-11-29 NOTE — PROGRESS NOTES
Hospital Medicine Daily Progress Note    Date of Service  11/29/2023    Chief Complaint  Donte Horta is a 63 y.o. male admitted 11/20/2023 with shortness of breath    Hospital Course  This is a 63-year-old male with past medical history of cardiomyopathy with LVEF of 25%, hypertension, dyslipidemia, type 2 diabetes A1C 5.8, CKD significant for diabetic glomerulosclerosis and noncompliance who was admitted on 11/20/2023 with acute hypoxemic respiratory failure, hyperkalemia and LEANNA on CKD.    On admission, patient was bradycardic requiring atropine, bicarb, bicarb drip, Lokelma, diuretics and ICU admission.  Initially patient was not amenable to hemodialysis, ultimately he decided to continue HD.  Right IJ TDC was placed on 11/24, continuing with MWF schedule.    Interval Problem Update  Patient with productive cough, procalcitonin slightly elevated, patient with leukocytosis.  Chest x-ray with evidence of pulmonary edema. Case discussed with nephrology, Lasix increased to 80 mg twice daily.  Will start patient on empiric Augmentin, renally dosed, due to concern for possible HAP.  COVID, influenza, RSV negative.    Hemodialysis chair confirmed, pending QuantiFERON gold.    Patient noted to have left upper extremity swelling from previous PIV, ultrasound confirms SVT at the cephalic vein.  Supportive management.    I have discussed this patient's plan of care and discharge plan at IDT rounds today with Case Management, Nursing, Nursing leadership, and other members of the IDT team.    Consultants/Specialty  critical care, nephrology, and palliative care    Code Status  DNAR/DNI    Disposition  The patient is not medically cleared for discharge to home or a post-acute facility.  Anticipate discharge to: home with close outpatient follow-up    I have placed the appropriate orders for post-discharge needs.    Review of Systems  Review of Systems   Respiratory:  Positive for cough and sputum production.    All  other systems reviewed and are negative.       Physical Exam  Temp:  [36.4 °C (97.6 °F)-36.9 °C (98.5 °F)] 36.4 °C (97.6 °F)  Pulse:  [69-76] 72  Resp:  [16-17] 16  BP: (127-141)/(54-70) 141/67  SpO2:  [90 %-96 %] 90 %    Physical Exam  Vitals and nursing note reviewed.   Constitutional:       General: He is not in acute distress.     Appearance: Normal appearance.   HENT:      Head: Normocephalic and atraumatic.   Eyes:      Extraocular Movements: Extraocular movements intact.      Conjunctiva/sclera: Conjunctivae normal.      Pupils: Pupils are equal, round, and reactive to light.   Cardiovascular:      Rate and Rhythm: Normal rate and regular rhythm.      Pulses: Normal pulses.      Heart sounds: No murmur heard.     No friction rub. No gallop.      Comments: RIJ TDC -no evidence of erythema or cellulitis  Pulmonary:      Effort: Pulmonary effort is normal. No respiratory distress.      Breath sounds: Rales present. No wheezing or rhonchi.   Abdominal:      General: Bowel sounds are normal. There is no distension.      Palpations: Abdomen is soft.      Tenderness: There is no abdominal tenderness.      Comments: Decreased breath sounds   Musculoskeletal:         General: No swelling or tenderness. Normal range of motion.      Cervical back: Normal range of motion and neck supple. No muscular tenderness.      Right lower leg: No edema.      Left lower leg: No edema.   Skin:     General: Skin is warm and dry.      Capillary Refill: Capillary refill takes less than 2 seconds.      Findings: No bruising, erythema or rash.   Neurological:      General: No focal deficit present.      Mental Status: He is alert and oriented to person, place, and time.         Fluids    Intake/Output Summary (Last 24 hours) at 11/29/2023 1312  Last data filed at 11/29/2023 1124  Gross per 24 hour   Intake 1840 ml   Output 1500 ml   Net 340 ml       Laboratory  Recent Labs     11/29/23  0507   WBC 14.3*   RBC 2.71*   HEMOGLOBIN 8.1*    HEMATOCRIT 23.9*   MCV 88.2   MCH 29.9   MCHC 33.9   RDW 44.7   PLATELETCT 228   MPV 9.7     Recent Labs     11/27/23  0123 11/29/23  0507   SODIUM 132* 129*   POTASSIUM 4.0 4.0   CHLORIDE 96 93*   CO2 26 24   GLUCOSE 119* 147*   BUN 24* 24*   CREATININE 4.29* 4.55*   CALCIUM 7.8* 8.1*             Recent Labs     11/29/23  0507   TRIGLYCERIDE 106   HDL 29*   LDL 71       Imaging  DX-CHEST-PORTABLE (1 VIEW)   Final Result      1.  Increased pulmonary edema   2.  Small BILATERAL pleural effusions   3.  Persistently enlarged cardiac silhouette      US-EXTREMITY VENOUS UPPER UNILAT LEFT   Final Result      IR-MOELLER,GROSHONG PLACEMENT < 5   Final Result      1. ULTRASOUND AND FLUOROSCOPIC GUIDED PLACEMENT OF A Right INTERNAL JUGULAR 14.5 [employer] HemoSplit TUNNELED DIALYSIS CATHETER.      2. THE HEMODIALYSIS CATHETER MAY BE USED IMMEDIATELY AS CLINICALLY INDICATED. FLUSHES PER PROTOCOL.         DX-CHEST-PORTABLE (1 VIEW)   Final Result      1.  Marked enlargement of cardiac silhouette      2.  Bilateral atelectasis      3.  Probable small right pleural effusion      4.  Hyperinflation consistent with chronic obstructive pulmonary disease           Assessment/Plan  * Hyperkalemia- (present on admission)  Assessment & Plan  Secondary to progression of end-stage renal disease  Hyperkalemia resolved discussed with nephrology bicarb drip and Lokelma discontinued     discussed with the patient that he has progressed to ESRD and that he will likely need hemodialysis  Patient initially declining hemodialysis, he has changed his mind now and agrees to starting dialysis  No hospice at this time  Permacath ordered, will need outpatient dialysis chair set up as well prior to discharge    11/26/23  Resolved with dialysis.  Potassium of 4.0 today.    ESRD (end stage renal disease) on dialysis (HCC)- (present on admission)  Assessment & Plan  Right IJ TDC was placed on 11/24, continuing with MWF schedule.  Pending outpatient  hemodialysis chair  QuantiFERON gold pending    Chronic systolic congestive heart failure (HCC)- (present on admission)  Assessment & Plan  Given hyperkalemia losartan and spironolactone were discontinued  Given bradycardia carvedilol was discontinued we will consider resuming beta-blocker if heart rate remains stable  Transition to PO lasix, lasix 80mg BID  Resume home coreg    Acute hypoxemic respiratory failure (HCC)  Assessment & Plan  Patient with productive cough, procalcitonin slightly elevated, patient with leukocytosis.    Chest x-ray with evidence of pulmonary edema.    Lasix increased to 80 mg twice daily.    COVID, influenza, RSV negative.  Will start patient on empiric Augmentin, renally dosed, due to concern for possible HAP.      Superficial venous thrombosis of arm, left  Assessment & Plan  Noted in the left cephalic vein at the level of the wrist  Supportive management    Psychophysiologic insomnia- (present on admission)  Assessment & Plan  11/25/23  Declined medications.    Advance care planning  Assessment & Plan  At this time patient is interested in hospice referral which has been ordered    Noncompliance  Assessment & Plan  Patient has not followed up with cardiology or nephrology as outpatient and was stretching his outpatient medications  Counseled on compliance with medical therapy    Hyponatremia  Assessment & Plan  11/27/23  Sodium improved to 132.  Continue to monitor closely.    Metabolic acidosis  Assessment & Plan  Secondary to renal failure      Anemia due to chronic kidney disease  Assessment & Plan  No clinical signs of bleeding  Overnight drop likely dilutional    Hypertension- (present on admission)  Assessment & Plan  Increase hydralazine and monitor blood pressure    Acute renal failure superimposed on chronic kidney disease (HCC)  Assessment & Plan  Right IJ TDC was placed on 11/24, continuing with MWF schedule.    Type 2 diabetes mellitus (HCC)- (present on  admission)  Assessment & Plan  Continue insulin sliding scale and monitor CBGs   hemoglobin A1c 5.8    11/27/23  Blood glucose controlled.  Continue lispro insulin sign scale         VTE prophylaxis: Heparin prophylaxis    I have performed a physical exam and reviewed and updated ROS and Plan today (11/29/2023). In review of yesterday's note (11/28/2023), there are no changes except as documented above.    Greater than 51 minutes spent prepping to see patient (e.g. reviewing EMR) obtaining and/or reviewing separately obtained history. Performing a medically appropriate examination and evaluation. Independently interpreting results and communicating results to patient/family/caregiver. Counseling and educating the patient/family/caregiver. Ordering medications, tests, and/or procedures. Referring and communicating with other health care professionals.  Documenting clinical information in EPIC. Care coordination.

## 2023-11-29 NOTE — PROGRESS NOTES
3hr HD started @ 0823 and completed @ 1124,tx well tolerated,VSS,net UF = 1000ml as ordered.RIJ TDC dressing CDI,report given to Angel Mccloud RN.

## 2023-11-29 NOTE — PROGRESS NOTES
[facility] Medicine Daily Progress Note    Date of Service  11/28/2023    Chief Complaint  Donte Horta is a 63 y.o. male admitted 11/20/2023 with shortness of breath    Hospital Course  This is a 63-year-old male with past medical history of cardiomyopathy with LVEF of 25%, hypertension, dyslipidemia, type 2 diabetes A1C 5.8, CKD significant for diabetic glomerulosclerosis and noncompliance who was admitted on 11/20/2023 with acute hypoxemic respiratory failure, hyperkalemia and LEANNA on CKD.    On admission, patient was bradycardic requiring atropine, bicarb, bicarb drip, Lokelma, diuretics and ICU admission.  Initially patient was not amenable to hemodialysis, ultimately he decided to continue HD.  Right IJ TDC was placed on 11/24, continuing with MWF schedule.    Interval Problem Update  Patient is currently on 1 L of oxygen, encourage I-S use, encourage ambulation, encourage out of bed to chair.    To continue with MWF HD.    Case management working on hemodialysis chair.    I have discussed this patient's plan of care and discharge plan at IDT rounds today with Case Management, Nursing, Nursing leadership, and other members of the IDT team.    Consultants/Specialty  critical care, nephrology, and palliative care    Code Status  DNAR/DNI    Disposition  The patient is not medically cleared for discharge to home or a post-acute facility.  Anticipate discharge to: home with close outpatient follow-up    I have placed the appropriate orders for post-discharge needs.    Review of Systems  Review of Systems   All other systems reviewed and are negative.       Physical Exam  Temp:  [36.7 °C (98 °F)-36.9 °C (98.5 °F)] 36.9 °C (98.5 °F)  Pulse:  [65-70] 70  Resp:  [16-18] 17  BP: (120-137)/(50-70) 128/70  SpO2:  [90 %-96 %] 96 %    Physical Exam  Vitals and nursing note reviewed.   Constitutional:       General: He is not in acute distress.     Appearance: Normal appearance.   HENT:      Head: Normocephalic and  atraumatic.   Eyes:      Extraocular Movements: Extraocular movements intact.      Conjunctiva/sclera: Conjunctivae normal.      Pupils: Pupils are equal, round, and reactive to light.   Cardiovascular:      Rate and Rhythm: Normal rate and regular rhythm.      Pulses: Normal pulses.      Heart sounds: No murmur heard.     No friction rub. No gallop.      Comments: RIJ TDC -no evidence of erythema or cellulitis  Pulmonary:      Effort: Pulmonary effort is normal. No respiratory distress.      Breath sounds: Normal breath sounds. No wheezing, rhonchi or rales.   Abdominal:      General: Bowel sounds are normal. There is no distension.      Palpations: Abdomen is soft.      Tenderness: There is no abdominal tenderness.   Musculoskeletal:         General: No swelling or tenderness. Normal range of motion.      Cervical back: Normal range of motion and neck supple. No muscular tenderness.      Right lower leg: No edema.      Left lower leg: No edema.   Skin:     General: Skin is warm and dry.      Capillary Refill: Capillary refill takes less than 2 seconds.      Findings: No bruising, erythema or rash.   Neurological:      General: No focal deficit present.      Mental Status: He is alert and oriented to person, place, and time.         Fluids    Intake/Output Summary (Last 24 hours) at 11/28/2023 1742  Last data filed at 11/28/2023 0930  Gross per 24 hour   Intake 1060 ml   Output --   Net 1060 ml       Laboratory      Recent Labs     11/26/23  0128 11/27/23  0123   SODIUM 129* 132*   POTASSIUM 4.0 4.0   CHLORIDE 93* 96   CO2 26 26   GLUCOSE 118* 119*   BUN 39* 24*   CREATININE 5.39* 4.29*   CALCIUM 7.2* 7.8*                   Imaging  IR-VLADIMIR MOELLER PLACEMENT < 5   Final Result      1. ULTRASOUND AND FLUOROSCOPIC GUIDED PLACEMENT OF A Right INTERNAL JUGULAR 14.5 [employer] HemoSplit TUNNELED DIALYSIS CATHETER.      2. THE HEMODIALYSIS CATHETER MAY BE USED IMMEDIATELY AS CLINICALLY INDICATED. FLUSHES PER PROTOCOL.          DX-CHEST-PORTABLE (1 VIEW)   Final Result      1.  Marked enlargement of cardiac silhouette      2.  Bilateral atelectasis      3.  Probable small right pleural effusion      4.  Hyperinflation consistent with chronic obstructive pulmonary disease           Assessment/Plan  * Hyperkalemia- (present on admission)  Assessment & Plan  Secondary to progression of end-stage renal disease  Hyperkalemia resolved discussed with nephrology bicarb drip and Lokelma discontinued     discussed with the patient that he has progressed to ESRD and that he will likely need hemodialysis  Patient initially declining hemodialysis, he has changed his mind now and agrees to starting dialysis  No hospice at this time  Permacath ordered, will need outpatient dialysis chair set up as well prior to discharge    11/26/23  Resolved with dialysis.  Potassium of 4.0 today.    ESRD (end stage renal disease) on dialysis (HCC)- (present on admission)  Assessment & Plan  Right IJ TDC was placed on 11/24, continuing with MWF schedule.  Pending outpatient hemodialysis chair  QuantiFERON gold pending    Chronic systolic congestive heart failure (HCC)- (present on admission)  Assessment & Plan  Given hyperkalemia losartan and spironolactone were discontinued  Given bradycardia carvedilol was discontinued we will consider resuming beta-blocker if heart rate remains stable  Transition to PO lasix  Resume home coreg    Psychophysiologic insomnia- (present on admission)  Assessment & Plan  11/25/23  Declined medications.    Advance care planning  Assessment & Plan  At this time patient is interested in hospice referral which has been ordered    Noncompliance  Assessment & Plan  Patient has not followed up with cardiology or nephrology as outpatient and was stretching his outpatient medications  Counseled on compliance with medical therapy        Hyponatremia  Assessment & Plan  11/27/23  Sodium improved to 132.  Continue to monitor  closely.    Metabolic acidosis  Assessment & Plan  Secondary to renal failure      Anemia due to chronic kidney disease  Assessment & Plan  No clinical signs of bleeding  Overnight drop likely dilutional    Hypertension- (present on admission)  Assessment & Plan  Increase hydralazine and monitor blood pressure    Acute renal failure superimposed on chronic kidney disease (HCC)  Assessment & Plan  Right IJ TDC was placed on 11/24, continuing with MWF schedule.    Type 2 diabetes mellitus (HCC)- (present on admission)  Assessment & Plan  Continue insulin sliding scale and monitor CBGs   hemoglobin A1c 5.8    11/27/23  Blood glucose controlled.  Continue lispro insulin sign scale         VTE prophylaxis: Heparin prophylaxis    I have performed a physical exam and reviewed and updated ROS and Plan today (11/28/2023). In review of yesterday's note (11/27/2023), there are no changes except as documented above.

## 2023-11-29 NOTE — CARE PLAN
The patient is Stable - Low risk of patient condition declining or worsening    Shift Goals  Clinical Goals: safety  Patient Goals: rest  Family Goals: Not present    Progress made toward(s) clinical / shift goals:   in use, decreasing oxygen usage as tolerated, encouraged deep breathing and coughing, educated on use of IS.         Patient is not progressing towards the following goals:

## 2023-11-29 NOTE — ASSESSMENT & PLAN NOTE
Patient with productive cough, procalcitonin slightly elevated, patient with leukocytosis.    Chest x-ray with evidence of pulmonary edema.    Lasix increased to 80 mg twice daily.    COVID, influenza, RSV negative.  Will start patient on empiric Augmentin, renally dosed, due to concern for possible HAP.

## 2023-11-29 NOTE — PROGRESS NOTES
"[facility] -  PROGRESS NOTE               Author: Bonilla Alvarez M.D. Date & Time: 11/29/2023  8:44 AM     HPI:  Mr. 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 He reports having \"cold like symptoms\" with productive cough and SOB that he describes as inability to take a full breath worse on exertion and lying down flat. He denies chest pain, palpitations, headaches, profuse sweating, fevers or chills, etc.      He reported taking half the dose of his home meds for a few weeks prior to current hospitalization in order to make them last till his doctor appt. He restarted taking full doses of his meds including aldactone, losartan, amlodipine, atorva, and coreg on Saturday or Sunday which is when his sxs of SOB also seem to have started.     He was found to have hyperkalemia with K of 8, Scr of 8 similar to prior known Scr in 2021. Labs also showed HAGMA, with bicarb of 7-9 at admission. He refuses HD due to personal preferences. Hence hyperkalemia was managed medically and nephrology was consulted for management of hyperkalemia and CKD 5 which is progressing over the years.     DAILY NEPHROLOGY SUMMARY:  11/21: Initial consult done. K and metabolic acidosis improved with meds, sxs of SOB also improved with tx. Seen by palliative care for transition to hospice at discharge.   11/22: No overnight events. Weaning off O2. Hyperkalemia and HAGMA have resolved on labs this morning. Discontinued Lokelma (K <4) and bicarb (CO2 21).   11/23: pt had declined RRT and was leaning towards hospice but his family talked him into trying RRT, we discussed different options, he does live in " [place] so is interested in home therapy, poor appetite, some nausea this am, + SOB with exertion, no edema   11/24: Feeling well without CP SOB or worsening edema.  Getting permcath today.  VSS.  Labs unchanged  11/25: Seen   11/26: HD yesterday net UF 1L.   VSS 2L NC.  No CP SOB edema.  Seen on HD feeling well, feels \"bloated\" today.   11/27: Tolerated HD, stable hemodynamics, denies complaints  11/28: No acute events,  some SOB this AM, low BG level  11/29: No acute, events, seen on HD-tolerating procedure well    REVIEW OF SYSTEMS:    10 point ROS reviewed and is as per HPI/daily summary or otherwise negative    PMH/PSH/SH/FH:   Reviewed and unchanged since admission note    CURRENT MEDICATIONS:   Reviewed from admission to present day    VS:  VS:  BP (!) 141/67   Pulse 72   Temp 36.4 °C (97.6 °F) (Temporal)   Resp 16   Ht 1.778 m (5' 10\")   Wt 104 kg (228 lb 2.8 oz)   SpO2 90%   BMI 32.74 kg/m²   GENERAL: no acute distress  CV: No pedal edema  RESP: non-labored  GI: Soft  MSK: No joint deformities   SKIN: No concerning rashes  NEURO: AOx3  PSYCH: Cooperative    Fluids:  In: 1700 [P.O.:1700]  Out: -     LABS:  Recent Labs     11/27/23  0123 11/29/23  0507   SODIUM 132* 129*   POTASSIUM 4.0 4.0   CHLORIDE 96 93*   CO2 26 24   GLUCOSE 119* 147*   BUN 24* 24*   CREATININE 4.29* 4.55*   CALCIUM 7.8* 8.1*         IMAGING:  Reviewed results of the CXR. On my personal review, the cardiac silhouette appears slightly enlarged with blunting of b/l costophrenic angles, worse on the left side.      ASSESSMENTS  # CKD stage 5 - New ESRD  Renal biopsy in 08/2021 showed advanced diabetic glomerulosclerosis class IV  Originally declined RRT then wanted  interested in home therapy   Now dialysis dependent  # Hyperkalemia, Resolved  Likely secondary to renal insufficiency and home meds.   # Sinus bradycardia ,  Resolved  # Type 2 diabetes mellitus with diabetic nephropathy  # Hypertension, controlled   # Anemia of CKD, " sub-optimal goal  S/p IV iron  start SHIRA with iHD   # BMD-CKD    Ca+: 7.1, 6.9  Phos 3.9-> 3.8    Vit D 19      SUGGESTIONS:  HD today, MWF schedule for now  Interested in home therapy  Will work on as outpt for home eval   Refer for AVF/PD cath as outpt   Start shira with HD  On PO ergo  IV iron replacement  Renal diet  Will need outpt chair set up prior to discharge,   prefers [facility] they think, case management consulted     Thank you

## 2023-11-29 NOTE — DISCHARGE PLANNING
Case Management Discharge Planning    Admission Date: 11/20/2023  GMLOS: 3.6  ALOS: 8    6-Clicks ADL Score:    6-Clicks Mobility Score:        Anticipated Discharge Dispo: Discharge Disposition: Discharged to home/self care (01)  Discharge Address: [place] [address]  Discharge Contact Phone Number: [phone]    DME Needed: unknown    Action(s) Taken:   Currently receiving O2 2LNC.  Does not use oxygen at home.  Patient is not medically clear per Dr. Ocampo.  Anticipate patient will be medically clear on 12-1-23.  I informed dialysis coordinatorPeyton of patient's expected dc date and to please inform outpatient dialysis clinic of start date of 12-2-23 please.    Escalations Completed: Speciality Provider    Medically Clear: No    Next Steps:   Provide transitional care coordination as needed.    Barriers to Discharge: Medical clearance

## 2023-11-29 NOTE — DISCHARGE PLANNING
Outpatient Dialysis Note    Patient has been placed and confirmed at:    [facility]   [address] [place] [id number]    Ph: [phone]    Schedule: Tues, Thurs, Sat  Time: 8:30 AM    Patient to start Sat, 11/30 at 8:00 AM    CM at ext [phone] and nephrologist Dr Alvarez notified of placement confirmation.   Provided patient dialysis schedule welcome letter.     Xitlaly Reyes   Dialysis Coordinator / [facility]   Ph: [phone]

## 2023-11-30 ENCOUNTER — PHARMACY VISIT (OUTPATIENT)
Dept: PHARMACY | Facility: MEDICAL CENTER | Age: 63
End: 2023-11-30
Payer: COMMERCIAL

## 2023-11-30 ENCOUNTER — TELEPHONE (OUTPATIENT)
Dept: CARDIOLOGY | Facility: MEDICAL CENTER | Age: 63
End: 2023-11-30
Payer: MEDICAID

## 2023-11-30 VITALS
BODY MASS INDEX: 32.67 KG/M2 | SYSTOLIC BLOOD PRESSURE: 149 MMHG | HEART RATE: 65 BPM | HEIGHT: 70 IN | DIASTOLIC BLOOD PRESSURE: 78 MMHG | WEIGHT: 228.18 LBS | TEMPERATURE: 98.1 F | OXYGEN SATURATION: 95 % | RESPIRATION RATE: 16 BRPM

## 2023-11-30 LAB
ERYTHROCYTE [DISTWIDTH] IN BLOOD BY AUTOMATED COUNT: 44.5 FL (ref 35.9–50)
HCT VFR BLD AUTO: 25.1 % (ref 42–52)
HGB BLD-MCNC: 8.2 G/DL (ref 14–18)
MCH RBC QN AUTO: 29 PG (ref 27–33)
MCHC RBC AUTO-ENTMCNC: 32.7 G/DL (ref 32.3–36.5)
MCV RBC AUTO: 88.7 FL (ref 81.4–97.8)
PLATELET # BLD AUTO: 240 K/UL (ref 164–446)
PMV BLD AUTO: 10.7 FL (ref 9–12.9)
RBC # BLD AUTO: 2.83 M/UL (ref 4.7–6.1)
WBC # BLD AUTO: 11.5 K/UL (ref 4.8–10.8)

## 2023-11-30 PROCEDURE — A9270 NON-COVERED ITEM OR SERVICE: HCPCS | Performed by: STUDENT IN AN ORGANIZED HEALTH CARE EDUCATION/TRAINING PROGRAM

## 2023-11-30 PROCEDURE — 99239 HOSP IP/OBS DSCHRG MGMT >30: CPT | Performed by: GENERAL PRACTICE

## 2023-11-30 PROCEDURE — 700102 HCHG RX REV CODE 250 W/ 637 OVERRIDE(OP): Performed by: HOSPITALIST

## 2023-11-30 PROCEDURE — 85027 COMPLETE CBC AUTOMATED: CPT

## 2023-11-30 PROCEDURE — 700102 HCHG RX REV CODE 250 W/ 637 OVERRIDE(OP): Performed by: STUDENT IN AN ORGANIZED HEALTH CARE EDUCATION/TRAINING PROGRAM

## 2023-11-30 PROCEDURE — 700111 HCHG RX REV CODE 636 W/ 250 OVERRIDE (IP)

## 2023-11-30 PROCEDURE — 700102 HCHG RX REV CODE 250 W/ 637 OVERRIDE(OP): Performed by: INTERNAL MEDICINE

## 2023-11-30 PROCEDURE — A9270 NON-COVERED ITEM OR SERVICE: HCPCS | Performed by: HOSPITALIST

## 2023-11-30 PROCEDURE — A9270 NON-COVERED ITEM OR SERVICE: HCPCS | Performed by: INTERNAL MEDICINE

## 2023-11-30 RX ADMIN — HYDRALAZINE HYDROCHLORIDE 25 MG: 50 TABLET, FILM COATED ORAL at 06:01

## 2023-11-30 RX ADMIN — FUROSEMIDE 80 MG: 40 TABLET ORAL at 06:01

## 2023-11-30 RX ADMIN — CARVEDILOL 25 MG: 25 TABLET, FILM COATED ORAL at 08:55

## 2023-11-30 RX ADMIN — HEPARIN SODIUM 5000 UNITS: 5000 INJECTION, SOLUTION INTRAVENOUS; SUBCUTANEOUS at 06:00

## 2023-11-30 NOTE — PROGRESS NOTES
"[facility] -  PROGRESS NOTE               Author: Bonilla Alvarez M.D. Date & Time: 11/30/2023  8:58 AM     HPI:  Mr. 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 He reports having \"cold like symptoms\" with productive cough and SOB that he describes as inability to take a full breath worse on exertion and lying down flat. He denies chest pain, palpitations, headaches, profuse sweating, fevers or chills, etc.      He reported taking half the dose of his home meds for a few weeks prior to current hospitalization in order to make them last till his doctor appt. He restarted taking full doses of his meds including aldactone, losartan, amlodipine, atorva, and coreg on Saturday or Sunday which is when his sxs of SOB also seem to have started.     He was found to have hyperkalemia with K of 8, Scr of 8 similar to prior known Scr in 2021. Labs also showed HAGMA, with bicarb of 7-9 at admission. He refuses HD due to personal preferences. Hence hyperkalemia was managed medically and nephrology was consulted for management of hyperkalemia and CKD 5 which is progressing over the years.     DAILY NEPHROLOGY SUMMARY:  11/21: Initial consult done. K and metabolic acidosis improved with meds, sxs of SOB also improved with tx. Seen by palliative care for transition to hospice at discharge.   11/22: No overnight events. Weaning off O2. Hyperkalemia and HAGMA have resolved on labs this morning. Discontinued Lokelma (K <4) and bicarb (CO2 21).   11/23: pt had declined RRT and was leaning towards hospice but his family talked him into trying RRT, we discussed different options, he does live in " [place] so is interested in home therapy, poor appetite, some nausea this am, + SOB with exertion, no edema   11/24: Feeling well without CP SOB or worsening edema.  Getting permcath today.  VSS.  Labs unchanged  11/25: Seen   11/26: HD yesterday net UF 1L.   VSS 2L NC.  No CP SOB edema.  Seen on HD feeling well, feels \"bloated\" today.   11/27: Tolerated HD, stable hemodynamics, denies complaints  11/28: No acute events,  some SOB this AM, low BG level  11/29: No acute, events, seen on HD-tolerating procedure well  11/30: frustrated this AM, pending discharge, has outpatient HD unit starting on Saturday    REVIEW OF SYSTEMS:    10 point ROS reviewed and is as per HPI/daily summary or otherwise negative    PMH/PSH/SH/FH:   Reviewed and unchanged since admission note    CURRENT MEDICATIONS:   Reviewed from admission to present day    VS:  VS:  BP (!) 149/78   Pulse 65   Temp 36.7 °C (98.1 °F) (Temporal)   Resp 16   Ht 1.778 m (5' 10\")   Wt 104 kg (228 lb 2.8 oz)   SpO2 95%   BMI 32.74 kg/m²   GENERAL: no acute distress  CV: No pedal edema  RESP: non-labored  GI: Soft  MSK: No joint deformities   SKIN: No concerning rashes  NEURO: AOx3  PSYCH: Cooperative    Fluids:  In: 1110 [P.O.:610; Dialysis:500]  Out: 1500     LABS:  Recent Labs     11/29/23  0507   SODIUM 129*   POTASSIUM 4.0   CHLORIDE 93*   CO2 24   GLUCOSE 147*   BUN 24*   CREATININE 4.55*   CALCIUM 8.1*         IMAGING:  Reviewed results of the CXR. On my personal review, the cardiac silhouette appears slightly enlarged with blunting of b/l costophrenic angles, worse on the left side.      ASSESSMENTS  # CKD stage 5 - New ESRD  Renal biopsy in 08/2021 showed advanced diabetic glomerulosclerosis class IV  Originally declined RRT then wanted  interested in home therapy   Now dialysis dependent  # Hyperkalemia, Resolved  Likely secondary to renal insufficiency and home meds.   # Sinus bradycardia ,  Resolved  # Type 2 diabetes mellitus with diabetic " nephropathy  # Hypertension, controlled   # Anemia of CKD, sub-optimal goal  S/p IV iron  start RANDY with iHD   # BMD-CKD    Ca+: 7.1, 6.9  Phos 3.9-> 3.8    Vit D 19      SUGGESTIONS:  No HD today. Next session Saturday outpatient  Interested in home therapy  Will work on as outpt for home george   Has outpatient HD unit-[facility]  Refer for AVF/PD cath as outpt   randy with HD  On PO ergo  IV iron replacement  Renal diet    Thank you

## 2023-11-30 NOTE — PROGRESS NOTES
Patient complaining that he did not receive dinner. Was telling another RN and tech that he will be recording our conversations and will take this hospital to court, he already posted it all on social media.  Went in to talk to patient. He told me the same thing. I offered him snacks and to find him some food. Gave him snacks. The tech (Charlene) offered him a frozen dinner or a hamburger. He did not want the hamburger, but took the frozen dinner. I went in to check on him 20 min later. He said the frozen dinner was not good and he couldn't even eat it. he also said he never got offered the hamburger. Charlene said she did offer it to him. I know she offered it to him because I seen her go in there with both items.  Patient says she's a liar and calls her a b*tch. Says he will have her job and take this hospital to court.   I told patient just relax and offered if I can get him anything else and he said no.     Will continue to monitor

## 2023-11-30 NOTE — CARE PLAN
"Chief Complaint  Flank Pain (Pt is here today to follow up on kidney pain, pt presents she is sick with the flu. Pt has left side pain that comes and go.)    Subjective        Emily Rodríguez presents to [facility]    History of Present Illness  60 year old female presents for follow up for flank pain. Pt states she has been feeling sick with nasal congestion, cough, and fever that began yesterday. States people at her work have been sick and thinks it's the flu. COVID and Flu testing performed in office. COVID test positive, flu negative. Denies SOB or difficulty breathing. O2 level 98% in clinic. Informed patient that she needs to quarantine at home for 5 days from symptom onset, stay hydrated, and rest. Quarantine information attached to AVS. Mucinex-DM sent to pharmacy for symptoms. Informed pt she can take tylenol for fever. Informed patient that if she were to feel short of breath she needs to be seen in emergency room. Pt agreeable with plan. Pt states flank pain has improved and will follow up in 3 months for recheck.       Objective   Vital Signs:  /80   Pulse 102   Temp 99.7 °F (37.6 °C)   Ht 157.5 cm (62\")   Wt 84.4 kg (186 lb)   SpO2 98%   BMI 34.02 kg/m²   Estimated body mass index is 34.02 kg/m² as calculated from the following:    Height as of this encounter: 157.5 cm (62\").    Weight as of this encounter: 84.4 kg (186 lb).           Physical Exam  Constitutional:       Appearance: Normal appearance.   HENT:      Head: Normocephalic.   Eyes:      Conjunctiva/sclera: Conjunctivae normal.      Pupils: Pupils are equal, round, and reactive to light.   Cardiovascular:      Rate and Rhythm: Normal rate and regular rhythm.      Pulses: Normal pulses.   Pulmonary:      Effort: Pulmonary effort is normal.      Breath sounds: Normal breath sounds.   Skin:     General: Skin is warm.   Neurological:      General: No focal deficit present.      Mental Status: She is " The patient is Stable - Low risk of patient condition declining or worsening    Shift Goals  Clinical Goals: rest  Patient Goals: rest  Family Goals: Not present    Progress made toward(s) clinical / shift goals:  patient resting in bed comfortably. vss    Patient is not progressing towards the following goals:       alert and oriented to person, place, and time.   Psychiatric:         Mood and Affect: Mood normal.         Behavior: Behavior normal.              Result Review :  The following data was reviewed by: CIRA Lennon on 09/07/2023:  Common labs          11/23/2022    15:59 6/6/2023    10:52   Common Labs   Glucose  82    BUN  11    Creatinine  0.81    Sodium  142    Potassium  4.4    Chloride  104    Calcium  9.7    Total Protein  7.2    Albumin  4.2    Total Bilirubin  0.4    Alkaline Phosphatase  82    AST (SGOT)  14    ALT (SGPT)  13    WBC 10.08     7.89    Hemoglobin 12.5     13.5    Hematocrit 39.6     41.3    Platelets 265     265    Total Cholesterol  183    Triglycerides  67    HDL Cholesterol  65    LDL Cholesterol   105       Details          This result is from an external source.             Data reviewed : labs             Assessment and Plan   Diagnoses and all orders for this visit:    1. Coronavirus infection (Primary)  -     guaifenesin-dextromethorphan (MUCINEX DM)  MG tablet sustained-release 12 hour tablet; Take 2 tablets by mouth 2 (Two) Times a Day As Needed (as needed for congestion and cough).  Dispense: 20 tablet; Refill: 0    2. Fever, unspecified fever cause  -     POCT SARS-CoV-2 Antigen HERMILO  -     POCT Flu A&B, Molecular  -     POCT SARS-CoV-2 Antigen HERMILO    3. Nasal congestion  -     POCT SARS-CoV-2 Antigen HERMILO    4. Acute cough  -     guaifenesin-dextromethorphan (MUCINEX DM)  MG tablet sustained-release 12 hour tablet; Take 2 tablets by mouth 2 (Two) Times a Day As Needed (as needed for congestion and cough).  Dispense: 20 tablet; Refill: 0  -     POCT SARS-CoV-2 Antigen HERMILO    5. Flank pain  Comments:  Left, improved             Follow Up   Return in about 3 months (around 12/7/2023) for Recheck kidney labs .  Patient was given instructions and counseling regarding her condition or for health maintenance advice. Please see specific information pulled into the AVS if  appropriate.

## 2023-11-30 NOTE — DISCHARGE SUMMARY
Discharge Summary    CHIEF COMPLAINT ON ADMISSION  Chief Complaint   Patient presents with    Shortness of Breath     Pt reporting SOB since yesterday. 88% on RA. Pt arrived to ER on 10L NRB. Pt bradycardic in the 40s on arrival       Reason for Admission  Hyperkalemia     Admission Date  11/20/2023    CODE STATUS  DNAR/DNI    HPI & HOSPITAL COURSE  This is a 63-year-old male with past medical history of cardiomyopathy with LVEF of 25%, hypertension, dyslipidemia, type 2 diabetes A1C 5.8, CKD significant for diabetic glomerulosclerosis and noncompliance who was admitted on 11/20/2023 with acute hypoxemic respiratory failure, hyperkalemia and LEANNA on CKD.    On admission, patient was bradycardic requiring atropine, bicarb, bicarb drip, Lokelma, diuretics and ICU admission.  Initially patient was not amenable to hemodialysis, ultimately he decided to continue HD.  Right IJ TDC was placed on 11/24, continuing with TTS schedule outpatient.  QuantiFERON gold negative.  Home O2 evaluation was performed, patient does not require oxygen at rest or on exertion.    Therefore, he is discharged in good and stable condition to home with close outpatient follow-up.    The patient met 2-midnight criteria for an inpatient stay at the time of discharge.    Discharge Date  11/30/2023    FOLLOW UP ITEMS POST DISCHARGE  Primary care physician  Cardiology  Nephrology    DISCHARGE DIAGNOSES  Principal Problem:    Hyperkalemia (POA: Yes)  Active Problems:    Chronic systolic congestive heart failure (HCC) (POA: Yes)    ESRD (end stage renal disease) on dialysis (HCC) (POA: Yes)    Type 2 diabetes mellitus (HCC) (POA: Yes)    Acute renal failure superimposed on chronic kidney disease (HCC) (POA: Unknown)    Hypertension (POA: Yes)    Anemia due to chronic kidney disease (POA: Unknown)    Metabolic acidosis (POA: Unknown)    Hyponatremia (POA: Unknown)    Hypocalcemia (POA: Unknown)    Former smoker (POA: Unknown)    Noncompliance (Chronic)  (POA: Unknown)      Overview: States on 11/21/23 that he had stopped seeing cardiology for his CHF and       nephrology for his CKD because he did not feel that they were worth the       money. Does also have some economic barriers to keeping routine primary       care appointments.     Advance care planning (POA: Unknown)    Psychophysiologic insomnia (POA: Yes)    Superficial venous thrombosis of arm, left (POA: Unknown)    Acute hypoxemic respiratory failure (HCC) (POA: Unknown)  Resolved Problems:    Hyperphosphatemia associated with renal failure (POA: Unknown)      FOLLOW UP  Future Appointments   Date Time Provider Department Center   12/7/2023  8:15 AM Karlie Jenkins P.A.-C. [facility] None     The [address] [address]   [phone]  Go on 12/4/2023  Please arrive at 8:00 am on 12/04/2023 for ongoing heart failure treatment   The [facility], located at [address] [place] [address]   [phone]   Hours are Monday through Friday 8am-5pm. As we see patients on a first-come, first-serve basis, the wait varies depending on patient volume. We will do everything we can to get to you as soon as possible, but the wait may be upwards of three hours.    Anmol Mccray M.D.  [address] [place] [address]  [phone]    Follow up        MEDICATIONS ON DISCHARGE     Medication List        START taking these medications        Instructions   furosemide 80 MG Tabs  Commonly known as: Lasix   Take 1 Tablet by mouth 2 times a day for 30 days.  Dose: 80 mg     hydrALAZINE 25 MG Tabs  Commonly known as: Apresoline   Take 1 Tablet by mouth every 8 hours for 30 days.  Dose: 25 mg     vitamin D2 (Ergocalciferol) 1.25 MG (46141 UT) Caps capsule  Start taking on: December 1, 2023  Commonly known as: Drisdol   Take 1 Capsule by mouth every 7 days for 56 days.  Dose: 50,000  Units            CONTINUE taking these medications        Instructions   carvedilol 25 MG Tabs  Commonly known as: Coreg   Take 1 tablet by mouth 2 times a day with meals.  Dose: 25 mg     Lipitor 40 MG Tabs  Generic drug: atorvastatin   Take 40 mg by mouth every evening.  Dose: 40 mg            STOP taking these medications      amLODIPine 10 MG Tabs  Commonly known as: Norvasc     losartan 100 MG Tabs  Commonly known as: Cozaar     spironolactone 25 MG Tabs  Commonly known as: Aldactone              Allergies  No Known Allergies    DIET  Orders Placed This Encounter   Procedures    Diet Order Diet: Renal     Standing Status:   Standing     Number of Occurrences:   1     Order Specific Question:   Diet:     Answer:   Renal [8]       ACTIVITY  As tolerated.  Weight bearing as tolerated    CONSULTATIONS  Critical care  Nephrology  Palliative care    PROCEDURES  Right IJ TDC    LABORATORY  Lab Results   Component Value Date    SODIUM 129 (L) 11/29/2023    POTASSIUM 4.0 11/29/2023    CHLORIDE 93 (L) 11/29/2023    CO2 24 11/29/2023    GLUCOSE 147 (H) 11/29/2023    BUN 24 (H) 11/29/2023    CREATININE 4.55 (HH) 11/29/2023        Lab Results   Component Value Date    WBC 11.5 (H) 11/30/2023    HEMOGLOBIN 8.2 (L) 11/30/2023    HEMATOCRIT 25.1 (L) 11/30/2023    PLATELETCT 240 11/30/2023      DX-CHEST-PORTABLE (1 VIEW)   Final Result      1.  Increased pulmonary edema   2.  Small BILATERAL pleural effusions   3.  Persistently enlarged cardiac silhouette      US-EXTREMITY VENOUS UPPER UNILAT LEFT   Final Result      IR-MOELLER,GROSHONG PLACEMENT < 5   Final Result      1. ULTRASOUND AND FLUOROSCOPIC GUIDED PLACEMENT OF A Right INTERNAL JUGULAR 14.5 [employer] HemoSplit TUNNELED DIALYSIS CATHETER.      2. THE HEMODIALYSIS CATHETER MAY BE USED IMMEDIATELY AS CLINICALLY INDICATED. FLUSHES PER PROTOCOL.         DX-CHEST-PORTABLE (1 VIEW)   Final Result      1.  Marked enlargement of cardiac silhouette      2.  Bilateral atelectasis       3.  Probable small right pleural effusion      4.  Hyperinflation consistent with chronic obstructive pulmonary disease         Total time of the discharge process exceeds 45 minutes.

## 2023-11-30 NOTE — TELEPHONE ENCOUNTER
Pt last seen 06/2021 with RTC in 3mo. OV notes reviewed.    Phone Number Called: [phone]    Call outcome:  Spoke to Leydi and pt      Message: Please reach out to PCP for this request or return to [place] to  at pharmacy.

## 2023-11-30 NOTE — TELEPHONE ENCOUNTER
RT    Caller: Leydi - patients fried (listed on chart)    Medication Name and Dosage:     hydrALAZINE (APRESOLINE) 25 MG Tab  furosemide (Lasix) tablet 80 mg  vitamin D2, Ergocalciferol, (DRISDOL) 1.25 MG (32685 UT) Cap capsule  carvedilol (Coreg) tablet 25 mg  atorvastatin (Lipitor) tablet 40 mg       Medication amount left: none    Preferred Pharmacy: [facility] in [place]     Other questions (Topic): Leydi called because the patient was just released from the hospital, but they forgot to  his medications. They were hoping to have the medications sent to the [facility] in [place] to avoid driving back to [place].     Callback Number (Will only call for issues): [phone] - Leydi     Please advise.    Thank you,     Nikole SHELBY

## 2023-11-30 NOTE — PROGRESS NOTES
Patient is being discharged home from the discharge lounge. Patient is A&Ox4. IV removed. Discharge instructions provided to patient and reviewed. Patient verbalized understanding and states he has no further questions. Patient waiting in lobby for meds to beds.

## 2023-12-07 ENCOUNTER — APPOINTMENT (OUTPATIENT)
Dept: CARDIOLOGY | Facility: MEDICAL CENTER | Age: 63
End: 2023-12-07
Attending: PHYSICIAN ASSISTANT
Payer: MEDICAID

## 2024-01-05 ENCOUNTER — APPOINTMENT (OUTPATIENT)
Dept: CARDIOLOGY | Facility: MEDICAL CENTER | Age: 64
End: 2024-01-05
Attending: PHYSICIAN ASSISTANT
Payer: MEDICAID

## 2024-03-05 ENCOUNTER — OFFICE VISIT (OUTPATIENT)
Dept: VASCULAR SURGERY | Facility: MEDICAL CENTER | Age: 64
End: 2024-03-05
Payer: MEDICAID

## 2024-03-05 VITALS
TEMPERATURE: 97.9 F | SYSTOLIC BLOOD PRESSURE: 146 MMHG | HEIGHT: 70 IN | OXYGEN SATURATION: 98 % | BODY MASS INDEX: 32.64 KG/M2 | HEART RATE: 74 BPM | WEIGHT: 228 LBS | DIASTOLIC BLOOD PRESSURE: 82 MMHG

## 2024-03-05 DIAGNOSIS — I10 PRIMARY HYPERTENSION: ICD-10-CM

## 2024-03-05 DIAGNOSIS — E78.5 DYSLIPIDEMIA: ICD-10-CM

## 2024-03-05 DIAGNOSIS — N18.6 ESRD (END STAGE RENAL DISEASE) (HCC): ICD-10-CM

## 2024-03-05 PROCEDURE — 3077F SYST BP >= 140 MM HG: CPT | Performed by: SURGERY

## 2024-03-05 PROCEDURE — 3079F DIAST BP 80-89 MM HG: CPT | Performed by: SURGERY

## 2024-03-05 PROCEDURE — 99204 OFFICE O/P NEW MOD 45 MIN: CPT | Performed by: SURGERY

## 2024-03-05 RX ORDER — FUROSEMIDE 80 MG
80 TABLET ORAL 2 TIMES DAILY
COMMUNITY
Start: 2024-03-02

## 2024-03-05 ASSESSMENT — FIBROSIS 4 INDEX: FIB4 SCORE: .989949493661166534

## 2024-03-05 NOTE — PROGRESS NOTES
VASCULAR SURGERY SERVICE  CONSULT NOTE      Date: 3/5/2024    Referring Provider: [facility]    Consulting Physician: Luis Manuel Wilson MD - [facility]     -------------------------------------------------------------------------------------------------    Reason for consultation:  Dialysis access creation.    HPI:  This is a 63 y.o. right-handed male with multiple medical problems including end-stage renal disease who has been on hemodialysis since December of last year via a right IJ permacath.  Patient is on hemodialysis Tuesday, Thursday, and Saturday.  He is referred to see me for fistula creation.      Past Medical History:   Diagnosis Date    Acute renal failure superimposed on chronic kidney disease (HCC) 11/21/2023    Anemia     Anemia due to chronic kidney disease 11/21/2023    Anticoagulation monitoring, special range     Blood clotting disorder (HCC)     heart    Cardiomyopathy (HCC)     Chronic systolic congestive heart failure (HCC) 09/17/2020    Diabetes (HCC)     no longer on medications per MD, 6/2021    Former smoker 11/21/2023    High cholesterol     Hyperkalemia 11/21/2023    Hypertension     LV (left ventricular) mural thrombus     on warfarin, no longer on coumadin months ago    Metabolic acidosis 11/21/2023    Noncompliance 11/21/2023    States on 11/21/23 that he had stopped seeing cardiology for his CHF and nephrology for his CKD because he did not feel that they were worth the money. Does also have some economic barriers to keeping routine primary care appointments.        Past Surgical History:   Procedure Laterality Date    OTHER ORTHOPEDIC SURGERY      knee, 40 y ago       Current Outpatient Medications   Medication Sig Dispense Refill    furosemide (LASIX) 80 MG Tab Take 80 mg by mouth 2 times a day.      atorvastatin (LIPITOR) 40 MG Tab Take 40 mg by mouth every evening. (Patient not taking: Reported on 3/5/2024)      carvedilol (COREG) 25 MG Tab Take 1  tablet by mouth 2 times a day with meals. (Patient not taking: Reported on 3/5/2024) 90 tablet 3     No current facility-administered medications for this visit.       Social History     Socioeconomic History    Marital status: Single     Spouse name: Not on file    Number of children: Not on file    Years of education: Not on file    Highest education level: Not on file   Occupational History    Not on file   Tobacco Use    Smoking status: Former     Current packs/day: 0.00     Average packs/day: 1 pack/day for 20.0 years (20.0 ttl pk-yrs)     Types: Cigarettes     Start date:      Quit date:      Years since quittin.1    Smokeless tobacco: Never   Vaping Use    Vaping Use: Never used   Substance and Sexual Activity    Alcohol use: Yes     Comment: 4-6 drinks a week     Drug use: Not Currently     Types: Marijuana     Comment: smokes marijuana daily, last night at 1900    Sexual activity: Not on file   Other Topics Concern    Not on file   Social History Narrative    Not on file     Social Determinants of Health     Financial Resource Strain: Not on file   Food Insecurity: Not on file   Transportation Needs: Not on file   Physical Activity: Not on file   Stress: Not on file   Social Connections: Not on file   Intimate Partner Violence: Not on file   Housing Stability: Not on file       No family history on file.    Allergies:  Patient has no known allergies.    Review of Systems:    Constitutional: Negative for fever, chills, weight loss,   HENT:               Negative for hearing loss or tinnitus    Eyes:    Negative for blurred vision, double vision, or loss of vision  Respiratory:  Negative for cough, hemoptysis, or wheezing    Cardiac:  Negative for chest pain or palpitations or orthopnea  Vascular:  Negative for claudication or rest pain   Gastrointestinal: Negative for nausea, vomiting, or abdominal pain     Negative for hematochezia or melena   Genitourinary: Negative for dysuria, frequency, or  "hematuria   Musculoskeletal: Negative for myalgias, back pain, or joint pain  Skin:   Negative for itching or rash  Neurological:  Negative for dizziness, headaches, or tremors     Negative for speech disturbance     Negative for extremity weakness or paresthesias  Endo/Heme:  Positive for easy bruising or bleeding  Psychiatric:  Negative for depression, suicidal ideas, or hallucinations    Physical Exam:  BP (!) 146/82 (BP Location: Left arm, Patient Position: Sitting, BP Cuff Size: Adult)   Pulse 74   Temp 36.6 °C (97.9 °F) (Temporal)   Ht 1.778 m (5' 10\")   Wt 103 kg (228 lb)   SpO2 98%     Constitutional: Alert, oriented, no acute distress  HEENT:  Normocephalic and atraumatic, EOMI  Neck:   Supple, no JVD  Cardiovascular: Regular rate and rhythm  Pulmonary:  Good air entry bilaterally  Abdominal:  Soft, non-tender, non-distended     Aortic impulse not widened  Musculoskeletal: No edema, no tenderness  Neurological:  CN II-XII grossly intact, no focal deficits  Skin:   Skin is warm and dry. No rash noted.  Psychiatric:  Normal mood and affect.  Left upper extremity: Palpable brachial and radial pulses with multiphasic flow.  Neri's test is negative.  Left forearm cephalic vein appears to have good caliber for use in fistula creation.        Radiology:  Fistula creation duplex done at outside facility showed left cephalic vein to have good caliber for use in fistula creation.    Assessment:  -End-stage renal disease.  -Hypertension.  -Dyslipidemia.    Plan:  I had a long discussion with patient.  Study results were reviewed with him.  I discussed with him the option of undergoing a left radiocephalic fistula creation.  Risks and benefits were discussed.  Risks include, but not limited to, bleeding, infection, nerve injury, arterial steal syndrome which if severe enough would require ligation of the fistula, and perioperative anesthetic complications.  I also told him that it would take at least 2 months for " the fistula to mature following the creation and that there is a chance further intervention may be needed to get the fistula to mature.  Patient indicated understanding and would like to proceed with left radiocephalic fistula creation.  All questions were answered.  At his request, the procedure will be performed on April 3, 2024, pending operating room availability.    I asked patient not to have blood procedures/IV/blood draw done in the left arm as of today.  He indicated understanding.      Luis Manuel Wilson MD  [facility]   [facility] preferred or call my office [phone]  __________________________________________________________________  Patient:Donte Horta   MRN:[id number]   CSN:[id number]

## 2024-03-06 ENCOUNTER — TELEPHONE (OUTPATIENT)
Dept: VASCULAR SURGERY | Facility: MEDICAL CENTER | Age: 64
End: 2024-03-06
Payer: MEDICAID

## 2024-04-01 ENCOUNTER — TELEPHONE (OUTPATIENT)
Dept: VASCULAR SURGERY | Facility: MEDICAL CENTER | Age: 64
End: 2024-04-01
Payer: MEDICAID

## 2024-04-01 ENCOUNTER — APPOINTMENT (OUTPATIENT)
Dept: ADMISSIONS | Facility: MEDICAL CENTER | Age: 64
End: 2024-04-01
Attending: SURGERY
Payer: MEDICAID

## 2024-04-01 NOTE — TELEPHONE ENCOUNTER
Patient called and scheduled procedure with Dr. Wilson for 4/15 @ 7:30 am. Patient is to check in @ 5:30 am in the [facility] 2nd floor.     Patient has been instructed nothing to at or drink 8 hours prior and he will need a .

## 2024-04-03 ENCOUNTER — PRE-ADMISSION TESTING (OUTPATIENT)
Dept: ADMISSIONS | Facility: MEDICAL CENTER | Age: 64
End: 2024-04-03
Attending: SURGERY
Payer: MEDICAID

## 2024-04-03 RX ORDER — LOSARTAN POTASSIUM 100 MG/1
100 TABLET ORAL DAILY
COMMUNITY

## 2024-04-03 NOTE — OR NURSING
RN telephone preadmission appointment completed with Donte Rula. I told Donte to stop vitamins and supplements for 7 days prior to surgery. I told Donte that I will speak with Dr. Wilson's office regarding instructions for losartan and furosemide administration prior to surgery. Donte states his blood pressure has been low and he does not always take his losartan. I called and spoke with Roxi the surgery scheduler for Dr. Wilson and told her that Donte has hemodialysis on Saturday 4/13/24 and his surgery is on 4/15/24. I told Roxi that per anesthesia protocol he should hold losartan for 24 hours prior to surgery and to hold furosemide the day of surgery but that I would like her to confirm with Dr. Wilson. Roxi states that currently Dr. Wilson is in surgery but that she will ask him and then call Donte and give him instructions regarding administration of losartan and furosemide.

## 2024-04-04 ENCOUNTER — TELEPHONE (OUTPATIENT)
Dept: VASCULAR SURGERY | Facility: MEDICAL CENTER | Age: 64
End: 2024-04-04
Payer: MEDICAID

## 2024-04-04 NOTE — TELEPHONE ENCOUNTER
"I called patient and gave instructions regarding Losartan and Lasix.     Per Dr. Wilson, patient can hold Losartan the morning of surgery and he can continue taking Lasix.     Patient stated that he hasn't been taking his Losartan because he feels like his blood pressure is \"fine\". I also notified Dr. Wilson about this as well.     I asked if patient had any further questions, he stated \"No, not at this time\".   "

## 2024-04-12 ENCOUNTER — TELEPHONE (OUTPATIENT)
Dept: VASCULAR SURGERY | Facility: MEDICAL CENTER | Age: 64
End: 2024-04-12
Payer: MEDICAID

## 2024-04-12 NOTE — TELEPHONE ENCOUNTER
I called patient to confirm upcoming procedure with Dr. Wilson for 4/15 @ 7:30 am. Patient is to check in @ 5:30 am in the [facility] 2nd floor.

## 2024-04-15 ENCOUNTER — HOSPITAL ENCOUNTER (OUTPATIENT)
Facility: MEDICAL CENTER | Age: 64
End: 2024-04-15
Attending: SURGERY | Admitting: SURGERY
Payer: MEDICAID

## 2024-04-15 ENCOUNTER — ANESTHESIA (OUTPATIENT)
Dept: SURGERY | Facility: MEDICAL CENTER | Age: 64
End: 2024-04-15
Payer: MEDICAID

## 2024-04-15 ENCOUNTER — ANESTHESIA EVENT (OUTPATIENT)
Dept: SURGERY | Facility: MEDICAL CENTER | Age: 64
End: 2024-04-15
Payer: MEDICAID

## 2024-04-15 VITALS
RESPIRATION RATE: 18 BRPM | BODY MASS INDEX: 29.32 KG/M2 | SYSTOLIC BLOOD PRESSURE: 163 MMHG | HEIGHT: 70 IN | HEART RATE: 54 BPM | WEIGHT: 204.81 LBS | OXYGEN SATURATION: 98 % | DIASTOLIC BLOOD PRESSURE: 78 MMHG | TEMPERATURE: 98.2 F

## 2024-04-15 LAB
ANION GAP SERPL CALC-SCNC: 15 MMOL/L (ref 7–16)
BUN SERPL-MCNC: 50 MG/DL (ref 8–22)
CALCIUM SERPL-MCNC: 9.5 MG/DL (ref 8.5–10.5)
CHLORIDE SERPL-SCNC: 96 MMOL/L (ref 96–112)
CO2 SERPL-SCNC: 22 MMOL/L (ref 20–33)
CREAT SERPL-MCNC: 6.53 MG/DL (ref 0.5–1.4)
EKG IMPRESSION: NORMAL
ERYTHROCYTE [DISTWIDTH] IN BLOOD BY AUTOMATED COUNT: 42.8 FL (ref 35.9–50)
GFR SERPLBLD CREATININE-BSD FMLA CKD-EPI: 9 ML/MIN/1.73 M 2
GLUCOSE BLD STRIP.AUTO-MCNC: 149 MG/DL (ref 65–99)
GLUCOSE SERPL-MCNC: 156 MG/DL (ref 65–99)
HCT VFR BLD AUTO: 36 % (ref 42–52)
HGB BLD-MCNC: 12.6 G/DL (ref 14–18)
MCH RBC QN AUTO: 29.7 PG (ref 27–33)
MCHC RBC AUTO-ENTMCNC: 35 G/DL (ref 32.3–36.5)
MCV RBC AUTO: 84.9 FL (ref 81.4–97.8)
PLATELET # BLD AUTO: 290 K/UL (ref 164–446)
PMV BLD AUTO: 9.8 FL (ref 9–12.9)
POTASSIUM SERPL-SCNC: 3.8 MMOL/L (ref 3.6–5.5)
RBC # BLD AUTO: 4.24 M/UL (ref 4.7–6.1)
SODIUM SERPL-SCNC: 133 MMOL/L (ref 135–145)
WBC # BLD AUTO: 8.9 K/UL (ref 4.8–10.8)

## 2024-04-15 PROCEDURE — 93005 ELECTROCARDIOGRAM TRACING: CPT | Performed by: SURGERY

## 2024-04-15 PROCEDURE — 160002 HCHG RECOVERY MINUTES (STAT): Performed by: SURGERY

## 2024-04-15 PROCEDURE — 700101 HCHG RX REV CODE 250: Mod: UD | Performed by: ANESTHESIOLOGY

## 2024-04-15 PROCEDURE — 700105 HCHG RX REV CODE 258: Mod: UD | Performed by: ANESTHESIOLOGY

## 2024-04-15 PROCEDURE — 160048 HCHG OR STATISTICAL LEVEL 1-5: Performed by: SURGERY

## 2024-04-15 PROCEDURE — 700111 HCHG RX REV CODE 636 W/ 250 OVERRIDE (IP): Mod: UD | Performed by: ANESTHESIOLOGY

## 2024-04-15 PROCEDURE — 160046 HCHG PACU - 1ST 60 MINS PHASE II: Performed by: SURGERY

## 2024-04-15 PROCEDURE — 85027 COMPLETE CBC AUTOMATED: CPT

## 2024-04-15 PROCEDURE — 160035 HCHG PACU - 1ST 60 MINS PHASE I: Performed by: SURGERY

## 2024-04-15 PROCEDURE — 36821 AV FUSION DIRECT ANY SITE: CPT | Mod: LT | Performed by: SURGERY

## 2024-04-15 PROCEDURE — 93010 ELECTROCARDIOGRAM REPORT: CPT | Performed by: INTERNAL MEDICINE

## 2024-04-15 PROCEDURE — C1713 ANCHOR/SCREW BN/BN,TIS/BN: HCPCS | Performed by: SURGERY

## 2024-04-15 PROCEDURE — 36415 COLL VENOUS BLD VENIPUNCTURE: CPT

## 2024-04-15 PROCEDURE — 160039 HCHG SURGERY MINUTES - EA ADDL 1 MIN LEVEL 3: Performed by: SURGERY

## 2024-04-15 PROCEDURE — 160028 HCHG SURGERY MINUTES - 1ST 30 MINS LEVEL 3: Performed by: SURGERY

## 2024-04-15 PROCEDURE — 82962 GLUCOSE BLOOD TEST: CPT

## 2024-04-15 PROCEDURE — 700111 HCHG RX REV CODE 636 W/ 250 OVERRIDE (IP): Mod: UD

## 2024-04-15 PROCEDURE — 700111 HCHG RX REV CODE 636 W/ 250 OVERRIDE (IP): Mod: JZ,UD | Performed by: SURGERY

## 2024-04-15 PROCEDURE — A9270 NON-COVERED ITEM OR SERVICE: HCPCS | Mod: UD | Performed by: ANESTHESIOLOGY

## 2024-04-15 PROCEDURE — 80048 BASIC METABOLIC PNL TOTAL CA: CPT

## 2024-04-15 PROCEDURE — 160009 HCHG ANES TIME/MIN: Performed by: SURGERY

## 2024-04-15 PROCEDURE — 160025 RECOVERY II MINUTES (STATS): Performed by: SURGERY

## 2024-04-15 PROCEDURE — 700102 HCHG RX REV CODE 250 W/ 637 OVERRIDE(OP): Mod: UD | Performed by: ANESTHESIOLOGY

## 2024-04-15 RX ORDER — LABETALOL HYDROCHLORIDE 5 MG/ML
5 INJECTION, SOLUTION INTRAVENOUS
Status: DISCONTINUED | OUTPATIENT
Start: 2024-04-15 | End: 2024-04-15 | Stop reason: HOSPADM

## 2024-04-15 RX ORDER — BUPIVACAINE HYDROCHLORIDE 2.5 MG/ML
INJECTION, SOLUTION INFILTRATION; PERINEURAL
Status: DISCONTINUED | OUTPATIENT
Start: 2024-04-15 | End: 2024-04-15 | Stop reason: HOSPADM

## 2024-04-15 RX ORDER — LIDOCAINE HYDROCHLORIDE 10 MG/ML
INJECTION, SOLUTION EPIDURAL; INFILTRATION; INTRACAUDAL; PERINEURAL
Status: DISCONTINUED
Start: 2024-04-15 | End: 2024-04-15 | Stop reason: HOSPADM

## 2024-04-15 RX ORDER — HALOPERIDOL 5 MG/ML
1 INJECTION INTRAMUSCULAR
Status: DISCONTINUED | OUTPATIENT
Start: 2024-04-15 | End: 2024-04-15 | Stop reason: HOSPADM

## 2024-04-15 RX ORDER — CEFAZOLIN SODIUM 1 G/3ML
INJECTION, POWDER, FOR SOLUTION INTRAMUSCULAR; INTRAVENOUS PRN
Status: DISCONTINUED | OUTPATIENT
Start: 2024-04-15 | End: 2024-04-15 | Stop reason: SURG

## 2024-04-15 RX ORDER — HEPARIN SODIUM 5000 [USP'U]/ML
INJECTION, SOLUTION INTRAVENOUS; SUBCUTANEOUS
Status: DISCONTINUED | OUTPATIENT
Start: 2024-04-15 | End: 2024-04-15 | Stop reason: HOSPADM

## 2024-04-15 RX ORDER — HEPARIN SODIUM,PORCINE 1000/ML
VIAL (ML) INJECTION PRN
Status: DISCONTINUED | OUTPATIENT
Start: 2024-04-15 | End: 2024-04-15 | Stop reason: SURG

## 2024-04-15 RX ORDER — HEPARIN SODIUM 5000 [USP'U]/ML
INJECTION, SOLUTION INTRAVENOUS; SUBCUTANEOUS
Status: DISCONTINUED
Start: 2024-04-15 | End: 2024-04-15 | Stop reason: HOSPADM

## 2024-04-15 RX ORDER — SODIUM CHLORIDE 9 MG/ML
INJECTION, SOLUTION INTRAVENOUS
Status: DISCONTINUED | OUTPATIENT
Start: 2024-04-15 | End: 2024-04-15 | Stop reason: SURG

## 2024-04-15 RX ORDER — HYDRALAZINE HYDROCHLORIDE 20 MG/ML
5 INJECTION INTRAMUSCULAR; INTRAVENOUS
Status: DISCONTINUED | OUTPATIENT
Start: 2024-04-15 | End: 2024-04-15 | Stop reason: HOSPADM

## 2024-04-15 RX ORDER — ONDANSETRON 2 MG/ML
4 INJECTION INTRAMUSCULAR; INTRAVENOUS
Status: DISCONTINUED | OUTPATIENT
Start: 2024-04-15 | End: 2024-04-15 | Stop reason: HOSPADM

## 2024-04-15 RX ORDER — ONDANSETRON 2 MG/ML
INJECTION INTRAMUSCULAR; INTRAVENOUS PRN
Status: DISCONTINUED | OUTPATIENT
Start: 2024-04-15 | End: 2024-04-15 | Stop reason: SURG

## 2024-04-15 RX ORDER — EPHEDRINE SULFATE 50 MG/ML
INJECTION, SOLUTION INTRAVENOUS PRN
Status: DISCONTINUED | OUTPATIENT
Start: 2024-04-15 | End: 2024-04-15 | Stop reason: SURG

## 2024-04-15 RX ORDER — OXYCODONE HCL 5 MG/5 ML
10 SOLUTION, ORAL ORAL
Status: COMPLETED | OUTPATIENT
Start: 2024-04-15 | End: 2024-04-15

## 2024-04-15 RX ORDER — LIDOCAINE HYDROCHLORIDE 10 MG/ML
INJECTION, SOLUTION EPIDURAL; INFILTRATION; INTRACAUDAL; PERINEURAL
Status: DISCONTINUED | OUTPATIENT
Start: 2024-04-15 | End: 2024-04-15 | Stop reason: HOSPADM

## 2024-04-15 RX ORDER — BUPIVACAINE HYDROCHLORIDE 2.5 MG/ML
INJECTION, SOLUTION EPIDURAL; INFILTRATION; INTRACAUDAL
Status: DISCONTINUED
Start: 2024-04-15 | End: 2024-04-15 | Stop reason: HOSPADM

## 2024-04-15 RX ORDER — LIDOCAINE HYDROCHLORIDE 20 MG/ML
INJECTION, SOLUTION EPIDURAL; INFILTRATION; INTRACAUDAL; PERINEURAL PRN
Status: DISCONTINUED | OUTPATIENT
Start: 2024-04-15 | End: 2024-04-15 | Stop reason: SURG

## 2024-04-15 RX ORDER — PROTAMINE SULFATE 10 MG/ML
INJECTION, SOLUTION INTRAVENOUS
Status: DISCONTINUED
Start: 2024-04-15 | End: 2024-04-15 | Stop reason: HOSPADM

## 2024-04-15 RX ORDER — OXYCODONE HCL 5 MG/5 ML
5 SOLUTION, ORAL ORAL
Status: COMPLETED | OUTPATIENT
Start: 2024-04-15 | End: 2024-04-15

## 2024-04-15 RX ORDER — HEPARIN SODIUM 1000 [USP'U]/ML
INJECTION, SOLUTION INTRAVENOUS; SUBCUTANEOUS
Status: COMPLETED
Start: 2024-04-15 | End: 2024-04-15

## 2024-04-15 RX ORDER — DEXAMETHASONE SODIUM PHOSPHATE 4 MG/ML
INJECTION, SOLUTION INTRA-ARTICULAR; INTRALESIONAL; INTRAMUSCULAR; INTRAVENOUS; SOFT TISSUE PRN
Status: DISCONTINUED | OUTPATIENT
Start: 2024-04-15 | End: 2024-04-15 | Stop reason: SURG

## 2024-04-15 RX ADMIN — CEFAZOLIN 2 G: 1 INJECTION, POWDER, FOR SOLUTION INTRAMUSCULAR; INTRAVENOUS at 07:52

## 2024-04-15 RX ADMIN — SODIUM CHLORIDE: 9 INJECTION, SOLUTION INTRAVENOUS at 07:47

## 2024-04-15 RX ADMIN — FENTANYL CITRATE 50 MCG: 50 INJECTION, SOLUTION INTRAMUSCULAR; INTRAVENOUS at 07:52

## 2024-04-15 RX ADMIN — PROPOFOL 160 MG: 10 INJECTION, EMULSION INTRAVENOUS at 07:52

## 2024-04-15 RX ADMIN — HEPARIN SODIUM 3000 UNITS: 1000 INJECTION, SOLUTION INTRAVENOUS; SUBCUTANEOUS at 08:05

## 2024-04-15 RX ADMIN — DEXAMETHASONE SODIUM PHOSPHATE 8 MG: 4 INJECTION INTRA-ARTICULAR; INTRALESIONAL; INTRAMUSCULAR; INTRAVENOUS; SOFT TISSUE at 07:56

## 2024-04-15 RX ADMIN — HYDRALAZINE HYDROCHLORIDE 5 MG: 20 INJECTION, SOLUTION INTRAMUSCULAR; INTRAVENOUS at 09:05

## 2024-04-15 RX ADMIN — HYDRALAZINE HYDROCHLORIDE 5 MG: 20 INJECTION, SOLUTION INTRAMUSCULAR; INTRAVENOUS at 09:10

## 2024-04-15 RX ADMIN — EPHEDRINE SULFATE 10 MG: 50 INJECTION, SOLUTION INTRAVENOUS at 08:12

## 2024-04-15 RX ADMIN — OXYCODONE HYDROCHLORIDE 10 MG: 5 SOLUTION ORAL at 08:58

## 2024-04-15 RX ADMIN — LIDOCAINE HYDROCHLORIDE 60 MG: 20 INJECTION, SOLUTION EPIDURAL; INFILTRATION; INTRACAUDAL at 07:52

## 2024-04-15 RX ADMIN — ONDANSETRON 4 MG: 2 INJECTION INTRAMUSCULAR; INTRAVENOUS at 08:27

## 2024-04-15 ASSESSMENT — PAIN DESCRIPTION - PAIN TYPE
TYPE: SURGICAL PAIN

## 2024-04-15 ASSESSMENT — PAIN SCALES - GENERAL: PAIN_LEVEL: 4

## 2024-04-15 ASSESSMENT — FIBROSIS 4 INDEX: FIB4 SCORE: .989949493661166534

## 2024-04-15 NOTE — ANESTHESIA POSTPROCEDURE EVALUATION
Patient: Donte Horta    Procedure Summary       Date: 04/15/24 Room / Location: [facility] ROOM 23 / SURGERY SAME DAY [facility]    Anesthesia Start: 0747 Anesthesia Stop: 0838    Procedure: CREATION OF LEFT ARM DIALYSIS FISTULA (Left: Arm Lower) Diagnosis: (END STAGE RENAL DISEASE)    Surgeons: Luis Manuel Wilson M.D. Responsible Provider: Julius Mills M.D.    Anesthesia Type: general ASA Status: 3            Final Anesthesia Type: general  Last vitals  BP   Blood Pressure: (!) 163/78    Temp   36.8 °C (98.2 °F)    Pulse   (!) 54   Resp   18    SpO2   98 %      Anesthesia Post Evaluation    Patient location during evaluation: PACU  Patient participation: complete - patient participated  Level of consciousness: awake and alert  Pain score: 4    Airway patency: patent  Anesthetic complications: no  Cardiovascular status: hemodynamically stable  Respiratory status: acceptable  Hydration status: euvolemic    PONV: none          There were no known notable events for this encounter.     Nurse Pain Score: 4 (NPRS)

## 2024-04-15 NOTE — ANESTHESIA PREPROCEDURE EVALUATION
Case: [id number] Date/Time: 04/15/24 0715    Procedure: CREATION OF LEFT ARM DIALYSIS FISTULA    Pre-op diagnosis: END STAGE RENAL DISEASE    Location: [facility] ROOM 23 / SURGERY SAME DAY [facility]    Surgeons: Luis Manuel Wilson M.D.            Relevant Problems   CARDIAC   (positive) Chronic systolic congestive heart failure (HCC)   (positive) Essential hypertension, benign   (positive) Hypertension   (positive) Resistant hypertension   (positive) Superficial venous thrombosis of arm, left         (positive) Acute renal failure superimposed on chronic kidney disease (HCC)   (positive) ESRD (end stage renal disease) on dialysis (HCC)   (positive) Stage 4 chronic kidney disease (HCC)      ENDO   (positive) Type 2 diabetes mellitus (HCC)       Physical Exam    Airway   Mallampati: III  TM distance: >3 FB  Neck ROM: full       Cardiovascular - normal exam  Rhythm: regular  Rate: normal  (-) murmur     Dental - normal exam           Pulmonary - normal exam  Breath sounds clear to auscultation     Abdominal    Neurological - normal exam                   Anesthesia Plan    ASA 3   ASA physical status 3 criteria: ESRD undergoing regularly scheduled dialysis and moderate reduction of ejection fraction    Plan - general       Airway plan will be LMA          Induction: intravenous    Postoperative Plan: Postoperative administration of opioids is intended.    Pertinent diagnostic labs and testing reviewed    Informed Consent:    Anesthetic plan and risks discussed with patient.

## 2024-04-15 NOTE — ANESTHESIA PROCEDURE NOTES
Airway    Date/Time: 4/15/2024 7:53 AM    Performed by: Julius Mills M.D.  Authorized by: Julius Mills M.D.    Location:  OR  Urgency:  Elective  Indications for Airway Management:  Anesthesia      Spontaneous Ventilation: absent    Sedation Level:  Deep  Preoxygenated: Yes    Mask Difficulty Assessment:  1 - vent by mask  Final Airway Type:  Supraglottic airway  Final Supraglottic Airway:  Standard LMA    SGA Size:  4  Number of Attempts at Approach:  1

## 2024-04-15 NOTE — DISCHARGE INSTRUCTIONS
Activity: Ambulate minimum 3x/day   Restricted weight-bearing extremity: No weight-bearing restrictions. Do not use left arm for IVs/ lab draws or BP cuffs.  Last pain medication (oxycodone) given at 9 am. You may take tylenol at any time.  For 24 hours keep arm elevated and warm. DO NOT USE ICE on arm    If any questions arise, call your provider.  If your provider is not available, please feel free to call the Surgical Center at [phone].    MEDICATIONS: Resume taking daily medication.  Take prescribed pain medication with food.  If no medication is prescribed, you may take non-aspirin pain medication if needed.  PAIN MEDICATION CAN BE VERY CONSTIPATING.  Take a stool softener or laxative such as senokot, pericolace, or milk of magnesia if needed.    What to Expect Post Anesthesia    Rest and take it easy for the first 24 hours.  A responsible adult is recommended to remain with you during that time.  It is normal to feel sleepy.  We encourage you to not do anything that requires balance, judgment or coordination.    FOR 24 HOURS DO NOT:  Drive, operate machinery or run household appliances.  Drink beer or alcoholic beverages.  Make important decisions or sign legal documents.    To avoid nausea, slowly advance diet as tolerated, avoiding spicy or greasy foods for the first day.  Add more substantial food to your diet according to your provider's instructions.  Babies can be fed formula or breast milk as soon as they are hungry.  INCREASE FLUIDS AND FIBER TO AVOID CONSTIPATION.    MILD FLU-LIKE SYMPTOMS ARE NORMAL.  YOU MAY EXPERIENCE GENERALIZED MUSCLE ACHES, THROAT IRRITATION, HEADACHE AND/OR SOME NAUSEA.    Last pain medication (oxycodone) given at 9 am. You may take tylenol at any time.

## 2024-04-15 NOTE — OR NURSING
0836 Pt to PACU from OR. Report from anesthesia and OR RN. On 5L O2 via mask. Respirations even and unlabored. VSS. Dressing is CDI to surgical site on left lower arm.    0846 Patient's family member updated via phone call.    0855 Patient tolerating sips of water.    0906 Patient meets phase 2 criteria.    0928 Discharge instructions discussed with Leydi. All questions answered. Verbalized understanding.    0931 Patient up and getting dressed.    0939 PIV removed with tip intact.    0941 Pt escorted out of department in wheelchair with all belongings. Discharged home to responsible adult.

## 2024-04-15 NOTE — OR SURGEON
Immediate Post OP Note    PreOp Diagnosis: End-stage renal disease.      PostOp Diagnosis: Same.      Procedure(s):  Left distal radiocephalic arteriovenous fistula creation - Wound Class: Clean    Surgeon(s):  Luis Manuel Wilson M.D.    Anesthesiologist/Type of Anesthesia:  Anesthesiologist: Julius Mills M.D./General    Surgical Staff:  Circulator: Pat Berry R.N.  Scrub Person: Marifer Ramos; Luh Olivier    Specimens removed if any:  * No specimens in log *    Estimated Blood Loss: 5 mL.    IV fluids: 200 mL.    Findings: Good flow post creation.    Complications: None.    Dictated, #[id number].        4/15/2024 8:47 AM Luis Manuel Wilson M.D.

## 2024-04-15 NOTE — OP REPORT
DATE OF SERVICE:  04/15/2024     SURGEON:  Luis Manuel Wilson MD     ANESTHESIOLOGIST:  Julius Mills MD     TYPE OF ANESTHESIA:  General anesthesia and local anesthesia with 10 mL of   0.25% Marcaine solution.     PREOPERATIVE DIAGNOSIS:  End-stage renal disease.     POSTOPERATIVE DIAGNOSIS:  End-stage renal disease.     PROCEDURE:  Left distal radiocephalic arteriovenous fistula creation.     INDICATIONS FOR PROCEDURE:  This is a pleasant 63-year-old male with multiple   medical problems including end-stage renal disease, on hemodialysis via a   right IJ Perm-A-Cath.  The patient was referred to see me for fistula   creation.  Discussion was made with him.  He would like to proceed with left   radiocephalic fistula creation, fully understanding all risks.     DESCRIPTION OF PROCEDURE:  Informed consent was obtained.  The patient was   taken to the operating room and was placed in the supine position.  Sequential   compression device was applied.  The patient was given Ancef intravenously.    General anesthesia was induced.     Next, his left upper extremity was sterilely prepped and draped in the normal   fashion.  A timeout procedure was done.  The skin and subcutaneous tissues   over and surrounding the radial artery and cephalic vein in the distal forearm   was anesthetized with 10 mL of 0.25% Marcaine solution.  A small incision was   made between the two structures.  The incision was extended through   subcutaneous tissue using the electrocautery.  The cephalic vein was   identified, carefully dissected.  It was found to be patent about 4 cm above   the wrist, but chronically occluded distally.  This radial artery was   identified and carefully dissected.     The patient was given heparin 3000 units intravenously.  After the heparin was   allowed to circulate systemically for 3 minutes, the cephalic vein was   clipped distally above the occluded area with Hemoclips and divided.  Vascular   control of the  radial artery was obtained with nontraumatic bulldog clamps.    An arteriotomy was made on the radial artery.  The cephalic vein was opened   posteriorly and anastomosed end-to-side to the radial artery using running 7-0   Prolene suture.  Prior to completing the anastomosis, backbleeding and   flushing was obtained.  The anastomosis was completed.  Flow was restored into   the fistula and then into the distal radial artery.  A sterile Doppler probe   was brought into the operative field.  Good Doppler flow signal was obtained   over the fistula as well as over the radial artery, both above and below the   anastomosis.     The wounds were irrigated, was found to have excellent hemostasis.  The wounds   were closed subcutaneously with running 3-0 Vicryl suture and subcuticularly   with 4-0 Monocryl suture.  The wounds were cleaned and sterile dressing was   applied.     ESTIMATED BLOOD LOSS:  5 mL     INTRAVENOUS FLUIDS:  200 mL     COUNTS:  Sponge and instrument count was correct x2.     The patient was then awakened, extubated, taken to recovery area in stable   condition with good bruits over the fistula and intact neurovascular   examination of the left hand.        ______________________________  Luis Manuel Wilson MD    TQN/KRI    DD:  04/15/2024 08:52  DT:  04/15/2024 09:44    Job#:  [id number]

## 2024-04-15 NOTE — H&P
Referring Provider: [facility].     Consulting Physician: Luis Manuel Wilson MD - [facility]      -------------------------------------------------------------------------------------------------     Reason for consultation:  Dialysis access creation.     HPI:  This is a 63 y.o. right-handed male with multiple medical problems including end-stage renal disease who has been on hemodialysis since December of last year via a right IJ permacath.  Patient is on hemodialysis Tuesday, Thursday, and Saturday.  He is referred to see me for fistula creation.        Past Medical History        Past Medical History:   Diagnosis Date    Acute renal failure superimposed on chronic kidney disease (HCC) 11/21/2023    Anemia      Anemia due to chronic kidney disease 11/21/2023    Anticoagulation monitoring, special range      Blood clotting disorder (HCC)       heart    Cardiomyopathy (HCC)      Chronic systolic congestive heart failure (HCC) 09/17/2020    Diabetes (HCC)       no longer on medications per MD, 6/2021    Former smoker 11/21/2023    High cholesterol      Hyperkalemia 11/21/2023    Hypertension      LV (left ventricular) mural thrombus       on warfarin, no longer on coumadin months ago    Metabolic acidosis 11/21/2023    Noncompliance 11/21/2023     States on 11/21/23 that he had stopped seeing cardiology for his CHF and nephrology for his CKD because he did not feel that they were worth the money. Does also have some economic barriers to keeping routine primary care appointments.             Past Surgical History         Past Surgical History:   Procedure Laterality Date    OTHER ORTHOPEDIC SURGERY         knee, 40 y ago            Current Medications          Current Outpatient Medications   Medication Sig Dispense Refill    furosemide (LASIX) 80 MG Tab Take 80 mg by mouth 2 times a day.        atorvastatin (LIPITOR) 40 MG Tab Take 40 mg by mouth every evening. (Patient not taking: Reported  on 3/5/2024)        carvedilol (COREG) 25 MG Tab Take 1 tablet by mouth 2 times a day with meals. (Patient not taking: Reported on 3/5/2024) 90 tablet 3      No current facility-administered medications for this visit.            Social History               Socioeconomic History    Marital status: Single       Spouse name: Not on file    Number of children: Not on file    Years of education: Not on file    Highest education level: Not on file   Occupational History    Not on file   Tobacco Use    Smoking status: Former       Current packs/day: 0.00       Average packs/day: 1 pack/day for 20.0 years (20.0 ttl pk-yrs)       Types: Cigarettes       Start date:        Quit date:        Years since quittin.1    Smokeless tobacco: Never   Vaping Use    Vaping Use: Never used   Substance and Sexual Activity    Alcohol use: Yes       Comment: 4-6 drinks a week     Drug use: Not Currently       Types: Marijuana       Comment: smokes marijuana daily, last night at 1900    Sexual activity: Not on file   Other Topics Concern    Not on file   Social History Narrative    Not on file      Social Determinants of Health      Financial Resource Strain: Not on file   Food Insecurity: Not on file   Transportation Needs: Not on file   Physical Activity: Not on file   Stress: Not on file   Social Connections: Not on file   Intimate Partner Violence: Not on file   Housing Stability: Not on file            Family History   No family history on file.        Allergies:  Patient has no known allergies.     Review of Systems:     Constitutional:          Negative for fever, chills, weight loss,   HENT:                                     Negative for hearing loss or tinnitus    Eyes:                           Negative for blurred vision, double vision, or loss of vision  Respiratory:               Negative for cough, hemoptysis, or wheezing    Cardiac:                      Negative for chest pain or palpitations or  "orthopnea  Vascular:                    Negative for claudication or rest pain   Gastrointestinal:       Negative for nausea, vomiting, or abdominal pain                                      Negative for hematochezia or melena   Genitourinary:           Negative for dysuria, frequency, or hematuria   Musculoskeletal:       Negative for myalgias, back pain, or joint pain  Skin:                           Negative for itching or rash  Neurological:             Negative for dizziness, headaches, or tremors                                      Negative for speech disturbance                                      Negative for extremity weakness or paresthesias  Endo/Heme:               Positive for easy bruising or bleeding  Psychiatric:                Negative for depression, suicidal ideas, or hallucinations     Physical Exam:  BP (!) 146/82 (BP Location: Left arm, Patient Position: Sitting, BP Cuff Size: Adult)   Pulse 74   Temp 36.6 °C (97.9 °F) (Temporal)   Ht 1.778 m (5' 10\")   Wt 103 kg (228 lb)   SpO2 98%      Constitutional:          Alert, oriented, no acute distress  HEENT:                       Normocephalic and atraumatic, EOMI  Neck:                          Supple, no JVD  Cardiovascular:         Regular rate and rhythm  Pulmonary:                Good air entry bilaterally  Abdominal:                Soft, non-tender, non-distended                                      Aortic impulse not widened  Musculoskeletal:       No edema, no tenderness  Neurological:             CN II-XII grossly intact, no focal deficits  Skin:                           Skin is warm and dry. No rash noted.  Psychiatric:                Normal mood and affect.  Left upper extremity: Palpable brachial and radial pulses with multiphasic flow.  Neri's test is negative.  Left forearm cephalic vein appears to have good caliber for use in fistula creation.           Radiology:  Fistula creation duplex done at outside facility showed left " cephalic vein to have good caliber for use in fistula creation.     Assessment:  -End-stage renal disease.  -Hypertension.  -Dyslipidemia.     Plan:  I had a long discussion with patient.  Study results were reviewed with him.  I discussed with him the option of undergoing a left radiocephalic fistula creation.  Risks and benefits were discussed.  Risks include, but not limited to, bleeding, infection, nerve injury, arterial steal syndrome which if severe enough would require ligation of the fistula, and perioperative anesthetic complications.  I also told him that it would take at least 2 months for the fistula to mature following the creation and that there is a chance further intervention may be needed to get the fistula to mature.  Patient indicated understanding and would like to proceed with left radiocephalic fistula creation.  All questions were answered.     I asked patient not to have blood procedures/IV/blood draw done in the left arm as of today.  He indicated understanding.

## 2024-04-15 NOTE — ANESTHESIA TIME REPORT
Anesthesia Start and Stop Event Times       Date Time Event    4/15/2024 0720 Ready for Procedure     0747 Anesthesia Start     0838 Anesthesia Stop          Responsible Staff  04/15/24      Name Role Begin End    Julius Mills M.D. Anesth 0747 0838          Overtime Reason:  no overtime (within assigned shift)    Comments:

## 2025-02-14 ENCOUNTER — TELEPHONE (OUTPATIENT)
Facility: MEDICAL CENTER | Age: 65
End: 2025-02-14
Payer: MEDICARE

## 2025-02-14 NOTE — TELEPHONE ENCOUNTER
"[facility]  Kidney Transplant Program- Referral Review    Patient Information  Patient Name:Donte Horta   YOB: 1960   MRN: [id number]   Preferred Language: Yes,  English  Address: [address] [address]    Emergency Contact Name: Leydi Winters   Phone Number: [phone]   Relationship to Patient: Significant other [13]   Patient Email (optional): [email or web address]   [place] Citizenship: Yes  BMI: Estimated body mass index is 29.39 kg/m² as calculated from the following:    Height as of 4/15/24: 1.778 m (5' 10\").    Weight as of 4/15/24: 92.9 kg (204 lb 12.9 oz).     Referral Information  Dialysis: Yes  Unit Name: [facility]  Dialysis Modality (if applicable): HD  Dialysis Schedule: M/W/F      Did patient provide social security number?  Yes  Is an  needed: No   Ambulatory assistance needed: No   What hospital or medical center is most care received? [facility]  Have you had any recent hospitalizations? No    Hospital Name:   When:    Specialty Providers  Do you have a PCP? Yes   Name: Anmol Mccray   PCP Phone:  Do you have a cardiologist or heart doctor? No    Name:  Do you have a urologist? No    Name:   Do you have any other specialty doctors (e.g.,Cardiology, Pulmonary, GI)?No    Names(s):  Have you had a colonoscopy? No    When:   Where:  Have you had a Mammogram? No    When:   Where:    Provider information will be added to care team and most recent notes and testing will be requested.     Transplant Information:  Previously received a transplant? No   Organ: N/A  Where:  When:  Currently listed for a transplant? No   If yes, list the transplant center(s):  Currently being evaluated for a transplant but not yet listed? No   If yes, list the transplant center(s):  Potential living donors? Yes  Name(s):  Relationship(s): daughter     Insurance Information  Primary Insurance: Medicare  Secondary Insurance (if applicable): Medicaid, [place]  Insurance Cards (front and back) Uploaded: Yes    Documentation Checklist  The following documents are required and will be requested if not already provided:  Demographics Sheet  Most Recent H&P (History and Physical)  Most Recent Nephrology Notes  Current Medication List  Most Recent Labs  Most Recent Hospital Discharge Summary (if available)  Vaccine Records  CMS 2728 Form (required for ESRD patients)      Records will be requested and sent to RN coordinator for further review and plan. Patient encouraged to contact us with any questions or medical updates in the interm.     At this time, does [facility] have verbal consent from you, Donte Horta, to request records from external facilities for your consult/evaluation? No  States he believes he was previously misdiagnosed   If patient stated no, we advised that the patient is required to obtain their records and bring them in with them to their first appointment.       Electronically Signed by   Tali Shaw  2/14/2025 8:25 AM

## 2025-03-23 NOTE — CARE PLAN
The patient is Stable - Low risk of patient condition declining or worsening    Shift Goals  Clinical Goals: safety  Patient Goals: rest and comfort  Family Goals: Not present    Progress made toward(s) clinical / shift goals:  patient a&O X4, amb. 2 L NC needed overnight. Left wrist Is swollen, red, and warm to touch. Notified on call. US ordered. VSS    Patient is not progressing towards the following goals:       0 (no pain/absence of nonverbal indicators of pain)

## 2025-06-04 ENCOUNTER — HOSPITAL ENCOUNTER (EMERGENCY)
Facility: MEDICAL CENTER | Age: 65
End: 2025-06-04
Attending: EMERGENCY MEDICINE
Payer: MEDICARE

## 2025-06-04 VITALS
SYSTOLIC BLOOD PRESSURE: 196 MMHG | OXYGEN SATURATION: 93 % | DIASTOLIC BLOOD PRESSURE: 105 MMHG | BODY MASS INDEX: 27.9 KG/M2 | TEMPERATURE: 98 F | WEIGHT: 194.89 LBS | RESPIRATION RATE: 16 BRPM | HEIGHT: 70 IN | HEART RATE: 77 BPM

## 2025-06-04 DIAGNOSIS — N18.6 ESRD (END STAGE RENAL DISEASE) (HCC): Primary | ICD-10-CM

## 2025-06-04 LAB
ANION GAP SERPL CALC-SCNC: 15 MMOL/L (ref 7–16)
BUN SERPL-MCNC: 52 MG/DL (ref 8–22)
CALCIUM SERPL-MCNC: 9.2 MG/DL (ref 8.5–10.5)
CHLORIDE SERPL-SCNC: 103 MMOL/L (ref 96–112)
CO2 SERPL-SCNC: 20 MMOL/L (ref 20–33)
CREAT SERPL-MCNC: 9.66 MG/DL (ref 0.5–1.4)
GFR SERPLBLD CREATININE-BSD FMLA CKD-EPI: 6 ML/MIN/1.73 M 2
GLUCOSE SERPL-MCNC: 120 MG/DL (ref 65–99)
POTASSIUM SERPL-SCNC: 4.2 MMOL/L (ref 3.6–5.5)
SODIUM SERPL-SCNC: 138 MMOL/L (ref 135–145)

## 2025-06-04 PROCEDURE — 80048 BASIC METABOLIC PNL TOTAL CA: CPT

## 2025-06-04 PROCEDURE — 36415 COLL VENOUS BLD VENIPUNCTURE: CPT

## 2025-06-04 PROCEDURE — 99283 EMERGENCY DEPT VISIT LOW MDM: CPT

## 2025-06-04 ASSESSMENT — FIBROSIS 4 INDEX: FIB4 SCORE: 0.83

## 2025-06-04 ASSESSMENT — COGNITIVE AND FUNCTIONAL STATUS - GENERAL
DAILY ACTIVITIY SCORE: 24
SUGGESTED CMS G CODE MODIFIER DAILY ACTIVITY: CH
MOBILITY SCORE: 24
SUGGESTED CMS G CODE MODIFIER MOBILITY: CH

## 2025-06-04 NOTE — ED PROVIDER NOTES
"ED Provider Note    ED PHYSICIAN NOTE    CHIEF COMPLAINT  Chief Complaint   Patient presents with    Other     Pt presents for dialysis. MWF schedule       EXTERNAL RECORDS REVIEWED  Inpatient Notes from fistula creation in 2020 for    HPI/ROS  LIMITATION TO HISTORY   Select: : None  OUTSIDE HISTORIAN(S):  none    Donte Horta is a 64 y.o. male who presents after missing his dialysis today.  Patient states he went to his dialysis center at [facility], he states he was there on time he got prepped however they were taking too long and did not get his dialysis started at his scheduled time so he left.  He reports he typically gets dialysis Monday Wednesday Friday has not missed any other sessions.  He reports no symptoms, no shortness of breath, no abnormal swelling, no nausea or vomiting.  No weakness, lightheadedness or any other symptoms    PAST MEDICAL HISTORY  Past Medical History[1]    SOCIAL HISTORY  Social History[2]    CURRENT MEDICATIONS  Home Medications       Reviewed by Ginny Price R.N. (Registered Nurse) on 06/04/25 at 1053  Med List Status: Partial     Medication Last Dose Status   atorvastatin (LIPITOR) 40 MG Tab  Active   carvedilol (COREG) 25 MG Tab  Active   furosemide (LASIX) 80 MG Tab  Active   losartan (COZAAR) 100 MG Tab  Active   NON SPECIFIED  Active   VITAMIN D PO  Active                    ALLERGIES  Allergies[3]    PHYSICAL EXAM  VITAL SIGNS: BP (!) 195/93   Pulse 80   Temp 36.2 °C (97.1 °F) (Temporal)   Resp 16   Ht 1.778 m (5' 10\")   Wt 88.4 kg (194 lb 14.2 oz)   SpO2 91%   BMI 27.96 kg/m²    Constitutional: Awake and alert   HENT: Normal inspection, no signs of trauma  Eyes: Normal inspection, Pupils equal, non-icteric  Neck: Grossly normal range of motion. No stridor  Cardiovascular: Regular rate and rhythm, no murmurs.    Thorax & Lungs: No respiratory distress, No wheezing, No rales, No rhonchi, No chest tenderness.   Abdomen:  soft, non-distended, nontender, no " mass  Skin: No obvious rash. Warm. Dry.   Back: No tenderness, No CVA tenderness.   Extremities: Fistula is noted to the left upper extremity no cyanosis, no edema  Neurologic: AO3, Grossly normal  Psychiatric: Normal affect for situation        DIAGNOSTIC STUDIES / PROCEDURES  LABS/EKG  Labs Reviewed   BASIC METABOLIC PANEL - Abnormal; Notable for the following components:       Result Value    Glucose 120 (*)     Bun 52 (*)     Creatinine 9.66 (*)     All other components within normal limits   ESTIMATED GFR - Abnormal; Notable for the following components:    GFR (CKD-EPI) 6 (*)     All other components within normal limits                 COURSE & MEDICAL DECISION MAKING    INITIAL ASSESSMENT, COURSE AND PLAN  Care Narrative: 1:08 PM  Patient presents with after missing his dialysis session.  He does not appear volume overloaded he is not hypoxemic, and is in fact asymptomatic.  Will check a metabolic panel to see if he needs emergent dialysis    2:21 PM  Patient is reevaluated, updated on results he is comfortable with the discharge plan       PROBLEM LIST    # End-stage renal disease.  Patient presenting after missing his dialysis today.  At this point no findings that would indicate need for emergent dialysis.  He is not hypoxemic or with any findings of volume overload.  Not significantly uremic, no hyper kalemia.  He does have dialysis scheduled for Friday which he states he will attend      DISPOSITION AND DISCUSSIONS      Barriers to care at this time, including but not limited to: none.     Prescription drugs considered and/or prescribed:   Considered opiate prescription, but nonnarcotic analgesic is most appropriate  Prescribed   New Prescriptions    No medications on file       The patient will return for new or worsening symptoms and is stable at the time of discharge.    The patient is referred to a primary physician for blood pressure management, diabetic screening, and for all other preventative  health concerns.      DISPOSITION:  Patient will be discharged home in stable condition.    FOLLOW UP:  Anmol Mccray M.D.  [address] [place] [address] [phone]            OUTPATIENT MEDICATIONS:  New Prescriptions    No medications on file         FINAL DIAGNOSIS  1. ESRD (end stage renal disease) (HCC)               This dictation was created using voice recognition software. The accuracy of the dictation is limited to the abilities of the software. I expect there may be some errors of grammar and possibly content. The nursing notes were reviewed and certain aspects of this information were incorporated into this note.    Electronically signed by: Vito Persaud M.D., 6/4/2025              [1]   Past Medical History:  Diagnosis Date    Acute renal failure superimposed on chronic kidney disease (HCC) 11/21/2023    on hemodialysis    Anemia     Anemia due to chronic kidney disease 11/21/2023    Anticoagulation monitoring, special range     Blood clotting disorder ([facility])     heart    Cardiomyopathy ([facility])     Chronic systolic congestive heart failure (HCC) 09/17/2020    Diabetes ([facility])     no longer on medications per MD, 6/2021, Controlled by diet    Dialysis patient ([facility]) 12 2023    Former smoker 11/21/2023    High cholesterol 04/03/2024    on medication    Hyperkalemia 11/21/2023    Hypertension 04/03/2024    on medication    LV (left ventricular) mural thrombus     on warfarin, no longer on coumadin months ago    Metabolic acidosis 11/21/2023    Noncompliance 11/21/2023    States on 11/21/23 that he had stopped seeing cardiology for his CHF and nephrology for his CKD because he did not feel that they were worth the money. Does also have some economic barriers to keeping routine primary care appointments.    [2]   Social History  Tobacco Use    Smoking status: Former     Current packs/day: 0.00     Average packs/day: 1 pack/day for 20.0 years (20.0 ttl pk-yrs)     Types: Cigarettes     Start  date:      Quit date:      Years since quittin.4     Passive exposure: Never    Smokeless tobacco: Never   Vaping Use    Vaping status: Never Used   Substance Use Topics    Alcohol use: Not Currently     Comment: Quit drinking 2023    Drug use: Not Currently     Types: Marijuana, Inhaled, Oral     Comment: occasional   [3] No Known Allergies

## 2025-06-04 NOTE — ED TRIAGE NOTES
Donte Horta  64 y.o. male  Chief Complaint   Patient presents with    Other     Pt presents for dialysis. MWF schedule     Pt presents for above complaint. Pt was at [facility] getting prepped for dialysis and got frustrated and left.     Patient educated on triage process and encouraged to alert staff of any changes in condition.

## 2025-06-04 NOTE — ED NOTES
Patient provided discharge instructions, verbalizes understanding and denies any further questions. Patient instructed to follow up with dialysis and return if condition worsens. No distress noted. Patient ambulated to the front lobby with all belongings.

## (undated) DEVICE — SLEEVE VASO DVT COMPRESSION CALF MED - (10PR/CA)

## (undated) DEVICE — GOWN WARMING STANDARD FLEX - (30/CA)

## (undated) DEVICE — COVER LIGHT HANDLE FLEXIBLE - SOFT (2EA/PK 80PK/CA)

## (undated) DEVICE — CHLORAPREP 26 ML APPLICATOR - ORANGE TINT(25/CA)

## (undated) DEVICE — CANISTER SUCTION RIGID RED 1500CC (40EA/CA)

## (undated) DEVICE — SPONGE GAUZE NON-STERILE 2X2 - 4-PLY (200/PK 40PK/CA)

## (undated) DEVICE — SYRINGE 30 ML LL (56/BX)

## (undated) DEVICE — MEDICINE CUP STERILE 2 OZ - (100/CA)

## (undated) DEVICE — TUBING CLEARLINK DUO-VENT - C-FLO (48EA/CA)

## (undated) DEVICE — SUTURE 7-0 PROLENE BV-1 D/A 24 (36PK/BX)"

## (undated) DEVICE — INSTRUMENT JAWCOVER SUTURE - BOOTS (5PK/BX)

## (undated) DEVICE — MASK AIRWAY FLEXIBLE SINGLE-USE SIZE 4 ADULTS (10EA/BX)

## (undated) DEVICE — SUTURE 3-0 VICRYL PLUS SH - 27 INCH (36/BX)

## (undated) DEVICE — SET LEADWIRE 5 LEAD BEDSIDE DISPOSABLE ECG (1SET OF 5/EA)

## (undated) DEVICE — CANISTER SUCTION 3000ML MECHANICAL FILTER AUTO SHUTOFF MEDI-VAC NONSTERILE LF DISP (40EA/CA)

## (undated) DEVICE — GLOVE BIOGEL PI INDICATOR SZ 7.0 SURGICAL PF LF - (50/BX 4BX/CA)

## (undated) DEVICE — SUTURE GENERAL

## (undated) DEVICE — SUTURE 4-0 MONOCRYL PLUS PS-2 - 27 INCH (36/BX)

## (undated) DEVICE — WATER IRRIGATION STERILE 1000ML (12EA/CA)

## (undated) DEVICE — PACK AV FISTULA (2EA/CA)

## (undated) DEVICE — SENSOR OXIMETER ADULT SPO2 RD SET (20EA/BX)

## (undated) DEVICE — GLOVE BIOGEL SZ 7 SURGICAL PF LTX - (50PR/BX 4BX/CA)

## (undated) DEVICE — GLOVE SZ 7 BIOGEL PI MICRO - PF LF (50PR/BX 4BX/CA)

## (undated) DEVICE — SUCTION INSTRUMENT YANKAUER BULBOUS TIP W/O VENT (50EA/CA)

## (undated) DEVICE — GOWN SURGEONS X-LARGE - DISP. (30/CA)

## (undated) DEVICE — GLOVE BIOGEL INDICATOR SZ 7.5 SURGICAL PF LTX - (50PR/BX 4BX/CA)

## (undated) DEVICE — GLOVE BIOGEL PI INDICATOR SZ 7.5 SURGICAL PF LF -(50/BX 4BX/CA)

## (undated) DEVICE — ELECTRODE DUAL RETURN W/ CORD - (50/PK)

## (undated) DEVICE — SODIUM CHL. INJ. 0.9% 500ML (24EA/CA 50CA/PF)

## (undated) DEVICE — GLOVE BIOGEL SZ 6.5 SURGICAL PF LTX (50PR/BX 4BX/CA)

## (undated) DEVICE — SUTURE 4-0 SILK 12 X 18 INCH - (36/BX)

## (undated) DEVICE — LACTATED RINGERS INJ 1000 ML - (14EA/CA 60CA/PF)

## (undated) DEVICE — CANNULA O2 COMFORT SOFT EAR ADULT 7 FT TUBING (50/CA)

## (undated) DEVICE — TOWEL STOP TIMEOUT SAFETY FLAG (40EA/CA)

## (undated) DEVICE — KIT  I.V. START (100EA/CA)

## (undated) DEVICE — MASK OXYGEN VNYL ADLT MED CONC WITH 7 FOOT TUBING - (50EA/CA)

## (undated) DEVICE — DRESSING TRANSPARENT FILM TEGADERM 2.375 X 2.75" (100EA/BX)"

## (undated) DEVICE — SODIUM CHL IRRIGATION 0.9% 1000ML (12EA/CA)

## (undated) DEVICE — DRAPE LARGE 3 QUARTER - (20/CA)

## (undated) DEVICE — TUBE CONNECTING SUCTION - CLEAR PLASTIC STERILE 72 IN (50EA/CA)